# Patient Record
Sex: FEMALE | Race: WHITE | NOT HISPANIC OR LATINO | Employment: OTHER | URBAN - METROPOLITAN AREA
[De-identification: names, ages, dates, MRNs, and addresses within clinical notes are randomized per-mention and may not be internally consistent; named-entity substitution may affect disease eponyms.]

---

## 2017-01-13 DIAGNOSIS — S91.319A: Primary | ICD-10-CM

## 2017-01-13 DIAGNOSIS — S99.922A FOOT INJURY, LEFT, INITIAL ENCOUNTER: Primary | ICD-10-CM

## 2017-01-13 RX ORDER — OXYCODONE HYDROCHLORIDE 5 MG/1
5 TABLET ORAL EVERY 4 HOURS PRN
Qty: 25 TABLET | Refills: 0 | Status: SHIPPED | OUTPATIENT
Start: 2017-01-13 | End: 2018-02-22

## 2017-01-13 RX ORDER — OXYCODONE HYDROCHLORIDE 5 MG/1
5 TABLET ORAL EVERY 4 HOURS PRN
Qty: 25 TABLET | Refills: 0 | Status: SHIPPED | OUTPATIENT
Start: 2017-01-13 | End: 2017-01-13

## 2017-01-13 RX ORDER — OXYCODONE HYDROCHLORIDE 5 MG/1
5-10 TABLET ORAL EVERY 4 HOURS PRN
Qty: 18 TABLET | Refills: 0 | Status: SHIPPED | OUTPATIENT
Start: 2017-01-13 | End: 2018-02-22

## 2018-02-22 ENCOUNTER — OFFICE VISIT (OUTPATIENT)
Dept: FAMILY MEDICINE | Facility: CLINIC | Age: 58
End: 2018-02-22
Payer: COMMERCIAL

## 2018-02-22 VITALS
SYSTOLIC BLOOD PRESSURE: 129 MMHG | HEART RATE: 83 BPM | HEIGHT: 66 IN | DIASTOLIC BLOOD PRESSURE: 77 MMHG | WEIGHT: 116.6 LBS | OXYGEN SATURATION: 98 % | BODY MASS INDEX: 18.74 KG/M2

## 2018-02-22 DIAGNOSIS — Z01.818 PREOP GENERAL PHYSICAL EXAM: Primary | ICD-10-CM

## 2018-02-22 DIAGNOSIS — M17.11 PRIMARY OSTEOARTHRITIS OF RIGHT KNEE: ICD-10-CM

## 2018-02-22 DIAGNOSIS — Z91.030 ALLERGY TO BEE STING: ICD-10-CM

## 2018-02-22 LAB
HGB BLD-MCNC: 13.4 G/DL (ref 11.7–15.7)
MRSA DNA SPEC QL NAA+PROBE: NEGATIVE
PLATELET # BLD AUTO: 331 10E9/L (ref 150–450)
SPECIMEN SOURCE: NORMAL

## 2018-02-22 PROCEDURE — 99214 OFFICE O/P EST MOD 30 MIN: CPT | Performed by: INTERNAL MEDICINE

## 2018-02-22 PROCEDURE — 36415 COLL VENOUS BLD VENIPUNCTURE: CPT | Performed by: INTERNAL MEDICINE

## 2018-02-22 PROCEDURE — 87641 MR-STAPH DNA AMP PROBE: CPT | Performed by: INTERNAL MEDICINE

## 2018-02-22 PROCEDURE — 85018 HEMOGLOBIN: CPT | Performed by: INTERNAL MEDICINE

## 2018-02-22 PROCEDURE — 85049 AUTOMATED PLATELET COUNT: CPT | Performed by: INTERNAL MEDICINE

## 2018-02-22 PROCEDURE — 87640 STAPH A DNA AMP PROBE: CPT | Mod: 59 | Performed by: INTERNAL MEDICINE

## 2018-02-22 RX ORDER — EPINEPHRINE 0.3 MG/.3ML
0.3 INJECTION SUBCUTANEOUS
Status: CANCELLED | OUTPATIENT
Start: 2018-02-22

## 2018-02-22 RX ORDER — EPINEPHRINE 0.3 MG/.3ML
0.3 INJECTION SUBCUTANEOUS
Qty: 1.2 ML | Refills: 11 | Status: SHIPPED | OUTPATIENT
Start: 2018-02-22

## 2018-02-22 NOTE — MR AVS SNAPSHOT
After Visit Summary   2/22/2018    Kimi Ulloa    MRN: [id number]           Patient Information     Date Of Birth          1960        Visit Information        Provider Department      2/22/2018 2:30 PM Patricia Vargas MD [facility]        Today's Diagnoses     Preop general physical exam    -  1    Allergy to bee sting        Primary osteoarthritis of right knee          Care Instructions      Before Your Surgery      Call your surgeon if there is any change in your health. This includes signs of a cold or flu (such as a sore throat, runny nose, cough, rash or fever).    Do not smoke, drink alcohol or take over the counter medicine (unless your surgeon or primary care doctor tells you to) for the 24 hours before and after surgery.    If you take prescribed drugs: Follow your doctor s orders about which medicines to take and which to stop until after surgery.    Eating and drinking prior to surgery: follow the instructions from your surgeon    Take a shower or bath the night before surgery. Use the soap your surgeon gave you to gently clean your skin. If you do not have soap from your surgeon, use your regular soap. Do not shave or scrub the surgery site.  Wear clean pajamas and have clean sheets on your bed.           Follow-ups after your visit        Your next 10 appointments already scheduled     Feb 26, 2018   Procedure with Paresh Oliveira MD   [facility] (--)    [address]  [facility] [id number] [phone]              Who to contact     If you have questions or need follow up information about today's clinic visit or your schedule please contact [facility] directly at [phone].  Normal or non-critical lab and imaging results will be communicated to you by MyChart, letter or phone within 4 business days after the clinic has received the results. If you do not hear from us within 7 days, please contact the clinic  "through [employer] or phone. If you have a critical or abnormal lab result, we will notify you by phone as soon as possible.  Submit refill requests through [employer] or call your pharmacy and they will forward the refill request to us. Please allow 3 business days for your refill to be completed.          Additional Information About Your Visit        [employer] Information     [employer] lets you send messages to your doctor, view your test results, renew your prescriptions, schedule appointments and more. To sign up, go to [email or web address] . Click on \"Log in\" on the left side of the screen, which will take you to the Welcome page. Then click on \"Sign up Now\" on the right side of the page.     You will be asked to enter the access code listed below, as well as some personal information. Please follow the directions to create your username and password.     Your access code is: [email or web address]  Expires: 2018 10:02 AM     Your access code will  in 90 days. If you need help or a new code, please call your [place] clinic or [phone].        Care EveryWhere ID     This is your Care EveryWhere ID. This could be used by other organizations to access your [place] medical records  [id number]        Your Vitals Were     Pulse Height Last Period Pulse Oximetry Breastfeeding? BMI (Body Mass Index)    83 5' 6\" (1.676 m) (Approximate) 98% No 18.82 kg/m2       Blood Pressure from Last 3 Encounters:   18 129/77   01/14/15 126/85   14 122/80    Weight from Last 3 Encounters:   18 116 lb 9.6 oz (52.9 kg)   01/12/15 118 lb (53.5 kg)   14 119 lb 0.8 oz (54 kg)              We Performed the Following     Hemoglobin     Methicillin Resist/Sens S. aureus PCR     Platelet count          Where to get your medicines      These medications were sent to [id number] - [facility] [address]  [facility]  [address] [facility] [address]     Phone:  [phone]     EPINEPHrine 0.3 " MG/0.3ML injection 2-pack          Primary Care Provider Office Phone # Fax #    Patricia Camilo Vargas [phone] [address] [address]        Equal Access to Services     ADRIELDAISY NAHUN : Hadii aad ku hadjoseo Soedwinali, waaxda luqadaha, qaybta kaalmada adeegyada, waxseymour cesarin hayeliun garrett riveralisbethlauren lara . So [facility] [phone].    ATENCIÓN: Si habla español, tiene a obrien disposición servicios gratuitos de asistencia lingüística. Llame al [phone].    We comply with applicable federal civil rights laws and [place] laws. We do not discriminate on the basis of race, color, national origin, age, disability, sex, sexual orientation, or gender identity.            Thank you!     Thank you for choosing [facility]  for your care. Our goal is always to provide you with excellent care. Hearing back from our patients is one way we can continue to improve our services. Please take a few minutes to complete the written survey that you may receive in the mail after your visit with us. Thank you!             Your Updated Medication List - Protect others around you: Learn how to safely use, store and throw away your medicines at www.disposemymeds.org.          This list is accurate as of 2/22/18 11:59 PM.  Always use your most recent med list.                   Brand Name Dispense Instructions for use Diagnosis    acetaminophen 325 MG tablet    TYLENOL    100 tablet    Take 2 tablets (650 mg) by mouth every 6 hours as needed for mild pain or fever    Septic joint of right knee joint (H)       amoxicillin-clavulanate 875-125 MG per tablet    AUGMENTIN    28 tablet    Take 1 tablet by mouth 2 times daily    Septic joint of right knee joint (H)       EPINEPHrine 0.3 MG/0.3ML injection 2-pack    EPIPEN/ADRENACLICK/or ANY BX GENERIC EQUIV    1.2 mL    Inject 0.3 mLs (0.3 mg) into the muscle once as needed for anaphylaxis    Allergy to bee sting       IBUPROFEN PO      Take 600-800 mg  by mouth every 8 hours as needed for moderate pain        multivitamin, therapeutic Tabs tablet      Take 1 tablet by mouth daily

## 2018-02-22 NOTE — LETTER
[place] [address] [facility]  [address]  [place]  [address]  Tel: [phone]    February 23, 2018    Kimi Ulloa  [address] [place] [id number]        Dear Ms. Ulloa,    The results of your recent labs are ok.    If you have any further questions or problems, please contact our office.      Sincerely,    Camilo Vargas MD/ifrah    Results for orders placed or performed in visit on 02/22/18   Hemoglobin   Result Value Ref Range    Hemoglobin 13.4 11.7 - 15.7 g/dL   Platelet count   Result Value Ref Range    Platelet Count 331 150 - 450 10e9/L   Methicillin Resist/Sens S. aureus PCR   Result Value Ref Range    Specimen Description Nares     Methicillin Resist/Sens S. aureus PCR Negative NEG^Negative           Enclosure: Lab Results

## 2018-02-22 NOTE — PROGRESS NOTES
"[facility]  [address] [facility] [address]  [phone]  Dept: [phone]    PRE-OP EVALUATION:  Today's date: 2018    Kimi Ulloa (: 1960) presents for pre-operative evaluation assessment as requested by Dr. Oliveira.  She requires evaluation and anesthesia risk assessment prior to undergoing Right knee replacement surgery/procedure for treatment of chronic right knee DJD.    Proposed Surgery/ Procedure: Right knee replacement surgery  Date of Surgery/ Procedure: 2018  Time of Surgery/ Procedure: 9:00AM   Hospital/Surgical Facility: [facility]   Fax number for surgical facility:   Primary Physician: Patricia Vargas  Type of Anesthesia Anticipated: to be determined    Patient has a Health Care Directive or Living Will:  NO    1. NO - Do you have a history of heart attack, stroke, stent, bypass or surgery on an artery in the head, neck, heart or legs?  2. NO - Do you ever have any pain or discomfort in your chest?  3. NO - Do you have a history of  Heart Failure?  4. NO - Are you troubled by shortness of breath when: walking on the level, up a slight hill or at night?  5. NO - Do you currently have a cold, bronchitis or other respiratory infection?  6. NO - Do you have a cough, shortness of breath or wheezing?  7. NO - Do you sometimes get pains in the calves of your legs when you walk?  10. NO - Have you ever had problems with anemia or been told to take iron pills?  12. NO - Have you ever had a blood transfusion?  13. NO - Have you or any of your relatives ever had problems with anesthesia?  14. NO - Do you have sleep apnea, excessive snoring or daytime drowsiness?  15. NO - Do you have any prosthetic heart valves?  16. YES - DO YOU HAVE PROSTHETIC JOINTS? S/p Left Knee replacement  17. NO - Is there any chance that you may be pregnant?  8. NO - Do you or anyone in your family have previous history of blood clots?\",9. NO - Do you or does anyone in your " family have a serious bleeding problem such as prolonged bleeding following surgeries or cuts?  11 NO - Have you had any abnormal blood loss such as black, tarry or bloody stools, or abnormal vaginal bleeding?      HPI:     HPI related to upcoming procedure: Patient Kimi Ulloa is a very pleasant 57 year old female with a history of chronic right knee DJD, allergies to bee stings who presents to internal medicine clinic today for a pre op cardiac evaluation for upcoming Right knee replacement surgery for treatment of chronic right knee DJD.  Patient denies any chest pain, headaches, fever or chills. Patient denies any known history of CAD, CVA or Type 2 Diabetes. Patient is not on any daily aspirin or any other blood thinner medications. Patient denies any known history of allergies to anesthesia agents.      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 Provider to review and confirm.)       Aftercare following joint replacement 05/10/2010     Priority: Medium     Edema 05/10/2010     Priority: Medium      No past medical history on file.  Past Surgical History:   Procedure Laterality Date     ARTHROPLASTY KNEE Left 2010     ARTHROPLASTY REVISION KNEE  7/19/2014    Procedure: ARTHROPLASTY REVISION KNEE;  Surgeon: Paresh Oliveira MD;  Location:  OR     IRRIGATION AND DEBRIDEMENT KNEE, PLACE ANTIBIOTIC CEMENT BEADS / SPACE  7/19/2014    Procedure: COMBINED IRRIGATION AND DEBRIDEMENT KNEE, PLACE ANTIBIOTIC CEMENT BEADS / SPACER;  Surgeon: Paresh Oliveira MD;  Location:  OR     Current Outpatient  "Prescriptions   Medication Sig Dispense Refill     oxyCODONE (ROXICODONE) 5 MG IR tablet Take 1 tablet (5 mg) by mouth every 4 hours as needed for pain maximum 8 tablet(s) per day 25 tablet 0     oxyCODONE (ROXICODONE) 5 MG IR tablet Take 1-2 tablets (5-10 mg) by mouth every 4 hours as needed for pain Maximum 8 tablet(s) per day 18 tablet 0     HYDROcodone-acetaminophen (NORCO) 5-325 MG per tablet Take 1-2 tablets by mouth every 6 hours as needed for moderate to severe pain 50 tablet 0     acetaminophen (TYLENOL) 325 MG tablet Take 2 tablets (650 mg) by mouth every 6 hours as needed for mild pain or fever 100 tablet      amoxicillin-clavulanate (AUGMENTIN) 875-125 MG per tablet Take 1 tablet by mouth 2 times daily 28 tablet 0     multivitamin, therapeutic (THERA-VIT) TABS Take 1 tablet by mouth daily       IBUPROFEN PO Take 600-800 mg by mouth every 8 hours as needed for moderate pain        EPINEPHrine (EPIPEN) 0.3 MG/0.3ML injection Inject 0.3 mLs (0.3 mg) into the muscle once as needed for anaphylaxis 4 each 0     OTC products: None, except as noted above    Allergies   Allergen Reactions     Bees Anaphylaxis     Morphine Nausea and Vomiting     Vancomycin Rash      Latex Allergy: NO    Social History   Substance Use Topics     Smoking status: Never Smoker     Smokeless tobacco: Not on file     Alcohol use Yes     History   Drug Use No       REVIEW OF SYSTEMS:   Constitutional, neuro, ENT, endocrine, pulmonary, cardiac, gastrointestinal, genitourinary, musculoskeletal, integument and psychiatric systems are negative, except as otherwise noted.    EXAM:   /77 (BP Location: Right arm, Patient Position: Chair, Cuff Size: Adult Regular)  Pulse 83  Ht 5' 6\" (1.676 m)  Wt 116 lb 9.6 oz (52.9 kg)  LMP  (Approximate)  SpO2 98%  Breastfeeding? No  BMI 18.82 kg/m2    GENERAL APPEARANCE: healthy, alert and no distress     EYES: EOMI, PERRL     HENT: ear canals and TM's normal and nose and mouth without ulcers " or lesions     NECK: no adenopathy, no asymmetry, masses, or scars and thyroid normal to palpation     RESP: lungs clear to auscultation - no rales, rhonchi or wheezes     CV: regular rates and rhythm, normal S1 S2, no S3 or S4 and no murmur, click or rub     ABDOMEN:  soft, nontender, no HSM or masses and bowel sounds normal     MS: extremities normal- no gross deformities noted, no evidence of inflammation in joints, FROM in all extremities.     SKIN: no suspicious lesions or rashes     NEURO: Normal strength and tone, sensory exam grossly normal, mentation intact and speech normal     PSYCH: mentation appears normal. and affect normal/bright     LYMPHATICS: No cervical adenopathy    DIAGNOSTICS:       Recent Labs   Lab Test  01/12/15   1109  09/02/14   1500   08/11/14   1400  08/07/14   1030   07/19/14   0611   04/15/10   0629   HGB  13.9  12.6   < >  11.7   --    < >  12.5   < >  10.0*   PLT  288  356   < >  349   --    < >  265   < >   --    INR   --    --    --   1.43*  1.85*   < >   --    < >  1.15*   NA   --    --    --    --    --    --   141   --   135   POTASSIUM   --    --    --    --    --    --   3.6   --   4.2   CR  0.65  0.58   < >  0.53   --    < >  0.57   --    --     < > = values in this interval not displayed.      Results for orders placed or performed in visit on 02/22/18   Hemoglobin   Result Value Ref Range    Hemoglobin 13.4 11.7 - 15.7 g/dL   Platelet count   Result Value Ref Range    Platelet Count 331 150 - 450 10e9/L   Methicillin Resist/Sens S. aureus PCR   Result Value Ref Range    Specimen Description Nares     Methicillin Resist/Sens S. aureus PCR Negative NEG^Negative       IMPRESSION:   Reason for surgery/procedure: chronic right knee DJD  Diagnosis/reason for consult: pre op cardiac evaluation for Right knee replacement surgery for treatment of chronic right knee DJD.    The proposed surgical procedure is considered INTERMEDIATE risk.    REVISED CARDIAC RISK INDEX  The patient has  the following serious cardiovascular risks for perioperative complications such as (MI, PE, VFib and 3  AV Block):  No serious cardiac risks  INTERPRETATION: 0 risks: Class I (very low risk - 0.4% complication rate)    The patient has the following additional risks for perioperative complications:  No identified additional risks    RECOMMENDATIONS:       --Patient is to take all scheduled medications on the day of surgery.    APPROVAL GIVEN to proceed with proposed procedure, without further diagnostic evaluation       Signed Electronically by: Patricia Vargas MD    Copy of this evaluation report is provided to requesting physician.    [facility] Preop Guidelines

## 2018-02-22 NOTE — NURSING NOTE
"Chief Complaint   Patient presents with     Pre-Op Exam       Initial /77 (BP Location: Right arm, Patient Position: Chair, Cuff Size: Adult Regular)  Pulse 83  Ht 5' 6\" (1.676 m)  Wt 116 lb 9.6 oz (52.9 kg)  LMP  (Approximate)  SpO2 98%  Breastfeeding? No  BMI 18.82 kg/m2 Estimated body mass index is 18.82 kg/(m^2) as calculated from the following:    Height as of this encounter: 5' 6\" (1.676 m).    Weight as of this encounter: 116 lb 9.6 oz (52.9 kg).  Medication Reconciliation: complete   Zulma Desir MA     "

## 2018-02-23 NOTE — H&P (VIEW-ONLY)
"[facility]  [address] [facility] [address]  [phone]  Dept: [phone]    PRE-OP EVALUATION:  Today's date: 2018    Kimi Ulloa (: 1960) presents for pre-operative evaluation assessment as requested by Dr. Oliveira.  She requires evaluation and anesthesia risk assessment prior to undergoing Right knee replacement surgery/procedure for treatment of chronic right knee DJD.    Proposed Surgery/ Procedure: Right knee replacement surgery  Date of Surgery/ Procedure: 2018  Time of Surgery/ Procedure: 9:00AM   Hospital/Surgical Facility: [facility]   Fax number for surgical facility:   Primary Physician: Patricia Vargas  Type of Anesthesia Anticipated: to be determined    Patient has a Health Care Directive or Living Will:  NO    1. NO - Do you have a history of heart attack, stroke, stent, bypass or surgery on an artery in the head, neck, heart or legs?  2. NO - Do you ever have any pain or discomfort in your chest?  3. NO - Do you have a history of  Heart Failure?  4. NO - Are you troubled by shortness of breath when: walking on the level, up a slight hill or at night?  5. NO - Do you currently have a cold, bronchitis or other respiratory infection?  6. NO - Do you have a cough, shortness of breath or wheezing?  7. NO - Do you sometimes get pains in the calves of your legs when you walk?  10. NO - Have you ever had problems with anemia or been told to take iron pills?  12. NO - Have you ever had a blood transfusion?  13. NO - Have you or any of your relatives ever had problems with anesthesia?  14. NO - Do you have sleep apnea, excessive snoring or daytime drowsiness?  15. NO - Do you have any prosthetic heart valves?  16. YES - DO YOU HAVE PROSTHETIC JOINTS? S/p Left Knee replacement  17. NO - Is there any chance that you may be pregnant?  8. NO - Do you or anyone in your family have previous history of blood clots?\",9. NO - Do you or does anyone in your " family have a serious bleeding problem such as prolonged bleeding following surgeries or cuts?  11 NO - Have you had any abnormal blood loss such as black, tarry or bloody stools, or abnormal vaginal bleeding?      HPI:     HPI related to upcoming procedure: Patient Kimi Ulloa is a very pleasant 57 year old female with a history of chronic right knee DJD, allergies to bee stings who presents to internal medicine clinic today for a pre op cardiac evaluation for upcoming Right knee replacement surgery for treatment of chronic right knee DJD.  Patient denies any chest pain, headaches, fever or chills. Patient denies any known history of CAD, CVA or Type 2 Diabetes. Patient is not on any daily aspirin or any other blood thinner medications. Patient denies any known history of allergies to anesthesia agents.      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 Provider to review and confirm.)       Aftercare following joint replacement 05/10/2010     Priority: Medium     Edema 05/10/2010     Priority: Medium      No past medical history on file.  Past Surgical History:   Procedure Laterality Date     ARTHROPLASTY KNEE Left 2010     ARTHROPLASTY REVISION KNEE  7/19/2014    Procedure: ARTHROPLASTY REVISION KNEE;  Surgeon: Paresh Oliveira MD;  Location:  OR     IRRIGATION AND DEBRIDEMENT KNEE, PLACE ANTIBIOTIC CEMENT BEADS / SPACE  7/19/2014    Procedure: COMBINED IRRIGATION AND DEBRIDEMENT KNEE, PLACE ANTIBIOTIC CEMENT BEADS / SPACER;  Surgeon: Paresh Oliveira MD;  Location:  OR     Current Outpatient  "Prescriptions   Medication Sig Dispense Refill     oxyCODONE (ROXICODONE) 5 MG IR tablet Take 1 tablet (5 mg) by mouth every 4 hours as needed for pain maximum 8 tablet(s) per day 25 tablet 0     oxyCODONE (ROXICODONE) 5 MG IR tablet Take 1-2 tablets (5-10 mg) by mouth every 4 hours as needed for pain Maximum 8 tablet(s) per day 18 tablet 0     HYDROcodone-acetaminophen (NORCO) 5-325 MG per tablet Take 1-2 tablets by mouth every 6 hours as needed for moderate to severe pain 50 tablet 0     acetaminophen (TYLENOL) 325 MG tablet Take 2 tablets (650 mg) by mouth every 6 hours as needed for mild pain or fever 100 tablet      amoxicillin-clavulanate (AUGMENTIN) 875-125 MG per tablet Take 1 tablet by mouth 2 times daily 28 tablet 0     multivitamin, therapeutic (THERA-VIT) TABS Take 1 tablet by mouth daily       IBUPROFEN PO Take 600-800 mg by mouth every 8 hours as needed for moderate pain        EPINEPHrine (EPIPEN) 0.3 MG/0.3ML injection Inject 0.3 mLs (0.3 mg) into the muscle once as needed for anaphylaxis 4 each 0     OTC products: None, except as noted above    Allergies   Allergen Reactions     Bees Anaphylaxis     Morphine Nausea and Vomiting     Vancomycin Rash      Latex Allergy: NO    Social History   Substance Use Topics     Smoking status: Never Smoker     Smokeless tobacco: Not on file     Alcohol use Yes     History   Drug Use No       REVIEW OF SYSTEMS:   Constitutional, neuro, ENT, endocrine, pulmonary, cardiac, gastrointestinal, genitourinary, musculoskeletal, integument and psychiatric systems are negative, except as otherwise noted.    EXAM:   /77 (BP Location: Right arm, Patient Position: Chair, Cuff Size: Adult Regular)  Pulse 83  Ht 5' 6\" (1.676 m)  Wt 116 lb 9.6 oz (52.9 kg)  LMP  (Approximate)  SpO2 98%  Breastfeeding? No  BMI 18.82 kg/m2    GENERAL APPEARANCE: healthy, alert and no distress     EYES: EOMI, PERRL     HENT: ear canals and TM's normal and nose and mouth without ulcers " or lesions     NECK: no adenopathy, no asymmetry, masses, or scars and thyroid normal to palpation     RESP: lungs clear to auscultation - no rales, rhonchi or wheezes     CV: regular rates and rhythm, normal S1 S2, no S3 or S4 and no murmur, click or rub     ABDOMEN:  soft, nontender, no HSM or masses and bowel sounds normal     MS: extremities normal- no gross deformities noted, no evidence of inflammation in joints, FROM in all extremities.     SKIN: no suspicious lesions or rashes     NEURO: Normal strength and tone, sensory exam grossly normal, mentation intact and speech normal     PSYCH: mentation appears normal. and affect normal/bright     LYMPHATICS: No cervical adenopathy    DIAGNOSTICS:       Recent Labs   Lab Test  01/12/15   1109  09/02/14   1500   08/11/14   1400  08/07/14   1030   07/19/14   0611   04/15/10   0629   HGB  13.9  12.6   < >  11.7   --    < >  12.5   < >  10.0*   PLT  288  356   < >  349   --    < >  265   < >   --    INR   --    --    --   1.43*  1.85*   < >   --    < >  1.15*   NA   --    --    --    --    --    --   141   --   135   POTASSIUM   --    --    --    --    --    --   3.6   --   4.2   CR  0.65  0.58   < >  0.53   --    < >  0.57   --    --     < > = values in this interval not displayed.      Results for orders placed or performed in visit on 02/22/18   Hemoglobin   Result Value Ref Range    Hemoglobin 13.4 11.7 - 15.7 g/dL   Platelet count   Result Value Ref Range    Platelet Count 331 150 - 450 10e9/L   Methicillin Resist/Sens S. aureus PCR   Result Value Ref Range    Specimen Description Nares     Methicillin Resist/Sens S. aureus PCR Negative NEG^Negative       IMPRESSION:   Reason for surgery/procedure: chronic right knee DJD  Diagnosis/reason for consult: pre op cardiac evaluation for Right knee replacement surgery for treatment of chronic right knee DJD.    The proposed surgical procedure is considered INTERMEDIATE risk.    REVISED CARDIAC RISK INDEX  The patient has  the following serious cardiovascular risks for perioperative complications such as (MI, PE, VFib and 3  AV Block):  No serious cardiac risks  INTERPRETATION: 0 risks: Class I (very low risk - 0.4% complication rate)    The patient has the following additional risks for perioperative complications:  No identified additional risks    RECOMMENDATIONS:       --Patient is to take all scheduled medications on the day of surgery.    APPROVAL GIVEN to proceed with proposed procedure, without further diagnostic evaluation       Signed Electronically by: Patricia Vargas MD    Copy of this evaluation report is provided to requesting physician.    [place] Preop Guidelines

## 2018-02-24 ENCOUNTER — HEALTH MAINTENANCE LETTER (OUTPATIENT)
Age: 58
End: 2018-02-24

## 2018-02-26 ENCOUNTER — HOSPITAL ENCOUNTER (INPATIENT)
Facility: CLINIC | Age: 58
LOS: 3 days | Discharge: HOME HEALTH | DRG: 470 | End: 2018-03-01
Attending: ORTHOPAEDIC SURGERY | Admitting: ORTHOPAEDIC SURGERY
Payer: COMMERCIAL

## 2018-02-26 ENCOUNTER — ANESTHESIA (OUTPATIENT)
Dept: SURGERY | Facility: CLINIC | Age: 58
DRG: 470 | End: 2018-02-26
Payer: COMMERCIAL

## 2018-02-26 ENCOUNTER — APPOINTMENT (OUTPATIENT)
Dept: GENERAL RADIOLOGY | Facility: CLINIC | Age: 58
DRG: 470 | End: 2018-02-26
Attending: ORTHOPAEDIC SURGERY
Payer: COMMERCIAL

## 2018-02-26 ENCOUNTER — ANESTHESIA EVENT (OUTPATIENT)
Dept: SURGERY | Facility: CLINIC | Age: 58
DRG: 470 | End: 2018-02-26
Payer: COMMERCIAL

## 2018-02-26 ENCOUNTER — APPOINTMENT (OUTPATIENT)
Dept: PHYSICAL THERAPY | Facility: CLINIC | Age: 58
DRG: 470 | End: 2018-02-26
Attending: ORTHOPAEDIC SURGERY
Payer: COMMERCIAL

## 2018-02-26 DIAGNOSIS — Z96.651 TOTAL KNEE REPLACEMENT STATUS, RIGHT: Primary | ICD-10-CM

## 2018-02-26 DIAGNOSIS — Z96.652 STATUS POST TOTAL LEFT KNEE REPLACEMENT: ICD-10-CM

## 2018-02-26 LAB
CREAT SERPL-MCNC: 0.58 MG/DL (ref 0.52–1.04)
GFR SERPL CREATININE-BSD FRML MDRD: >90 ML/MIN/1.7M2
PLATELET # BLD AUTO: 247 10E9/L (ref 150–450)

## 2018-02-26 PROCEDURE — 97110 THERAPEUTIC EXERCISES: CPT | Mod: GP | Performed by: PHYSICAL THERAPIST

## 2018-02-26 PROCEDURE — 97530 THERAPEUTIC ACTIVITIES: CPT | Mod: GP | Performed by: PHYSICAL THERAPIST

## 2018-02-26 PROCEDURE — 97161 PT EVAL LOW COMPLEX 20 MIN: CPT | Mod: GP | Performed by: PHYSICAL THERAPIST

## 2018-02-26 PROCEDURE — 37000008 ZZH ANESTHESIA TECHNICAL FEE, 1ST 30 MIN: Performed by: ORTHOPAEDIC SURGERY

## 2018-02-26 PROCEDURE — 25000128 H RX IP 250 OP 636: Performed by: ORTHOPAEDIC SURGERY

## 2018-02-26 PROCEDURE — 36415 COLL VENOUS BLD VENIPUNCTURE: CPT | Performed by: ORTHOPAEDIC SURGERY

## 2018-02-26 PROCEDURE — 25000125 ZZHC RX 250: Performed by: NURSE ANESTHETIST, CERTIFIED REGISTERED

## 2018-02-26 PROCEDURE — 25000128 H RX IP 250 OP 636: Performed by: NURSE ANESTHETIST, CERTIFIED REGISTERED

## 2018-02-26 PROCEDURE — 40000170 ZZH STATISTIC PRE-PROCEDURE ASSESSMENT II: Performed by: ORTHOPAEDIC SURGERY

## 2018-02-26 PROCEDURE — 36000063 ZZH SURGERY LEVEL 4 EA 15 ADDTL MIN: Performed by: ORTHOPAEDIC SURGERY

## 2018-02-26 PROCEDURE — 37000009 ZZH ANESTHESIA TECHNICAL FEE, EACH ADDTL 15 MIN: Performed by: ORTHOPAEDIC SURGERY

## 2018-02-26 PROCEDURE — 71000012 ZZH RECOVERY PHASE 1 LEVEL 1 FIRST HR: Performed by: ORTHOPAEDIC SURGERY

## 2018-02-26 PROCEDURE — 27210794 ZZH OR GENERAL SUPPLY STERILE: Performed by: ORTHOPAEDIC SURGERY

## 2018-02-26 PROCEDURE — 25000128 H RX IP 250 OP 636: Performed by: ANESTHESIOLOGY

## 2018-02-26 PROCEDURE — 85049 AUTOMATED PLATELET COUNT: CPT | Performed by: ORTHOPAEDIC SURGERY

## 2018-02-26 PROCEDURE — 25800025 ZZH RX 258: Performed by: ORTHOPAEDIC SURGERY

## 2018-02-26 PROCEDURE — 36000093 ZZH SURGERY LEVEL 4 1ST 30 MIN: Performed by: ORTHOPAEDIC SURGERY

## 2018-02-26 PROCEDURE — C1776 JOINT DEVICE (IMPLANTABLE): HCPCS | Performed by: ORTHOPAEDIC SURGERY

## 2018-02-26 PROCEDURE — 82565 ASSAY OF CREATININE: CPT | Performed by: ORTHOPAEDIC SURGERY

## 2018-02-26 PROCEDURE — 27210995 ZZH RX 272: Performed by: ORTHOPAEDIC SURGERY

## 2018-02-26 PROCEDURE — 27110028 ZZH OR GENERAL SUPPLY NON-STERILE: Performed by: ORTHOPAEDIC SURGERY

## 2018-02-26 PROCEDURE — 0SRC0J9 REPLACEMENT OF RIGHT KNEE JOINT WITH SYNTHETIC SUBSTITUTE, CEMENTED, OPEN APPROACH: ICD-10-PCS | Performed by: ORTHOPAEDIC SURGERY

## 2018-02-26 PROCEDURE — 25000128 H RX IP 250 OP 636

## 2018-02-26 PROCEDURE — 27810169 ZZH OR IMPLANT GENERAL: Performed by: ORTHOPAEDIC SURGERY

## 2018-02-26 PROCEDURE — 25000125 ZZHC RX 250: Performed by: ORTHOPAEDIC SURGERY

## 2018-02-26 PROCEDURE — 40000986 XR KNEE PORT RT 1/2 VW: Mod: RT

## 2018-02-26 PROCEDURE — 12000007 ZZH R&B INTERMEDIATE

## 2018-02-26 PROCEDURE — 40000193 ZZH STATISTIC PT WARD VISIT: Performed by: PHYSICAL THERAPIST

## 2018-02-26 PROCEDURE — 97116 GAIT TRAINING THERAPY: CPT | Mod: GP | Performed by: PHYSICAL THERAPIST

## 2018-02-26 DEVICE — IMP TIB BASE JJ ATTUNE RP SZ5 CEM 150610005: Type: IMPLANTABLE DEVICE | Site: KNEE | Status: FUNCTIONAL

## 2018-02-26 DEVICE — IMPLANTABLE DEVICE: Type: IMPLANTABLE DEVICE | Site: KNEE | Status: FUNCTIONAL

## 2018-02-26 DEVICE — IMP COMP FEM JJ ATTUNE POST STAB RT NRW CEM SZ5 150410225: Type: IMPLANTABLE DEVICE | Site: KNEE | Status: FUNCTIONAL

## 2018-02-26 DEVICE — IMP BONE CEMENT SIMPLEX W/GENTAMICIN 6195-1-001: Type: IMPLANTABLE DEVICE | Site: KNEE | Status: FUNCTIONAL

## 2018-02-26 RX ORDER — HYDROCODONE BITARTRATE AND ACETAMINOPHEN 5; 325 MG/1; MG/1
1-2 TABLET ORAL EVERY 4 HOURS PRN
Status: DISCONTINUED | OUTPATIENT
Start: 2018-02-26 | End: 2018-02-26

## 2018-02-26 RX ORDER — NALOXONE HYDROCHLORIDE 0.4 MG/ML
.1-.4 INJECTION, SOLUTION INTRAMUSCULAR; INTRAVENOUS; SUBCUTANEOUS
Status: DISCONTINUED | OUTPATIENT
Start: 2018-02-26 | End: 2018-03-01 | Stop reason: HOSPADM

## 2018-02-26 RX ORDER — AMOXICILLIN 250 MG
1 CAPSULE ORAL 2 TIMES DAILY PRN
Status: DISCONTINUED | OUTPATIENT
Start: 2018-02-26 | End: 2018-02-26

## 2018-02-26 RX ORDER — SODIUM CHLORIDE 9 MG/ML
INJECTION, SOLUTION INTRAVENOUS CONTINUOUS
Status: DISCONTINUED | OUTPATIENT
Start: 2018-02-26 | End: 2018-02-27

## 2018-02-26 RX ORDER — OXYCODONE HYDROCHLORIDE 5 MG/1
5-10 TABLET ORAL
Status: DISCONTINUED | OUTPATIENT
Start: 2018-02-26 | End: 2018-03-01

## 2018-02-26 RX ORDER — ONDANSETRON 4 MG/1
4 TABLET, ORALLY DISINTEGRATING ORAL EVERY 30 MIN PRN
Status: DISCONTINUED | OUTPATIENT
Start: 2018-02-26 | End: 2018-02-26 | Stop reason: HOSPADM

## 2018-02-26 RX ORDER — BUPIVACAINE HYDROCHLORIDE 2.5 MG/ML
INJECTION, SOLUTION EPIDURAL; INFILTRATION; INTRACAUDAL PRN
Status: DISCONTINUED | OUTPATIENT
Start: 2018-02-26 | End: 2018-02-26 | Stop reason: HOSPADM

## 2018-02-26 RX ORDER — HYDROMORPHONE HYDROCHLORIDE 1 MG/ML
.3-.5 INJECTION, SOLUTION INTRAMUSCULAR; INTRAVENOUS; SUBCUTANEOUS
Status: DISCONTINUED | OUTPATIENT
Start: 2018-02-26 | End: 2018-03-01 | Stop reason: HOSPADM

## 2018-02-26 RX ORDER — ONDANSETRON 2 MG/ML
4 INJECTION INTRAMUSCULAR; INTRAVENOUS EVERY 30 MIN PRN
Status: DISCONTINUED | OUTPATIENT
Start: 2018-02-26 | End: 2018-02-26 | Stop reason: HOSPADM

## 2018-02-26 RX ORDER — PROPOFOL 10 MG/ML
INJECTION, EMULSION INTRAVENOUS CONTINUOUS PRN
Status: DISCONTINUED | OUTPATIENT
Start: 2018-02-26 | End: 2018-02-26

## 2018-02-26 RX ORDER — KETOROLAC TROMETHAMINE 15 MG/ML
15 INJECTION, SOLUTION INTRAMUSCULAR; INTRAVENOUS EVERY 6 HOURS PRN
Status: DISCONTINUED | OUTPATIENT
Start: 2018-02-26 | End: 2018-02-27

## 2018-02-26 RX ORDER — BISACODYL 10 MG
10 SUPPOSITORY, RECTAL RECTAL DAILY PRN
Status: DISCONTINUED | OUTPATIENT
Start: 2018-02-26 | End: 2018-02-26

## 2018-02-26 RX ORDER — CEFAZOLIN SODIUM 2 G/100ML
2 INJECTION, SOLUTION INTRAVENOUS
Status: COMPLETED | OUTPATIENT
Start: 2018-02-26 | End: 2018-02-26

## 2018-02-26 RX ORDER — LIDOCAINE 40 MG/G
CREAM TOPICAL
Status: DISCONTINUED | OUTPATIENT
Start: 2018-02-26 | End: 2018-03-01 | Stop reason: HOSPADM

## 2018-02-26 RX ORDER — FENTANYL CITRATE 50 UG/ML
INJECTION, SOLUTION INTRAMUSCULAR; INTRAVENOUS PRN
Status: DISCONTINUED | OUTPATIENT
Start: 2018-02-26 | End: 2018-02-26

## 2018-02-26 RX ORDER — FENTANYL CITRATE 0.05 MG/ML
25-50 INJECTION, SOLUTION INTRAMUSCULAR; INTRAVENOUS EVERY 5 MIN PRN
Status: DISCONTINUED | OUTPATIENT
Start: 2018-02-26 | End: 2018-02-26 | Stop reason: HOSPADM

## 2018-02-26 RX ORDER — CYCLOBENZAPRINE HCL 10 MG
10 TABLET ORAL 3 TIMES DAILY PRN
Status: DISCONTINUED | OUTPATIENT
Start: 2018-02-26 | End: 2018-03-01 | Stop reason: HOSPADM

## 2018-02-26 RX ORDER — ACETAMINOPHEN 325 MG/1
650 TABLET ORAL EVERY 4 HOURS PRN
Status: DISCONTINUED | OUTPATIENT
Start: 2018-03-01 | End: 2018-03-01 | Stop reason: HOSPADM

## 2018-02-26 RX ORDER — NALOXONE HYDROCHLORIDE 0.4 MG/ML
.1-.4 INJECTION, SOLUTION INTRAMUSCULAR; INTRAVENOUS; SUBCUTANEOUS
Status: ACTIVE | OUTPATIENT
Start: 2018-02-26 | End: 2018-02-27

## 2018-02-26 RX ORDER — AMOXICILLIN 250 MG
2 CAPSULE ORAL 2 TIMES DAILY PRN
Status: DISCONTINUED | OUTPATIENT
Start: 2018-02-26 | End: 2018-02-26

## 2018-02-26 RX ORDER — ONDANSETRON 2 MG/ML
INJECTION INTRAMUSCULAR; INTRAVENOUS PRN
Status: DISCONTINUED | OUTPATIENT
Start: 2018-02-26 | End: 2018-02-26

## 2018-02-26 RX ORDER — HYDROMORPHONE HYDROCHLORIDE 1 MG/ML
INJECTION, SOLUTION INTRAMUSCULAR; INTRAVENOUS; SUBCUTANEOUS
Status: COMPLETED
Start: 2018-02-26 | End: 2018-02-26

## 2018-02-26 RX ORDER — AMOXICILLIN 250 MG
1 CAPSULE ORAL 2 TIMES DAILY PRN
Status: DISCONTINUED | OUTPATIENT
Start: 2018-02-26 | End: 2018-03-01 | Stop reason: HOSPADM

## 2018-02-26 RX ORDER — BUPIVACAINE HYDROCHLORIDE AND EPINEPHRINE 2.5; 5 MG/ML; UG/ML
INJECTION, SOLUTION EPIDURAL; INFILTRATION; INTRACAUDAL; PERINEURAL PRN
Status: DISCONTINUED | OUTPATIENT
Start: 2018-02-26 | End: 2018-02-26 | Stop reason: HOSPADM

## 2018-02-26 RX ORDER — SODIUM CHLORIDE, SODIUM LACTATE, POTASSIUM CHLORIDE, CALCIUM CHLORIDE 600; 310; 30; 20 MG/100ML; MG/100ML; MG/100ML; MG/100ML
INJECTION, SOLUTION INTRAVENOUS CONTINUOUS
Status: DISCONTINUED | OUTPATIENT
Start: 2018-02-26 | End: 2018-02-26 | Stop reason: HOSPADM

## 2018-02-26 RX ORDER — MAGNESIUM HYDROXIDE 1200 MG/15ML
LIQUID ORAL PRN
Status: DISCONTINUED | OUTPATIENT
Start: 2018-02-26 | End: 2018-02-26 | Stop reason: HOSPADM

## 2018-02-26 RX ORDER — CEFAZOLIN SODIUM 1 G
1 VIAL (EA) INJECTION EVERY 8 HOURS
Status: COMPLETED | OUTPATIENT
Start: 2018-02-26 | End: 2018-02-27

## 2018-02-26 RX ORDER — BUPIVACAINE HYDROCHLORIDE 7.5 MG/ML
INJECTION, SOLUTION INTRASPINAL PRN
Status: DISCONTINUED | OUTPATIENT
Start: 2018-02-26 | End: 2018-02-26

## 2018-02-26 RX ORDER — ONDANSETRON 4 MG/1
4 TABLET, ORALLY DISINTEGRATING ORAL EVERY 6 HOURS PRN
Status: DISCONTINUED | OUTPATIENT
Start: 2018-02-26 | End: 2018-03-01 | Stop reason: HOSPADM

## 2018-02-26 RX ORDER — BISACODYL 10 MG
10 SUPPOSITORY, RECTAL RECTAL DAILY PRN
Status: DISCONTINUED | OUTPATIENT
Start: 2018-02-26 | End: 2018-03-01 | Stop reason: HOSPADM

## 2018-02-26 RX ORDER — SODIUM CHLORIDE, SODIUM LACTATE, POTASSIUM CHLORIDE, CALCIUM CHLORIDE 600; 310; 30; 20 MG/100ML; MG/100ML; MG/100ML; MG/100ML
INJECTION, SOLUTION INTRAVENOUS CONTINUOUS
Status: DISCONTINUED | OUTPATIENT
Start: 2018-02-26 | End: 2018-02-26

## 2018-02-26 RX ORDER — LABETALOL HYDROCHLORIDE 5 MG/ML
10 INJECTION, SOLUTION INTRAVENOUS
Status: DISCONTINUED | OUTPATIENT
Start: 2018-02-26 | End: 2018-02-26 | Stop reason: HOSPADM

## 2018-02-26 RX ORDER — CEFAZOLIN SODIUM 1 G
1 VIAL (EA) INJECTION SEE ADMIN INSTRUCTIONS
Status: DISCONTINUED | OUTPATIENT
Start: 2018-02-26 | End: 2018-02-26

## 2018-02-26 RX ORDER — AMOXICILLIN 250 MG
2 CAPSULE ORAL 2 TIMES DAILY PRN
Status: DISCONTINUED | OUTPATIENT
Start: 2018-02-26 | End: 2018-03-01 | Stop reason: HOSPADM

## 2018-02-26 RX ORDER — ONDANSETRON 2 MG/ML
4 INJECTION INTRAMUSCULAR; INTRAVENOUS EVERY 6 HOURS PRN
Status: DISCONTINUED | OUTPATIENT
Start: 2018-02-26 | End: 2018-03-01 | Stop reason: HOSPADM

## 2018-02-26 RX ORDER — EPHEDRINE SULFATE 50 MG/ML
INJECTION, SOLUTION INTRAMUSCULAR; INTRAVENOUS; SUBCUTANEOUS PRN
Status: DISCONTINUED | OUTPATIENT
Start: 2018-02-26 | End: 2018-02-26

## 2018-02-26 RX ORDER — HYDROMORPHONE HYDROCHLORIDE 1 MG/ML
.3-.5 INJECTION, SOLUTION INTRAMUSCULAR; INTRAVENOUS; SUBCUTANEOUS EVERY 5 MIN PRN
Status: DISCONTINUED | OUTPATIENT
Start: 2018-02-26 | End: 2018-02-26 | Stop reason: HOSPADM

## 2018-02-26 RX ADMIN — CEFAZOLIN SODIUM 1 G: 10 INJECTION, POWDER, FOR SOLUTION INTRAVENOUS at 17:06

## 2018-02-26 RX ADMIN — PHENYLEPHRINE HYDROCHLORIDE 100 MCG: 10 INJECTION INTRAVENOUS at 09:58

## 2018-02-26 RX ADMIN — PHENYLEPHRINE HYDROCHLORIDE 50 MCG: 10 INJECTION INTRAVENOUS at 09:40

## 2018-02-26 RX ADMIN — PHENYLEPHRINE HYDROCHLORIDE 50 MCG: 10 INJECTION INTRAVENOUS at 09:29

## 2018-02-26 RX ADMIN — PHENYLEPHRINE HYDROCHLORIDE 100 MCG: 10 INJECTION INTRAVENOUS at 10:40

## 2018-02-26 RX ADMIN — KETOROLAC TROMETHAMINE 15 MG: 15 INJECTION, SOLUTION INTRAMUSCULAR; INTRAVENOUS at 13:36

## 2018-02-26 RX ADMIN — Medication 0.5 MG: at 13:12

## 2018-02-26 RX ADMIN — KETOROLAC TROMETHAMINE 15 MG: 15 INJECTION, SOLUTION INTRAMUSCULAR; INTRAVENOUS at 19:48

## 2018-02-26 RX ADMIN — Medication 5 MG: at 10:07

## 2018-02-26 RX ADMIN — FENTANYL CITRATE 25 MCG: 50 INJECTION, SOLUTION INTRAMUSCULAR; INTRAVENOUS at 09:25

## 2018-02-26 RX ADMIN — SODIUM CHLORIDE: 9 INJECTION, SOLUTION INTRAVENOUS at 13:14

## 2018-02-26 RX ADMIN — SODIUM CHLORIDE, POTASSIUM CHLORIDE, SODIUM LACTATE AND CALCIUM CHLORIDE: 600; 310; 30; 20 INJECTION, SOLUTION INTRAVENOUS at 07:59

## 2018-02-26 RX ADMIN — PHENYLEPHRINE HYDROCHLORIDE 100 MCG: 10 INJECTION INTRAVENOUS at 09:53

## 2018-02-26 RX ADMIN — ONDANSETRON 4 MG: 2 INJECTION INTRAMUSCULAR; INTRAVENOUS at 18:29

## 2018-02-26 RX ADMIN — SODIUM CHLORIDE, POTASSIUM CHLORIDE, SODIUM LACTATE AND CALCIUM CHLORIDE: 600; 310; 30; 20 INJECTION, SOLUTION INTRAVENOUS at 09:31

## 2018-02-26 RX ADMIN — PHENYLEPHRINE HYDROCHLORIDE 100 MCG: 10 INJECTION INTRAVENOUS at 10:04

## 2018-02-26 RX ADMIN — BUPIVACAINE HYDROCHLORIDE IN DEXTROSE 10.5 MG: 7.5 INJECTION, SOLUTION SUBARACHNOID at 09:04

## 2018-02-26 RX ADMIN — MIDAZOLAM HYDROCHLORIDE 2 MG: 1 INJECTION, SOLUTION INTRAMUSCULAR; INTRAVENOUS at 08:00

## 2018-02-26 RX ADMIN — PHENYLEPHRINE HYDROCHLORIDE 100 MCG: 10 INJECTION INTRAVENOUS at 10:07

## 2018-02-26 RX ADMIN — PHENYLEPHRINE HYDROCHLORIDE 100 MCG: 10 INJECTION INTRAVENOUS at 10:28

## 2018-02-26 RX ADMIN — ROPIVACAINE HYDROCHLORIDE 20 ML GIVEN: 5 INJECTION, SOLUTION EPIDURAL; INFILTRATION; PERINEURAL at 08:00

## 2018-02-26 RX ADMIN — PHENYLEPHRINE HYDROCHLORIDE 50 MCG: 10 INJECTION INTRAVENOUS at 09:45

## 2018-02-26 RX ADMIN — PHENYLEPHRINE HYDROCHLORIDE 100 MCG: 10 INJECTION INTRAVENOUS at 10:19

## 2018-02-26 RX ADMIN — Medication 0.5 MG: at 17:14

## 2018-02-26 RX ADMIN — Medication 0.5 MG: at 19:03

## 2018-02-26 RX ADMIN — Medication 5 MG: at 10:28

## 2018-02-26 RX ADMIN — Medication 10 MG: at 10:13

## 2018-02-26 RX ADMIN — FENTANYL CITRATE 25 MCG: 50 INJECTION, SOLUTION INTRAMUSCULAR; INTRAVENOUS at 10:36

## 2018-02-26 RX ADMIN — Medication 5 MG: at 10:46

## 2018-02-26 RX ADMIN — PHENYLEPHRINE HYDROCHLORIDE 50 MCG: 10 INJECTION INTRAVENOUS at 09:34

## 2018-02-26 RX ADMIN — PHENYLEPHRINE HYDROCHLORIDE 50 MCG: 10 INJECTION INTRAVENOUS at 09:49

## 2018-02-26 RX ADMIN — CEFAZOLIN SODIUM 2 G: 2 INJECTION, SOLUTION INTRAVENOUS at 09:06

## 2018-02-26 RX ADMIN — MIDAZOLAM 2 MG: 1 INJECTION INTRAMUSCULAR; INTRAVENOUS at 08:57

## 2018-02-26 RX ADMIN — SODIUM CHLORIDE: 9 INJECTION, SOLUTION INTRAVENOUS at 21:42

## 2018-02-26 RX ADMIN — PROPOFOL 100 MCG/KG/MIN: 10 INJECTION, EMULSION INTRAVENOUS at 09:05

## 2018-02-26 RX ADMIN — ONDANSETRON 4 MG: 2 INJECTION INTRAMUSCULAR; INTRAVENOUS at 10:16

## 2018-02-26 RX ADMIN — Medication 0.5 MG: at 15:12

## 2018-02-26 RX ADMIN — Medication 0.5 MG: at 21:40

## 2018-02-26 RX ADMIN — HYDROMORPHONE HYDROCHLORIDE 0.5 MG: 1 INJECTION, SOLUTION INTRAMUSCULAR; INTRAVENOUS; SUBCUTANEOUS at 13:12

## 2018-02-26 NOTE — BRIEF OP NOTE
[facility] Brief Operative Note    Pre-operative diagnosis: END STAGE OSTEOARTHRITIS RIGHT KNEE   Post-operative diagnosis  same   Procedure: Procedure(s):  RIGHT TOTAL KNEE ARTHROPLASTY   - Wound Class: I-Clean   Surgeon(s): Surgeon(s) and Role:     * Paresh Oliveira MD - Primary    asst   Pascual Mendoza   Estimated blood loss: 20   Specimens: none   Findings: Chronic  Acl,   Stage   IV   Post  Med  And  Lat  Joints  And  1  Cm  Depression  of

## 2018-02-26 NOTE — IP AVS SNAPSHOT
` ` Patient Information     Patient Name Sex     Kimi Ulloa ([id number]) Female 1960       Room Bed    [facility]      Patient Demographics     Address Phone E-mail Address    [address]  [place] [address] [phone] (Home)  [phone] (Work)  [phone] (Mobile) [email or web address]      Patient Ethnicity & Race     Ethnic Group Patient Race    American White      PCP and Center    Primary Care Provider  Phone Center     Patricia Vargas [phone] [address], [place] [address]        Emergency Contact(s)     Name Relation Home Work Mobile    Ramírez Ulloa Spouse none None [phone]    Dwightreshma Rashid Father [phone] none None      Documents on File        Status Date Received Description       Documents for the Patient    ETHAN Face Sheet Received () 05/10/10     Waiver - Payment  05/10/10     Privacy Notice - [facility]  05/10/10     Insurance Card  () 05/10/10     Consent for EHR Access       External Medication Information Consent Accepted 18     Patient ID Received () 14     [facility] Specified Other       Consent for Services - Hospital/Clinic Received () 10/29/13     Insurance Card Received () 14     Consent for EHR Access Received 14     Privacy Notice - [facility] Received 14     Consent for Services - Hospital/Clinic Received () 14     Privacy Notice - [facility]  14 ACKNOWLEDGMENT OF RECEIPT OF NOTICE OF PRIVACY PRACTICE    Patient ID Received () 01/12/15     Consent for Services/Privacy Notice - Hospital/Clinic Received 18     Care Everywhere Prospective Auth Received 18     Insurance Card Received 18 PO    Patient ID Received 18 [place]     Consent for Services - Hospital/Clinic Received (Deleted) 10/29/13        Documents for the Encounter    CMS IM for Patient Signature       CE Point of Care Auth Received        Admission Information     Attending  Provider Admitting Provider Admission Type Admission Date/Time    Paresh Oliveira MD Teynor, Joseph T, MD Elective 02/26/18  0654    Discharge Date Hospital Service Auth/Cert Status Service Area     Surgery Incomplete [facility]    Unit Room/Bed Admission Status       [facility] ORTHO SPEC UNIT 5522/5522-01 Admission (Confirmed)       Admission     Complaint    END STAGE OSTEOARTHRITIS RIGHT KNEE, Total knee replacement status, right      Hospital Account     Name Acct ID Class Status Primary Coverage    Kimi Ulloa [id number] Inpatient Open [facility] ADMIN SERV OPEN ACCESS            Guarantor Account (for Hospital Account #[id number])     Name Relation to Pt Service Area Active? Acct Type    Kimi Ulloa  FCS Yes Personal/Family    Address Phone          [address] [place] [address] [phone](H)  [phone](O)              Coverage Information (for Hospital Account #[id number])     F/O Payor/Plan Precert #    [employer] ADMIN SERV OPEN ACCESS     Subscriber Subscriber #    Ramírez Ulloa [id number]    Address Phone    [address] [address]  [place] [address] [phone]

## 2018-02-26 NOTE — ANESTHESIA PROCEDURE NOTES
Peripheral nerve/Neuraxial procedure note : intrathecal  Pre-Procedure  Performed by YUE CHIU  Location: OR      Pre-Anesthestic Checklist: patient identified, IV checked, risks and benefits discussed, informed consent, monitors and equipment checked and pre-op evaluation    Timeout  Correct Patient: Yes   Correct Procedure: Yes   Correct Site: Yes   Correct Laterality: N/A   Correct Position: Yes   Site Marked: N/A   .   Procedure Documentation    .    Procedure:    Intrathecal.  Insertion Site:L4-5  (midline approach)      Patient Prep;povidone-iodine 7.5% surgical scrub.  .  Needle: Sagrario tip Spinal Needle (gauge): 20  # of attempts: 1 and # of redirects:  Introducer used Introducer: 20 G .       Assessment/Narrative  Paresthesias: No.  .  .  clear CSF fluid removed . Comments:  1% lidocaine for localization.  No complications.

## 2018-02-26 NOTE — IP AVS SNAPSHOT
"[place] [address] [facility] UNIT: [phone]                                              INTERAGENCY TRANSFER FORM - PHYSICIAN ORDERS   2018                    Hospital Admission Date: 2018  NOMI PA   : 1960  Sex: Female        Attending Provider: Paresh Oliveira MD     Allergies:  Bees, Morphine, Vancomycin    Infection:  None   Service:  SURGERY    Ht:  1.676 m (5' 6\")   Wt:  49 kg (108 lb 0.4 oz)   Admission Wt:  49 kg (108 lb 0.4 oz)    BMI:  17.44 kg/m 2   BSA:  1.51 m 2            Patient PCP Information     Provider PCP Type    Patricia Vargas MD General      ED Clinical Impression     Diagnosis Description Comment Added By Time Added    Total knee replacement status, right [Z96.651] Total knee replacement status, right [Z96.651]  Paresh Oliveira MD 2018 12:03 PM    Status post total left knee replacement [Z96.652] Status post total left knee replacement [Z96.652]  Bahman Mendoza 2018  9:31 AM      Hospital Problems as of 3/1/2018              Priority Class Noted POA    Total knee replacement status, right Medium  2018 Yes      Non-Hospital Problems as of 3/1/2018              Priority Class Noted    S/P TKR (total knee replacement) Medium  5/10/2010    Aftercare following joint replacement Medium  5/10/2010    Edema Medium  5/10/2010    Septic joint of left knee joint (H) Medium  2014    Infected prosthetic knee joint (H) Medium  2014    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, implant, and graft (H) Medium  2015      Code Status History     Date Active Date Inactive Code Status Order ID Comments User Context    2014 11:32 AM 2014  5:31 PM Full Code [id number]  Paresh Oliveira MD Inpatient    2014  7:32 PM 2014 11:32 AM Full Code [id number]  Kingsley Lloyd PA-C Inpatient         Medication Review      START taking        Dose / Directions Comments    aspirin 325 MG EC tablet     "    Dose:  325 mg   Take 1 tablet (325 mg) by mouth 2 times daily (before meals)   Quantity:  60 tablet   Refills:  0        cyclobenzaprine 10 MG tablet   Commonly known as:  FLEXERIL        Dose:  10 mg   Take 1 tablet (10 mg) by mouth 3 times daily as needed for muscle spasms   Quantity:  30 tablet   Refills:  0        HYDROmorphone 2 MG tablet   Commonly known as:  DILAUDID        Dose:  2-4 mg   Take 1-2 tablets (2-4 mg) by mouth every 3 hours as needed for moderate to severe pain   Quantity:  70 tablet   Refills:  0        ondansetron 4 MG ODT tab   Commonly known as:  ZOFRAN-ODT        Dose:  4 mg   Take 1 tablet (4 mg) by mouth every 6 hours as needed (Nausea and Vomiting)   Quantity:  10 tablet   Refills:  0        order for DME        Start taking on:  3/22/2018   Equipment being ordered: CPM: Advance as tolerated. Range 0-90 degree flexion Treatment Diagnosis  Total knee   Quantity:  1 Device   Refills:  0        senna-docusate 8.6-50 MG per tablet   Commonly known as:  SENOKOT-S;PERICOLACE        Dose:  1 tablet   Take 1 tablet by mouth 2 times daily as needed for constipation   Quantity:  40 tablet   Refills:  0          CONTINUE these medications which have NOT CHANGED        Dose / Directions Comments    AMOXICILLIN PO        Dose:  2000 mg   Take 2,000 mg by mouth daily as needed (patient takes 4 X 500 mg = 2,000 mg dose) 1 hour prior to dental appt.   Refills:  0        EPINEPHrine 0.3 MG/0.3ML injection 2-pack   Commonly known as:  EPIPEN/ADRENACLICK/or ANY BX GENERIC EQUIV   Used for:  Allergy to bee sting        Dose:  0.3 mg   Inject 0.3 mLs (0.3 mg) into the muscle once as needed for anaphylaxis   Quantity:  1.2 mL   Refills:  11        IBUPROFEN PO   Indication:  infected knee        Dose:  800 mg   Take 800 mg by mouth daily as needed for moderate pain   Refills:  0        KRILL OIL PO        Dose:  1 capsule   Take 1 capsule by mouth every morning   Refills:  0        multivitamin,  therapeutic Tabs tablet        Dose:  1 tablet   Take 1 tablet by mouth daily   Refills:  0        TYLENOL PO        Dose:  650 mg   Take 650 mg by mouth every 6 hours as needed for mild pain or fever   Refills:  0                Summary of Visit     Reason for your hospital stay       Osteoarthritis right Knee       Reason for your hospital stay       tka             After Care     Activity       Your activity upon discharge: activity as tolerated       Diet       Follow this diet upon discharge: Orders Placed This Encounter      Advance Diet as Tolerated: Regular Diet Adult             Follow-Up Appointment Instructions     Future Labs/Procedures    Follow-up and recommended labs and tests      Comments:    Follow up with Dr. Oliveira , at [facility]) , [id number] days  to evaluate after surgery.    Follow-up and recommended labs and tests      Comments:    Follow up with Paresh ellison, within 2 weeks.      Follow-Up Appointment Instructions     Follow-up and recommended labs and tests        Follow up with Dr. Oliveira , at [facility]) , [id number] days  to evaluate after surgery.       Follow-up and recommended labs and tests        Follow up with Paresh ellison, within 2 weeks.             Statement of Approval     Ordered          03/01/18 0723  I have reviewed and agree with all the recommendations and orders detailed in this document.  EFFECTIVE NOW     Approved and electronically signed by:  Paresh Oliveira MD

## 2018-02-26 NOTE — ANESTHESIA PREPROCEDURE EVALUATION
Anesthesia Evaluation     .             ROS/MED HX    ENT/Pulmonary:      (-) sleep apnea   Neurologic:       Cardiovascular:         METS/Exercise Tolerance:     Hematologic:         Musculoskeletal:         GI/Hepatic:        (-) GERD   Renal/Genitourinary:         Endo:         Psychiatric:         Infectious Disease:         Malignancy:         Other:                     Physical Exam  Normal systems: cardiovascular, pulmonary and dental    Airway   Mallampati: II  TM distance: >3 FB  Neck ROM: full    Dental     Cardiovascular   Rhythm and rate: regular      Pulmonary    breath sounds clear to auscultation                    Anesthesia Plan      History & Physical Review  History and physical reviewed and following examination; no interval change.    ASA Status:  1 .    NPO Status:  > 8 hours    Plan for Periph. Nerve Block for postop pain and Spinal   PONV prophylaxis:  Ondansetron (or other 5HT-3)       Postoperative Care  Postoperative pain management:  IV analgesics and Peripheral nerve block (Single Shot).      Consents  Anesthetic plan, risks, benefits and alternatives discussed with:  Patient..                          .

## 2018-02-26 NOTE — PLAN OF CARE
Problem: Patient Care Overview  Goal: Plan of Care/Patient Progress Review  PT: Order received; Initial evaluation completed and treatment initiated POD #0 s/p R TKA. Patient reports prior L TKA. Patient lives in a 3 story home with her spouse and normally independent and active without an assistive device. No recent falls. Has a walker, crutches, and cane from prior surgery.  Discharge Planner PT   Patient plan for discharge: return home with assist and OP PT  Current status: Patient currently needing min A for bed mobility, min/CGA for gait and transfers with use of a rolling walker and knee immobilizer; Good quad strength; no buckling of knee; reports buttocks still numb. Limited initially by nausea and vomiting at EOB but then felt improved. Able to do all R TKA ex's with assist.  Barriers to return to prior living situation: stairs; will have assist  Recommendations for discharge: Home with assist as needed and OP PT  Rationale for recommendations: decreased R knee ROM/strength; impaired independence with functional mobility       Entered by: Tea Horowitz 02/26/2018 5:11 PM

## 2018-02-26 NOTE — ANESTHESIA PROCEDURE NOTES
Peripheral nerve/Neuraxial procedure note : femoral and Adductor canal  Pre-Procedure  Performed by YUE CHIU  Location: pre-op      Pre-Anesthestic Checklist: patient identified, IV checked, site marked, risks and benefits discussed, informed consent, monitors and equipment checked, at physician/surgeon's request and post-op pain management    Timeout  Correct Patient: Yes   Correct Procedure: Yes   Correct Site: Yes   Correct Laterality: Yes   Correct Position: Yes   Site Marked: Yes   .   Procedure Documentation    .    Procedure:  right  Femoral and Adductor canal.  Local skin infiltrated with 3 mL of 1% lidocaine.     Ultrasound used to identify targeted nerve, plexus, or vascular marker and placed a needle adjacent to it., Ultrasound was used to visualize the spread of the anesthetic in close proximity to the above stated nerve. A permanent image is entered into the patient's record.  Patient Prep;mask, sterile gloves, chlorhexidine gluconate and isopropyl alcohol, patient draped.  Nerve Stim: Initial Level 1 mA. Lowest motor response mA..  Needle: insulated, short bevel Needle Gauge: 20.    Needle Length (Inches) 2  Insertion Method: Single Shot.       Assessment/Narrative  Paresthesias: No.  .  The placement was negative for: blood aspirated, painful injection and site bleeding.  Bolus given via needle..   Secured via.   Complications: none. Comments:  Single shot femoral nerve block via adductor canal;  20 ml 0.5% Ropivacaine with 1:400,000 Epinephrine

## 2018-02-26 NOTE — OP NOTE
Procedure Date: 02/26/2018      DATE OF SURGERY:  02/26/2018      PREOPERATIVE DIAGNOSIS:  End-stage osteoarthritis of the right knee.      POSTOPERATIVE DIAGNOSIS:  End-stage osteoarthritis of the right knee.      PROCEDURE:  Right total knee arthroplasty.      SURGEON:  Paresh Oliveira MD      ASSISTANT:  FATIMAH Moe      ANESTHESIA:  Spinal plus nerve block.      ESTIMATED BLOOD LOSS:  Probably 20 mL.      IMPLANT:  DePuy rotating platform size 5 narrow femur, 35 mm oval patellar button.  The tibial component was a size 5 with an 8 mm insert, posterior stabilized.      ANTIBIOTICS:  Two grams Ancef given preop.      DVT PROPHYLAXIS:  Low-dose Lovenox followed by aspirin therapy sequentially and sequential calf compression devices.      Risks, options, alternatives gone over with the patient, proceeded to the operating room, prepped and draped in the usual fashion.  Timeout was asked.  We proceeded then tourniquet inflated to 250 after exsanguination.  Very small statured leg.  Small straight midline incision made, medial parapatellar arthrotomy.  To avoid excessive tearing extended proximal and distal a bit.  Evacuated a large area of synovial fluid and inspection of the joint.  Endstage patellofemoral joint and the posterior tibial plateau was depressed about a 1 cm of wear, posterior condyle the same.   Popliteus is gone.  ACL chronically torn.      Intramedullary guide was used to produce a 5 degree valgus cut of the distal femur, referencing the epicondylar axis.  Anteroposterior chamfers made.  Box opening placed, tibia is cut perpendicular to its long axis referencing from extramedullary guiding system and preoperative templating.  Minimal resection is tried to take out minimal bone on all aspects of this.  Irrigated copiously.  Cement anchoring holes placed.  Trials were then done, further release is done.  It was a little bit tight in extension and slight laxity in flexion.  Double and triple  checking that we were able to correct that.  Withdrew the trials.  Injected Marcaine in the posterior capsule, about 25 mL, 25 mL elsewhere, total of 50.  Once the cement cured, removed the excessive cement.  Slight release IT band as well as medially deep to the pes anserine.  Balance was good.  Flexion and extension space good.  Permanent components placed in there, double and triple checked for cement debris.  Pressure wrap applied for primary closure then done with knee flex.  0 Ethibond, 2-0 Vicryl and 3-0 Vicryl, staples and Dermabond.  Hemovac was placed, brought out superolaterally.  The tranexamic acid injected in the joint, 2 grams into the joint with 30 mL of saline.         ESTEFANIA LUNSFORD MD             D: 2018   T: 2018   MT: GIFTY      Name:     NOMI PA   MRN:      [id number]        Account:        [id number]   :      1960           Procedure Date: 2018      Document: [id number]

## 2018-02-26 NOTE — ANESTHESIA POSTPROCEDURE EVALUATION
Patient: Kimi Ulloa    Procedure(s):  RIGHT TOTAL KNEE ARTHROPLASTY   - Wound Class: I-Clean    Diagnosis:END STAGE OSTEOARTHRITIS RIGHT KNEE  Diagnosis Additional Information: No value filed.    Anesthesia Type:  Periph. Nerve Block for postop pain, Spinal    Note:  Anesthesia Post Evaluation    Patient location during evaluation: PACU  Patient participation: Able to participate in evaluation but full recovery from regional anesthesia has not yet ocurrred but is anticipated to occur within 48 hours  Level of consciousness: awake  Pain management: adequate  Airway patency: patent  Cardiovascular status: acceptable  Respiratory status: acceptable  Hydration status: acceptable  PONV: none     Anesthetic complications: None          Last vitals:  Vitals:    02/26/18 1336 02/26/18 1400 02/26/18 1420   BP:  128/77    Pulse:  62    Resp: 16 16 14   Temp:      SpO2:  97%          Electronically Signed By: YUE CHIU MD  February 26, 2018  3:07 PM

## 2018-02-26 NOTE — OR NURSING
1210hr - MDA Jurek rounded on pt; pt awake and interactive w/MDA. VS & Surgical sites remained stable.  Okay for pt to transfer to floor per  MDA Jurek.

## 2018-02-26 NOTE — ANESTHESIA CARE TRANSFER NOTE
Patient: Kimi Ulloa    Procedure(s):  RIGHT TOTAL KNEE ARTHROPLASTY   - Wound Class: I-Clean    Diagnosis: END STAGE OSTEOARTHRITIS RIGHT KNEE  Diagnosis Additional Information: No value filed.    Anesthesia Type:   Periph. Nerve Block for postop pain, Spinal     Note:  Airway :Face Mask  Patient transferred to:PACU  Comments: VSS      Vitals: (Last set prior to Anesthesia Care Transfer)    CRNA VITALS  2/26/2018 1039 - 2/26/2018 1139      2/26/2018             SpO2: 99 %    Resp Rate (set): 10                Electronically Signed By: CANDY Ashraf CRNA  February 26, 2018  12:16 PM

## 2018-02-26 NOTE — PROGRESS NOTES
02/26/18 1609   Quick Adds   Type of Visit Initial PT Evaluation   Living Environment   Lives With spouse   Living Arrangements house   Home Accessibility stairs to enter home   Number of Stairs to Enter Home 1   Number of Stairs Within Home 14  (can stay on main level)   Transportation Available car;family or friend will provide   Living Environment Comment 3 level home   Self-Care   Usual Activity Tolerance excellent   Current Activity Tolerance poor   Regular Exercise yes   Activity/Exercise Type strength training;other (see comments)  ([place] classes)   Exercise Amount/Frequency daily   Equipment Currently Used at Home none  (has crutches, walker, and cane)   Activity/Exercise/Self-Care Comment patient normally independent and active   Functional Level Prior   Ambulation 0-->independent   Transferring 0-->independent   Toileting 0-->independent   Bathing 0-->independent   Dressing 0-->independent   Eating 0-->independent   Communication 0-->understands/communicates without difficulty   Swallowing 0-->swallows foods/liquids without difficulty   Cognition 0 - no cognition issues reported   Fall history within last six months no   Which of the above functional risks had a recent onset or change? ambulation;transferring   Prior Functional Level Comment normally independent and active   General Information   Onset of Illness/Injury or Date of Surgery - Date 02/26/18   Referring Physician Dr. Paresh Oliveira   Patient/Family Goals Statement return home with assist and OP PT   Pertinent History of Current Problem (include personal factors and/or comorbidities that impact the POC) Patient with End-stage osteoarthritis of the right knee. who underwent R TKA today; Prior L TKA; see medical record for further information   Precautions/Limitations fall precautions; knee immobilizer until independent with SLR   Weight-Bearing Status - RLE weight-bearing as tolerated   General Observations patient with nausea and vomiting upon  sitting   General Info Comments Activity: up in chair   Cognitive Status Examination   Orientation orientation to person, place and time   Level of Consciousness alert   Follows Commands and Answers Questions 100% of the time   Personal Safety and Judgment intact   Memory intact   Pain Assessment   Patient Currently in Pain Yes, see Vital Sign flowsheet  (8/10)   Integumentary/Edema   Integumentary/Edema Comments R knee incision; covered and acewrapped   Posture    Posture Comments WFL   Range of Motion (ROM)   ROM Comment R LE limited by recent surgery and pain; others appear WFL   Strength   Strength Comments R LE limited by recent surgery and pain; others appear WFL   Bed Mobility   Bed Mobility Comments Patient needing Min A for R LE into and OOB with use of knee immobilizer; pain 8/10   Transfer Skills   Transfer Comments sit<>stand with CGA and sequencing and safety cues; use of a walker in standing   Gait   Gait Comments Patient able to ambulate > 50 feet with a rolling walker, min/CGA and safety cues   Balance   Balance Comments current need for a walker and assist   Sensory Examination   Sensory Perception Comments decreased sensation in buttocks per patient report   Modality Interventions   Planned Modality Interventions Comments ice to R knee   General Therapy Interventions   Planned Therapy Interventions bed mobility training;gait training;ROM;strengthening;stretching;transfer training;progressive activity/exercise   Clinical Impression   Criteria for Skilled Therapeutic Intervention yes, treatment indicated   PT Diagnosis impaired gait/transfers   Influenced by the following impairments pain; nausea and vomiting; decreased R knee ROM/strength   Functional limitations due to impairments impaired independence with functional mobility   Clinical Presentation Stable/Uncomplicated   Clinical Presentation Rationale min A or less for mobility; supportive living environment; medically stable   Clinical Decision  "Making (Complexity) Low complexity   Therapy Frequency` 2 times/day   Predicted Duration of Therapy Intervention (days/wks) 2-3 days   Anticipated Equipment Needs at Discharge front wheeled walker;crutches  (has both at home)   Anticipated Discharge Disposition Home with Assist;Home with Outpatient Therapy   Risk & Benefits of therapy have been explained Yes   Patient, Family & other staff in agreement with plan of care Yes   [facility] TM \"6 Clicks\"   2016, Trustees of [facility], under license to "[facility]".  All rights reserved.   6 Clicks Short Forms Basic Mobility Inpatient Short Form   [facility]  \"6 Clicks\" V.2 Basic Mobility Inpatient Short Form   1. Turning from your back to your side while in a flat bed without using bedrails? 3 - A Little   2. Moving from lying on your back to sitting on the side of a flat bed without using bedrails? 3 - A Little   3. Moving to and from a bed to a chair (including a wheelchair)? 3 - A Little   4. Standing up from a chair using your arms (e.g., wheelchair, or bedside chair)? 3 - A Little   5. To walk in hospital room? 3 - A Little   6. Climbing 3-5 steps with a railing? 3 - A Little   Basic Mobility Raw Score (Score out of 24.Lower scores equate to lower levels of function) 18   Total Evaluation Time   Total Evaluation Time (Minutes) 10     "

## 2018-02-26 NOTE — PROGRESS NOTES
Admission medication history interview status for the 2/26/2018  admission is complete. See EPIC admission navigator for prior to admission medications     Medication history source reliability:Good    Medication history interview source(s):Patient    Medication history resources (including written lists, pill bottles, clinic record):None    Primary pharmacy.[facility]    Additional medication history information not noted on PTA med list :None    Time spent in this activity: 30 minutes    Prior to Admission medications    Medication Sig Last Dose Taking? Auth Provider   Acetaminophen (TYLENOL PO) Take 650 mg by mouth every 6 hours as needed for mild pain or fever 2/25/2018 at am Yes Reported, Patient   AMOXICILLIN PO Take 2,000 mg by mouth daily as needed (patient takes 4 X 500 mg = 2,000 mg dose) 1 hour prior to dental appt. 2/21/2018 at am Yes Reported, Patient   KRILL OIL PO Take 1 capsule by mouth every morning 2/25/2018 at am Yes Reported, Patient   EPINEPHrine (EPIPEN/ADRENACLICK/OR ANY BX GENERIC EQUIV) 0.3 MG/0.3ML injection 2-pack Inject 0.3 mLs (0.3 mg) into the muscle once as needed for anaphylaxis Over 1 year ago at prn Yes Patricia Vargas MD   multivitamin, therapeutic (THERA-VIT) TABS Take 1 tablet by mouth daily 2/25/2018 at am Yes Unknown, Entered By History   IBUPROFEN PO Take 800 mg by mouth daily as needed for moderate pain  2/21/2018 at am Yes Reported, Patient

## 2018-02-26 NOTE — IP AVS SNAPSHOT
MRN:[id number]                      After Visit Summary   2/26/2018    Kimi Ulloa    MRN: [id number]           Thank you!     Thank you for choosing [facility] for your care. Our goal is always to provide you with excellent care. Hearing back from our patients is one way we can continue to improve our services. Please take a few minutes to complete the written survey that you may receive in the mail after you visit with us. Thank you!        Patient Information     Date Of Birth          1960        Designated Caregiver       Most Recent Value    Caregiver    Will someone help with your care after discharge? yes    Name of designated caregiver Bill        Phone number of caregiver [phone]  pager    Caregiver address [address] [place] [address]      About your hospital stay     You were admitted on:  February 26, 2018 You last received care in the:  [facility] [address] [facility]    You were discharged on:  March 1, 2018        Reason for your hospital stay       Osteoarthritis right Knee            Reason for your hospital stay       tka            Reason for your hospital stay       tka                  Who to Call     For medical emergencies, please call 911.  For non-urgent questions about your medical care, please call your primary care provider or clinic, [phone]  For questions related to your surgery, please call your surgery clinic        Attending Provider     Provider Specialty    Paresh Oliveira MD Surgery       Primary Care Provider Office Phone # Fax #    Patricia Camilo Vargas [phone]      After Care Instructions     Activity       Your activity upon discharge: activity as tolerated            Diet       Follow this diet upon discharge: Orders Placed This Encounter      Advance Diet as Tolerated: Regular Diet Adult                  Follow-up Appointments     Follow-up and recommended labs and tests        Follow up with Dr. Oliveira ,  "at [place] office) , [id number] days  to evaluate after surgery.            Follow-up and recommended labs and tests        Follow up with me,  Paresh Oliveira, within 2 weeks.                  Additional Services     Home Care PT Referral for Hospital Discharge       PT to eval and treat  Post  tka     Your provider has ordered home care - physical therapy. If you have not been contacted within 2 days of your discharge please call the department phone number listed on the top of this document.    Please  See for  4  Visits  Starting  Friday March 2, 2018                  Further instructions from your care team       TOTAL KNEE REPLACEMENT TAKE HOME INSTRUCTIONS  Your surgeon will answer any questions about your progress. General guidelines for your care are listed below. Your surgeon may give additional instructions for your care at home. Please follow them carefully    Activity Level  1. Physical activity may be resumed gradually according to your comfort level and your surgeon s instructions. Follow your exercise program as instructed by your therapist. Do exercises at least twice a day. you may ice your knee after exercising.  2. Complete exercises two hours before bedtime to minimize the effect pain may have on sleep.  Refer to pages 19-22 of you \"Total Knee Replacement\" booklet for details  3. Do not wear your knee immobilizer unless your doctor has specifically told you to continue it.    Good Health Practices  1. Maintain an adequate fluid intake and eat a well balanced diet.  2. Be sure to include the basic food groups, such as dairy products, meat/fish, vegetables, and fruit. Each of these foods contribute to your wound healing and increasing your strength.  3. Surgery, decreased activity and pain medication all contribute to a decrease in bowel activity that can result in constipation. It is recommended that you increase your liquid intake, add fiber to your diet, increase activity, and decrease pain " "medication use. If you have any problems, notify your physician.  4. Wear your anti-embolism stockings day and night until seen by your surgeon if you were issued these at discharge.  (Not all patients will have anti-embolism stockings) Remove twice a day for one hour at a time. You may hand wash and air dry your stockings.  5. Notify your dentist of your total knee surgery and call your dentist one week before a dental appointment for antibiotics, if your dentist will not prescribe antibiotics then call your surgeon to ask for next steps.      Incision/Dressing Care  1. Keep incision clean and dry per surgeons instructions  2. Cover incision if you are still having drainage.  3.  If you have a waterproof dressing __________________________   Shower.    Things to Watch For  1. Check incision daily for increased redness, tenderness, swelling, or drainage along the incision line. If these occur, please notify your doctor. Also, call if you develop a fever above 101 .  2. Please notify your doctor if you experience any calf pain and/or if you have surgical pain not relieved by the pain medication prescribed by your doctor.    Home care arranged by:  [facility]  Phone number #[phone]    Pending Results     No orders found from 2/24/2018 to 2/27/2018.            Statement of Approval     Ordered          03/01/18 0723  I have reviewed and agree with all the recommendations and orders detailed in this document.  EFFECTIVE NOW     Approved and electronically signed by:  Paresh Oliveira MD             Admission Information     Date & Time Provider Department Dept. Phone    2/26/2018 Paresh Oliveira MD [facility] [address] [facility] [phone]      Your Vitals Were     Blood Pressure Pulse Temperature Respirations Height Weight    157/87 (BP Location: Right arm) 117 99.7  F (37.6  C) (Oral) 16 1.676 m (5' 6\") 49 kg (108 lb 0.4 oz)    Pulse Oximetry BMI (Body Mass Index)                98% 17.44 " "kg/m2          MyChart Information     [employer] lets you send messages to your doctor, view your test results, renew your prescriptions, schedule appointments and more. To sign up, go to [email or web address] . Click on \"Log in\" on the left side of the screen, which will take you to the Welcome page. Then click on \"Sign up Now\" on the right side of the page.     You will be asked to enter the access code listed below, as well as some personal information. Please follow the directions to create your username and password.     Your access code is: [email or web address]  Expires: 2018 10:02 AM     Your access code will  in 90 days. If you need help or a new code, please call your [facility] clinic or [phone].        Care EveryWhere ID     This is your Care EveryWhere ID. This could be used by other organizations to access your [facility] medical records  [id number]        Equal Access to Services     KAYLIN GARNICA AH: Hadii yanet goldmano Soshanon, waaxda luqadaha, qaybta kaalmada adeegyafabiola, tyler lara . So [facility] [phone].    ATENCIÓN: Si habla español, tiene a borien disposición servicios gratuitos de asistencia lingüística. Llame al [phone].    We comply with applicable federal civil rights laws and [place] laws. We do not discriminate on the basis of race, color, national origin, age, disability, sex, sexual orientation, or gender identity.               Review of your medicines      START taking        Dose / Directions    aspirin 325 MG EC tablet        Dose:  325 mg   Take 1 tablet (325 mg) by mouth 2 times daily (before meals)   Quantity:  60 tablet   Refills:  0       cephALEXin 500 MG capsule   Commonly known as:  KEFLEX        Dose:  500 mg   Take 1 capsule (500 mg) by mouth 3 times daily for 5 days   Quantity:  15 capsule   Refills:  0       cyclobenzaprine 10 MG tablet   Commonly known as:  FLEXERIL        Dose:  10 mg   Take 1 tablet (10 mg) by mouth 3 times daily as " needed for muscle spasms   Quantity:  30 tablet   Refills:  0       HYDROmorphone 2 MG tablet   Commonly known as:  DILAUDID        Dose:  2-4 mg   Take 1-2 tablets (2-4 mg) by mouth every 3 hours as needed for moderate to severe pain   Quantity:  70 tablet   Refills:  0       ondansetron 4 MG ODT tab   Commonly known as:  ZOFRAN-ODT        Dose:  4 mg   Take 1 tablet (4 mg) by mouth every 6 hours as needed (Nausea and Vomiting)   Quantity:  10 tablet   Refills:  0       order for DME        Start taking on:  3/22/2018   Equipment being ordered: CPM: Advance as tolerated. Range 0-90 degree flexion Treatment Diagnosis  Total knee   Quantity:  1 Device   Refills:  0       oxyCODONE 10 MG 12 hr tablet   Commonly known as:  OxyCONTIN        Dose:  10 mg   Start taking on:  3/2/2018   Take 1 tablet (10 mg) by mouth every 12 hours as needed for moderate to severe pain   Quantity:  14 tablet   Refills:  0       senna-docusate 8.6-50 MG per tablet   Commonly known as:  SENOKOT-S;PERICOLACE        Dose:  1 tablet   Take 1 tablet by mouth 2 times daily as needed for constipation   Quantity:  40 tablet   Refills:  0         CONTINUE these medicines which have NOT CHANGED        Dose / Directions    AMOXICILLIN PO        Dose:  2000 mg   Take 2,000 mg by mouth daily as needed (patient takes 4 X 500 mg = 2,000 mg dose) 1 hour prior to dental appt.   Refills:  0       EPINEPHrine 0.3 MG/0.3ML injection 2-pack   Commonly known as:  EPIPEN/ADRENACLICK/or ANY BX GENERIC EQUIV   Used for:  Allergy to bee sting        Dose:  0.3 mg   Inject 0.3 mLs (0.3 mg) into the muscle once as needed for anaphylaxis   Quantity:  1.2 mL   Refills:  11       IBUPROFEN PO   Indication:  infected knee        Dose:  800 mg   Take 800 mg by mouth daily as needed for moderate pain   Refills:  0       KRILL OIL PO        Dose:  1 capsule   Take 1 capsule by mouth every morning   Refills:  0       multivitamin, therapeutic Tabs tablet        Dose:  1  tablet   Take 1 tablet by mouth daily   Refills:  0       TYLENOL PO        Dose:  650 mg   Take 650 mg by mouth every 6 hours as needed for mild pain or fever   Refills:  0            Where to get your medicines      These medications were sent to [facility] [place], [place] - [address]  [address] [place] [address]     Phone:  [phone]     aspirin 325 MG EC tablet    cephALEXin 500 MG capsule    cyclobenzaprine 10 MG tablet    ondansetron 4 MG ODT tab    senna-docusate 8.6-50 MG per tablet         Some of these will need a paper prescription and others can be bought over the counter. Ask your nurse if you have questions.     Bring a paper prescription for each of these medications     HYDROmorphone 2 MG tablet    order for DME    oxyCODONE 10 MG 12 hr tablet                Protect others around you: Learn how to safely use, store and throw away your medicines at www.disposemymeds.org.        ANTIBIOTIC INSTRUCTION     You've Been Prescribed an Antibiotic - Now What?  Your healthcare team thinks that you or your loved one might have an infection. Some infections can be treated with antibiotics, which are powerful, life-saving drugs. Like all medications, antibiotics have side effects and should only be used when necessary. There are some important things you should know about your antibiotic treatment.      Your healthcare team may run tests before you start taking an antibiotic.    Your team may take samples (e.g., from your blood, urine or other areas) to run tests to look for bacteria. These test can be important to determine if you need an antibiotic at all and, if you do, which antibiotic will work best.      Within a few days, your healthcare team might change or even stop your antibiotic.    Your team may start you on an antibiotic while they are working to find out what is making you sick.    Your team might change your antibiotic because test results show that a  different antibiotic would be better to treat your infection.    In some cases, once your team has more information, they learn that you do not need an antibiotic at all. They may find out that you don't have an infection, or that the antibiotic you're taking won't work against your infection. For example, an infection caused by a virus can't be treated with antibiotics. Staying on an antibiotic when you don't need it is more likely to be harmful than helpful.      You may experience side effects from your antibiotic.    Like all medications, antibiotics have side effects. Some of these can be serious.    Let you healthcare team know if you have any known allergies when you are admitted to the hospital.    One significant side effect of nearly all antibiotics is the risk of severe and sometimes deadly diarrhea caused by Clostridium difficile (C. Difficile). This occurs when a person takes antibiotics because some good germs are destroyed. Antibiotic use allows C. diificile to take over, putting patients at high risk for this serious infection.    As a patient or caregiver, it is important to understand your or your loved one's antibiotic treatment. It is especially important for caregivers to speak up when patients can't speak for themselves. Here are some important questions to ask your healthcare team.    What infection is this antibiotic treating and how do you know I have that infection?    What side effects might occur from this antibiotic?    How long will I need to take this antibiotic?    Is it safe to take this antibiotic with other medications or supplements (e.g., vitamins) that I am taking?     Are there any special directions I need to know about taking this antibiotic? For example, should I take it with food?    How will I be monitored to know whether my infection is responding to the antibiotic?    What tests may help to make sure the right antibiotic is prescribed for me?      Information provided  by:  www.cdc.gov/getsmart  U.S. Department of Health and Human Services  Centers for disease Control and Prevention  National Center for Emerging and Zoonotic Infectious Diseases  Division of Healthcare Quality Promotion        Information about OPIOIDS     PRESCRIPTION OPIOIDS: WHAT YOU NEED TO KNOW    Prescription opioids can be used to help relieve moderate to severe pain and are often prescribed following a surgery or injury, or for certain health conditions. These medications can be an important part of treatment but also come with serious risks. It is important to work with your health care provider to make sure you are getting the safest, most effective care.    WHAT ARE THE RISKS AND SIDE EFFECTS OF OPIOID USE?  Prescription opioids carry serious risks of addiction and overdose, especially with prolonged use. An opioid overdose, often marked by slowed breathing can cause sudden death. The use of prescription opioids can have a number of side effects as well, even when taken as directed:      Tolerance - meaning you might need to take more of a medication for the same pain relief    Physical dependence - meaning you have symptoms of withdrawal when a medication is stopped    Increased sensitivity to pain    Constipation    Nausea, vomiting, and dry mouth    Sleepiness and dizziness    Confusion    Depression    Low levels of testosterone that can result in lower sex drive, energy, and strength    Itching and sweating    RISKS ARE GREATER WITH:    History of drug misuse, substance use disorder, or overdose    Mental health conditions (such as depression or anxiety)    Sleep apnea    Older age (65 years or older)    Pregnancy    Avoid alcohol while taking prescription opioids.   Also, unless specifically advised by your health care provider, medications to avoid include:    Benzodiazepines (such as Xanax or Valium)    Muscle relaxants (such as Soma or Flexeril)    Hypnotics (such as Ambien or Lunesta)    Other  prescription opioids    KNOW YOUR OPTIONS:  Talk to your health care provider about ways to manage your pain that do not involve prescription opioids. Some of these options may actually work better and have fewer risks and side effects:    Pain relievers such as acetaminophen, ibuprofen, and naproxen    Some medications that are also used for depression or seizures    Physical therapy and exercise    Cognitive behavioral therapy, a psychological, goal-directed approach, in which patients learn how to modify physical, behavioral, and emotional triggers of pain and stress    IF YOU ARE PRESCRIBED OPIOIDS FOR PAIN:    Never take opioids in greater amounts or more often than prescribed    Follow up with your primary health care provider and work together to create a plan on how to manage your pain.    Talk about ways to help manage your pain that do not involve prescription opioids    Talk about all concerns and side effects    Help prevent misuse and abuse    Never sell or share prescription opioids    Never use another person's prescription opioids    Store prescription opioids in a secure place and out of reach of others (this may include visitors, children, friends, and family)    Visit www.cdc.gov/drugoverdose to learn about risks of opioid abuse and overdose    If you believe you may be struggling with addiction, tell your health care provider and ask for guidance or call [facility]'s National Helpline at 3-423-487-HELP    LEARN MORE / www.cdc.gov/drugoverdose/prescribing/guideline.html    Safely dispose of unused prescription opioids: Find your local drug take-back programs and more information about the importance of safe disposal at www.doseofreality.[place].gov             Medication List: This is a list of all your medications and when to take them. Check marks below indicate your daily home schedule. Keep this list as a reference.      Medications           Morning Afternoon Evening Bedtime As Needed    AMOXICILLIN  PO   Take 2,000 mg by mouth daily as needed (patient takes 4 X 500 mg = 2,000 mg dose) 1 hour prior to dental appt.   Next Dose Due:  Resume                                aspirin 325 MG EC tablet   Take 1 tablet (325 mg) by mouth 2 times daily (before meals)   Last time this was given:  325 mg on 3/1/2018  4:29 PM   Next Dose Due:  3/2                                      cephALEXin 500 MG capsule   Commonly known as:  KEFLEX   Take 1 capsule (500 mg) by mouth 3 times daily for 5 days   Next Dose Due:  3/1/2018            3/2  At 8am       3/2  At 3pm           3/1   at 10pm             cyclobenzaprine 10 MG tablet   Commonly known as:  FLEXERIL   Take 1 tablet (10 mg) by mouth 3 times daily as needed for muscle spasms   Last time this was given:  10 mg on 3/1/2018  4:29 PM   Next Dose Due:  3/1/18                            Not earlier than 11:30       EPINEPHrine 0.3 MG/0.3ML injection 2-pack   Commonly known as:  EPIPEN/ADRENACLICK/or ANY BX GENERIC EQUIV   Inject 0.3 mLs (0.3 mg) into the muscle once as needed for anaphylaxis   Next Dose Due:  Resume                                   HYDROmorphone 2 MG tablet   Commonly known as:  DILAUDID   Take 1-2 tablets (2-4 mg) by mouth every 3 hours as needed for moderate to severe pain   Last time this was given:  4 mg on 3/1/2018  4:29 PM   Next Dose Due:  3/1/2018                            3/1/18  Not Earlier than    7:30pm       IBUPROFEN PO   Take 800 mg by mouth daily as needed for moderate pain   Next Dose Due:  Resume                                   KRILL OIL PO   Take 1 capsule by mouth every morning   Next Dose Due:  Resume                                multivitamin, therapeutic Tabs tablet   Take 1 tablet by mouth daily   Next Dose Due:  Resume                                  ondansetron 4 MG ODT tab   Commonly known as:  ZOFRAN-ODT   Take 1 tablet (4 mg) by mouth every 6 hours as needed (Nausea and Vomiting)                                   order for  DME   Equipment being ordered: CPM: Advance as tolerated. Range 0-90 degree flexion Treatment Diagnosis  Total knee   Start taking on:  3/22/2018                                oxyCODONE 10 MG 12 hr tablet   Commonly known as:  OxyCONTIN   Take 1 tablet (10 mg) by mouth every 12 hours as needed for moderate to severe pain   Start taking on:  3/2/2018            3/2/18    At 9am                 3/2/18  At 9pm           senna-docusate 8.6-50 MG per tablet   Commonly known as:  SENOKOT-S;PERICOLACE   Take 1 tablet by mouth 2 times daily as needed for constipation   Next Dose Due:  Resume                                   TYLENOL PO   Take 650 mg by mouth every 6 hours as needed for mild pain or fever   Last time this was given:  650 mg on 3/1/2018  4:29 PM   Next Dose Due:  3/1/2018                            3/1/18    Not Earlier than    10:30pm

## 2018-02-26 NOTE — PLAN OF CARE
Problem: Patient Care Overview  Goal: Plan of Care/Patient Progress Review  Outcome: No Change  Arrived to the floor at 1230. VSS, CMS intact. Adequate pain control with Dilaudid and Toradol. Tolerating clear liquid diet. Alert and orient x 4.

## 2018-02-27 ENCOUNTER — APPOINTMENT (OUTPATIENT)
Dept: PHYSICAL THERAPY | Facility: CLINIC | Age: 58
DRG: 470 | End: 2018-02-27
Attending: ORTHOPAEDIC SURGERY
Payer: COMMERCIAL

## 2018-02-27 LAB
GLUCOSE SERPL-MCNC: 100 MG/DL (ref 70–99)
HGB BLD-MCNC: 11 G/DL (ref 11.7–15.7)

## 2018-02-27 PROCEDURE — 85018 HEMOGLOBIN: CPT | Performed by: ORTHOPAEDIC SURGERY

## 2018-02-27 PROCEDURE — 82947 ASSAY GLUCOSE BLOOD QUANT: CPT | Performed by: ORTHOPAEDIC SURGERY

## 2018-02-27 PROCEDURE — 25000132 ZZH RX MED GY IP 250 OP 250 PS 637: Performed by: ORTHOPAEDIC SURGERY

## 2018-02-27 PROCEDURE — 25000128 H RX IP 250 OP 636: Performed by: ORTHOPAEDIC SURGERY

## 2018-02-27 PROCEDURE — 40000193 ZZH STATISTIC PT WARD VISIT: Performed by: PHYSICAL THERAPY ASSISTANT

## 2018-02-27 PROCEDURE — 12000007 ZZH R&B INTERMEDIATE

## 2018-02-27 PROCEDURE — 36415 COLL VENOUS BLD VENIPUNCTURE: CPT | Performed by: ORTHOPAEDIC SURGERY

## 2018-02-27 PROCEDURE — 27210995 ZZH RX 272: Performed by: ORTHOPAEDIC SURGERY

## 2018-02-27 PROCEDURE — 97110 THERAPEUTIC EXERCISES: CPT | Mod: GP | Performed by: PHYSICAL THERAPY ASSISTANT

## 2018-02-27 RX ORDER — HYDROMORPHONE HYDROCHLORIDE 2 MG/1
2-4 TABLET ORAL
Status: DISCONTINUED | OUTPATIENT
Start: 2018-02-27 | End: 2018-03-01 | Stop reason: HOSPADM

## 2018-02-27 RX ORDER — KETOROLAC TROMETHAMINE 30 MG/ML
30 INJECTION, SOLUTION INTRAMUSCULAR; INTRAVENOUS EVERY 6 HOURS PRN
Status: COMPLETED | OUTPATIENT
Start: 2018-02-27 | End: 2018-02-28

## 2018-02-27 RX ADMIN — OXYCODONE HYDROCHLORIDE 10 MG: 5 TABLET ORAL at 08:35

## 2018-02-27 RX ADMIN — Medication 0.5 MG: at 04:25

## 2018-02-27 RX ADMIN — KETOROLAC TROMETHAMINE 30 MG: 30 INJECTION, SOLUTION INTRAMUSCULAR at 20:18

## 2018-02-27 RX ADMIN — ENOXAPARIN SODIUM 30 MG: 30 INJECTION SUBCUTANEOUS at 08:35

## 2018-02-27 RX ADMIN — OXYCODONE HYDROCHLORIDE 10 MG: 5 TABLET ORAL at 02:28

## 2018-02-27 RX ADMIN — CEFAZOLIN SODIUM 1 G: 10 INJECTION, POWDER, FOR SOLUTION INTRAVENOUS at 00:29

## 2018-02-27 RX ADMIN — Medication 0.5 MG: at 12:58

## 2018-02-27 RX ADMIN — KETOROLAC TROMETHAMINE 15 MG: 15 INJECTION, SOLUTION INTRAMUSCULAR; INTRAVENOUS at 02:28

## 2018-02-27 RX ADMIN — CYCLOBENZAPRINE HYDROCHLORIDE 10 MG: 10 TABLET, FILM COATED ORAL at 03:31

## 2018-02-27 RX ADMIN — CYCLOBENZAPRINE HYDROCHLORIDE 10 MG: 10 TABLET, FILM COATED ORAL at 12:14

## 2018-02-27 RX ADMIN — Medication 0.5 MG: at 06:27

## 2018-02-27 RX ADMIN — Medication 0.5 MG: at 00:27

## 2018-02-27 RX ADMIN — KETOROLAC TROMETHAMINE 15 MG: 15 INJECTION, SOLUTION INTRAMUSCULAR; INTRAVENOUS at 08:35

## 2018-02-27 RX ADMIN — OXYCODONE HYDROCHLORIDE 10 MG: 5 TABLET ORAL at 12:13

## 2018-02-27 RX ADMIN — Medication 0.5 MG: at 17:11

## 2018-02-27 RX ADMIN — KETOROLAC TROMETHAMINE 30 MG: 30 INJECTION, SOLUTION INTRAMUSCULAR at 14:04

## 2018-02-27 RX ADMIN — HYDROMORPHONE HYDROCHLORIDE 4 MG: 2 TABLET ORAL at 15:00

## 2018-02-27 RX ADMIN — SODIUM CHLORIDE: 9 INJECTION, SOLUTION INTRAVENOUS at 08:17

## 2018-02-27 RX ADMIN — CYCLOBENZAPRINE HYDROCHLORIDE 10 MG: 10 TABLET, FILM COATED ORAL at 19:06

## 2018-02-27 NOTE — PLAN OF CARE
Problem: Patient Care Overview  Goal: Plan of Care/Patient Progress Review   Pt. adamantly refused PT this PM due to pt. reporting pain, despite Max encouragement, after declining further session this AM. Thoroughly discussed with pt the importance of moving the knee and the process of a TKA progression.

## 2018-02-27 NOTE — PROGRESS NOTES
Kimi MI Omantonio  2018  POD #1    No immediate surgical complications identified.  Tolerating physical therapy and rehabilitation well.  Temperatures:  Current - Temp: 98.5  F (36.9  C); Max - Temp  Av  F (36.7  C)  Min: 97.4  F (36.3  C)  Max: 98.6  F (37  C)  Pulse range: Pulse  Av.4  Min: 62  Max: 78  Blood pressure range: Systolic (24hrs), Av , Min:106 , Max:147   ; Diastolic (24hrs), Av, Min:50, Max:85    CMS: intact  Labs:   Results for orders placed or performed during the hospital encounter of 18 (from the past 24 hour(s))   Platelet count   Result Value Ref Range    Platelet Count 247 150 - 450 10e9/L   Creatinine   Result Value Ref Range    Creatinine 0.58 0.52 - 1.04 mg/dL    GFR Estimate >90 >60 mL/min/1.7m2    GFR Estimate If Black >90 >60 mL/min/1.7m2   Hemoglobin   Result Value Ref Range    Hemoglobin 11.0 (L) 11.7 - 15.7 g/dL   Glucose   Result Value Ref Range    Glucose 100 (H) 70 - 99 mg/dL       PLAN:  Discharge plan:   Home    Home  cpm   out  Pt  pt

## 2018-02-27 NOTE — PLAN OF CARE
Problem: Patient Care Overview  Goal: Plan of Care/Patient Progress Review  Outcome: Improving  Pt is AAOx4, pain is severe throughout night but is now controlled with dilaudid q2hrs. CMS intact, dressing CDI, VSS, will continue to monitor.

## 2018-02-27 NOTE — PLAN OF CARE
"Problem: Patient Care Overview  Goal: Plan of Care/Patient Progress Review  Outcome: No Change  VSS.  Incision to the pt's (R) knee is C/D/I with staples, drain removed per MD orders.  Pt c/o pain to her (R) knee at a \"10\", PA notified and pain medications were changed.  Pt states her pain is much better after 30mg of IV Toradol had been administered per MD orders.  Pt is due to void, bladder scanned for 269 cc of urine, encouraged the pt to drink more fluid, pt refused a catheter at this time, will continue to monitor.      "

## 2018-02-27 NOTE — PROVIDER NOTIFICATION
"Call placed to Pascual KLEIN regarding the pt's pain control.  Pt states her pain is at a \"10\", pt had been given oxycodone, toradol, flexeril, and IV dilaudid per MD orders and no pain relief noted.  PA stated he would call me back.  "

## 2018-02-27 NOTE — PLAN OF CARE
Problem: Patient Care Overview  Goal: Plan of Care/Patient Progress Review  Discharge Planner PT   Patient plan for discharge: Return home with assist and OP PT.  Current status: Pt. in bed upon PT arrival, states she was in severe pain all night and and didn't want to aggravate it. Pt. agreed to try supine TKA ex. Performed ankle pumps, quad sets x10 ea, initiated heel slides and attempted AAROM, pt. very guarded and stated she wants to try this afternoon and declined further PT this AM.  Barriers to return to prior living situation: Stairs; need to assess.  Recommendations for discharge: Home with assist as needed and OP PT per PT POC.  Rationale for recommendations: Decreased R ROM/strength; impaired IND with functional mobility.        Entered by: Linda Nails 02/27/2018 9:32 AM

## 2018-02-27 NOTE — PLAN OF CARE
Problem: Patient Care Overview  Goal: Plan of Care/Patient Progress Review  OT: Order received, chart reviewed. PT attempted to work with pt with OT present. Pt adamantly declining to work with therapy this afternoon due to significant pain. OT eval and intervention held for today.

## 2018-02-27 NOTE — PLAN OF CARE
Problem: Knee Arthroplasty (Total, Partial) (Adult)  Goal: Signs and Symptoms of Listed Potential Problems Will be Absent, Minimized or Managed (Knee Arthroplasty)  Signs and symptoms of listed potential problems will be absent, minimized or managed by discharge/transition of care (reference Knee Arthroplasty (Total, Partial) (Adult) CPG).   Outcome: Improving  Patient up with assist of 1 and walker.  Ambulated in [place]; denied lightheadedness, but nausea and vomiting with changes in position.  Zofran given x1.  Pain managed with IV Dilaudid and Toradol.  VSS on RA.  A/Ox4.  Dressing CDI.  CMS intact.  Crowell and hemovac intact and patent.  Poor PO intake; NS infusing@100.  Aroma therapy utilized, as well as sea bands.

## 2018-02-28 ENCOUNTER — APPOINTMENT (OUTPATIENT)
Dept: PHYSICAL THERAPY | Facility: CLINIC | Age: 58
DRG: 470 | End: 2018-02-28
Attending: ORTHOPAEDIC SURGERY
Payer: COMMERCIAL

## 2018-02-28 ENCOUNTER — TELEPHONE (OUTPATIENT)
Dept: FAMILY MEDICINE | Facility: CLINIC | Age: 58
End: 2018-02-28

## 2018-02-28 ENCOUNTER — APPOINTMENT (OUTPATIENT)
Dept: OCCUPATIONAL THERAPY | Facility: CLINIC | Age: 58
DRG: 470 | End: 2018-02-28
Attending: ORTHOPAEDIC SURGERY
Payer: COMMERCIAL

## 2018-02-28 LAB
ALBUMIN SERPL-MCNC: 2.8 G/DL (ref 3.4–5)
ALP SERPL-CCNC: 56 U/L (ref 40–150)
ALT SERPL W P-5'-P-CCNC: 21 U/L (ref 0–50)
ANION GAP SERPL CALCULATED.3IONS-SCNC: 9 MMOL/L (ref 3–14)
AST SERPL W P-5'-P-CCNC: 22 U/L (ref 0–45)
BILIRUB SERPL-MCNC: 0.4 MG/DL (ref 0.2–1.3)
BUN SERPL-MCNC: 10 MG/DL (ref 7–30)
CALCIUM SERPL-MCNC: 8.2 MG/DL (ref 8.5–10.1)
CHLORIDE SERPL-SCNC: 103 MMOL/L (ref 94–109)
CO2 SERPL-SCNC: 23 MMOL/L (ref 20–32)
CREAT SERPL-MCNC: 0.54 MG/DL (ref 0.52–1.04)
GFR SERPL CREATININE-BSD FRML MDRD: >90 ML/MIN/1.7M2
GLUCOSE SERPL-MCNC: 95 MG/DL (ref 70–99)
HGB BLD-MCNC: 10.6 G/DL (ref 11.7–15.7)
POTASSIUM SERPL-SCNC: 3.9 MMOL/L (ref 3.4–5.3)
PROT SERPL-MCNC: 5.8 G/DL (ref 6.8–8.8)
SODIUM SERPL-SCNC: 135 MMOL/L (ref 133–144)

## 2018-02-28 PROCEDURE — 40000193 ZZH STATISTIC PT WARD VISIT: Performed by: PHYSICAL THERAPY ASSISTANT

## 2018-02-28 PROCEDURE — 97116 GAIT TRAINING THERAPY: CPT | Mod: GP | Performed by: PHYSICAL THERAPIST

## 2018-02-28 PROCEDURE — 12000007 ZZH R&B INTERMEDIATE

## 2018-02-28 PROCEDURE — 36415 COLL VENOUS BLD VENIPUNCTURE: CPT | Performed by: ORTHOPAEDIC SURGERY

## 2018-02-28 PROCEDURE — 97535 SELF CARE MNGMENT TRAINING: CPT | Mod: GO | Performed by: OCCUPATIONAL THERAPIST

## 2018-02-28 PROCEDURE — 25000132 ZZH RX MED GY IP 250 OP 250 PS 637: Performed by: ORTHOPAEDIC SURGERY

## 2018-02-28 PROCEDURE — 25000128 H RX IP 250 OP 636: Performed by: ORTHOPAEDIC SURGERY

## 2018-02-28 PROCEDURE — 97116 GAIT TRAINING THERAPY: CPT | Mod: GP | Performed by: PHYSICAL THERAPY ASSISTANT

## 2018-02-28 PROCEDURE — 80053 COMPREHEN METABOLIC PANEL: CPT | Performed by: ORTHOPAEDIC SURGERY

## 2018-02-28 PROCEDURE — 40000193 ZZH STATISTIC PT WARD VISIT: Performed by: PHYSICAL THERAPIST

## 2018-02-28 PROCEDURE — 97110 THERAPEUTIC EXERCISES: CPT | Mod: GP | Performed by: PHYSICAL THERAPY ASSISTANT

## 2018-02-28 PROCEDURE — 97165 OT EVAL LOW COMPLEX 30 MIN: CPT | Mod: GO | Performed by: OCCUPATIONAL THERAPIST

## 2018-02-28 PROCEDURE — 97110 THERAPEUTIC EXERCISES: CPT | Mod: GP | Performed by: PHYSICAL THERAPIST

## 2018-02-28 PROCEDURE — 85018 HEMOGLOBIN: CPT | Performed by: ORTHOPAEDIC SURGERY

## 2018-02-28 PROCEDURE — 40000133 ZZH STATISTIC OT WARD VISIT: Performed by: OCCUPATIONAL THERAPIST

## 2018-02-28 RX ORDER — ONDANSETRON 4 MG/1
4 TABLET, ORALLY DISINTEGRATING ORAL EVERY 6 HOURS PRN
Qty: 10 TABLET | Refills: 0 | Status: SHIPPED | OUTPATIENT
Start: 2018-02-28 | End: 2021-04-29

## 2018-02-28 RX ORDER — CYCLOBENZAPRINE HCL 10 MG
10 TABLET ORAL 3 TIMES DAILY PRN
Qty: 30 TABLET | Refills: 0 | Status: SHIPPED | OUTPATIENT
Start: 2018-02-28 | End: 2021-04-29

## 2018-02-28 RX ORDER — HYDROMORPHONE HYDROCHLORIDE 2 MG/1
2-4 TABLET ORAL
Qty: 70 TABLET | Refills: 0 | Status: ON HOLD | OUTPATIENT
Start: 2018-02-28 | End: 2018-03-24

## 2018-02-28 RX ORDER — ASPIRIN 325 MG
325 TABLET, DELAYED RELEASE (ENTERIC COATED) ORAL
Qty: 60 TABLET | Refills: 0 | Status: ON HOLD | OUTPATIENT
Start: 2018-02-28 | End: 2018-03-24

## 2018-02-28 RX ORDER — AMOXICILLIN 250 MG
1 CAPSULE ORAL 2 TIMES DAILY PRN
Qty: 40 TABLET | Refills: 0 | Status: SHIPPED | OUTPATIENT
Start: 2018-02-28 | End: 2021-04-29

## 2018-02-28 RX ADMIN — HYDROMORPHONE HYDROCHLORIDE 4 MG: 2 TABLET ORAL at 09:18

## 2018-02-28 RX ADMIN — ASPIRIN 325 MG: 325 TABLET, DELAYED RELEASE ORAL at 09:18

## 2018-02-28 RX ADMIN — CYCLOBENZAPRINE HYDROCHLORIDE 10 MG: 10 TABLET, FILM COATED ORAL at 00:28

## 2018-02-28 RX ADMIN — HYDROMORPHONE HYDROCHLORIDE 2 MG: 2 TABLET ORAL at 23:43

## 2018-02-28 RX ADMIN — KETOROLAC TROMETHAMINE 30 MG: 30 INJECTION, SOLUTION INTRAMUSCULAR at 04:56

## 2018-02-28 RX ADMIN — HYDROMORPHONE HYDROCHLORIDE 4 MG: 2 TABLET ORAL at 04:50

## 2018-02-28 RX ADMIN — ASPIRIN 325 MG: 325 TABLET, DELAYED RELEASE ORAL at 16:00

## 2018-02-28 RX ADMIN — HYDROMORPHONE HYDROCHLORIDE 4 MG: 2 TABLET ORAL at 00:28

## 2018-02-28 RX ADMIN — HYDROMORPHONE HYDROCHLORIDE 4 MG: 2 TABLET ORAL at 15:59

## 2018-02-28 RX ADMIN — HYDROMORPHONE HYDROCHLORIDE 4 MG: 2 TABLET ORAL at 12:45

## 2018-02-28 RX ADMIN — CYCLOBENZAPRINE HYDROCHLORIDE 10 MG: 10 TABLET, FILM COATED ORAL at 15:59

## 2018-02-28 RX ADMIN — HYDROMORPHONE HYDROCHLORIDE 4 MG: 2 TABLET ORAL at 19:49

## 2018-02-28 NOTE — TELEPHONE ENCOUNTER
I got a notification from [facility] that the Epi-pen auto injector was not being covered by the patient's [employer] [facility] benefits. I looked at the [employer] formulary online and it shows they cover the generic epinephrine 0.3 mg auto injector at Tier 1 and no PA is needed for this. I had the [facility] pharmacist try processing this and they think they might just need to contact their pharmacy help desk to get it to go through.     They will be in contact with me if they are still having issues or if we need to ultimately need to complete a PA.    Jose Luis Hart, CMA

## 2018-02-28 NOTE — PLAN OF CARE
Problem: Patient Care Overview  Goal: Plan of Care/Patient Progress Review  Outcome: Improving  Pt is AAOx4, resting comfortably througout night, pain is much more controlled with po dilaudid compared to yesterday. CMS intact, assisted to BR x1, voiding adequately dressing dried drainage. Will continue to monitor.

## 2018-02-28 NOTE — CONSULTS
"BRIEF NUTRITION ASSESSMENT      REASON FOR ASSESSMENT:  RN consult - law albumin    NUTRITION HISTORY:  Pt states she follows a regular diet at home, no restrictions.  She was eating well PTA.    CURRENT DIET AND INTAKE:  Diet:  Regular  Poor intake since admit. She ordered a deli sandwich for dinner 2/26.  Yesterday she ordered only 1 meal, another deli sandwich and she ate 1/2.  She did not order breakfast today. For lunch she order another deli sandwich and it was delivered during our visit.  She has not ordered any beverages with her sandwiches.  Pt is interested in a protein drink, will send a smoothie this afternoon and she can order PRN.              ANTHROPOMETRICS:  Height: 5' 6\"  Weight: 49 kg - admit  BMI: 17.44 kg/m2  IBW:  59 kg +/- 10%  Weight Status: Underweight BMI <18.5  %IBW: 83%  Weight History: Not sure admit wt is accurate. Pt states her usual wt is ~115# and has been stable.  Wt Readings from Last 10 Encounters:   02/26/18 49 kg (108 lb 0.4 oz)   02/22/18 52.9 kg (116 lb 9.6 oz)   01/12/15 53.5 kg (118 lb)   07/18/14 54 kg (119 lb 0.8 oz)         LABS:  Alb 2.8 (L) -  Not a reliable indicator of malnutrition, it is a negative acute-phase reactant - it decreases when inflammation is present and rises when it is resolved.      MALNUTRITION:  Patient does not meet two of the following criteria necessary for diagnosing malnutrition: significant weight loss, reduced intake, subcutaneous fat loss, muscle loss or fluid retention    NUTRITION INTERVENTION:  Nutrition Diagnosis:  No nutrition diagnosis at this time.    Implementation:  Nutrition Education:  Discussed protein sources for healing  Will send protein supplements (smoothies) for PM snacks and PRN    FOLLOW UP/MONITORING:   Will re-evaluate in 7 - 10 days, or sooner, if re-consulted.    Sudha Brown RD  Pager [phone] (M-F)            [phone] (W/E & Hol)            "

## 2018-02-28 NOTE — PLAN OF CARE
Problem: Patient Care Overview  Goal: Plan of Care/Patient Progress Review  Outcome: Therapy, progress towards functional goals is fair  OT: Pt is a 57 yea old female admitted for a right TKA.  Had left one done 10 years ago.  Lives alone in a two story home with basement.  Planning on returning home with help from a girlfriend.    Discharge Planner OT   Patient plan for discharge: Home with help from girlfriend  Current status: Initial evaluation complete.  Pt in bed, fatigued but willing to work.  Treatment started to work on ADLs.  Came up to EOB with SBA.  Practiced LE dressing.  Able to bend from her waist to don and doff clothing without use of  AE.  Does have reacher at home if she needs it.  Also educated on EC techniques for dressing.  Too fatigued to try out of bed activities.  Will need to try toilet and car transfers in next sesison.    Barriers to return to prior living situation: Stairs to bedroom  Recommendations for discharge: Home with assist of friend  Rationale for recommendations: Pt has had experience with this type of surgery and understands techniques.  Anticipate will progress to a point where she can go home with assistance.       Entered by: Robert Hills 02/28/2018 3:31 PM

## 2018-02-28 NOTE — PLAN OF CARE
Problem: Patient Care Overview  Goal: Plan of Care/Patient Progress Review  Discharge Planner PT   Patient plan for discharge: Home with assist and OP PT  Current status: Min A for sit to/from supine and sit to/from stand with LE. Needs SBA for ambulation with FWW, cues for gait sequence. AAROM R knee 30-60. Pt. Requires encouragement to complete TKA ex's with Min-ModA.   Barriers to return to prior living situation: Decreased ROM, stairs. Will have assist.  Recommendations for discharge: Home with assist as needed and OP PT per PT POC.  Rationale for recommendations: decreased R knee ROM/strength; impaired independence with functional mobility       Entered by: Linda Nails 02/28/2018 12:19 PM

## 2018-02-28 NOTE — PROGRESS NOTES
Kimi MI Omgoyoalley  2018  POD #2    No immediate surgical complications identified.  Needs  To get  Up  And  Do more in  Pt  To avoid complications    Temperatures:  Current - Temp: 98.4  F (36.9  C); Max - Temp  Av.5  F (36.9  C)  Min: 97.8  F (36.6  C)  Max: 99  F (37.2  C)  Pulse range: Pulse  Av  Min: 79  Max: 85  Blood pressure range: Systolic (24hrs), Av , Min:142 , Max:166   ; Diastolic (24hrs), Av, Min:76, Max:88    CMS: intact  Labs:   Results for orders placed or performed during the hospital encounter of 18 (from the past 24 hour(s))   Hemoglobin   Result Value Ref Range    Hemoglobin 10.6 (L) 11.7 - 15.7 g/dL   Comprehensive metabolic panel   Result Value Ref Range    Sodium 135 133 - 144 mmol/L    Potassium 3.9 3.4 - 5.3 mmol/L    Chloride 103 94 - 109 mmol/L    Carbon Dioxide 23 20 - 32 mmol/L    Anion Gap 9 3 - 14 mmol/L    Glucose 95 70 - 99 mg/dL    Urea Nitrogen 10 7 - 30 mg/dL    Creatinine 0.54 0.52 - 1.04 mg/dL    GFR Estimate >90 >60 mL/min/1.7m2    GFR Estimate If Black >90 >60 mL/min/1.7m2    Calcium 8.2 (L) 8.5 - 10.1 mg/dL    Bilirubin Total 0.4 0.2 - 1.3 mg/dL    Albumin 2.8 (L) 3.4 - 5.0 g/dL    Protein Total 5.8 (L) 6.8 - 8.8 g/dL    Alkaline Phosphatase 56 40 - 150 U/L    ALT 21 0 - 50 U/L    AST 22 0 - 45 U/L       PLAN  D/c home  thursday  Discharge plan: tomorrow,  Home  cpm  Out pt pt  At  [facility] or   [facility]    Consider  dietery  Consult  Low alb,  Tot protein  In  An outwordly healthy  Person   Implies  Inadequate  Intake  On a  Chronic  basis

## 2018-02-28 NOTE — PLAN OF CARE
Problem: Patient Care Overview  Goal: Plan of Care/Patient Progress Review  Outcome: No Change  Vss, AOx4. Incision dressing changed this shift, no new drainage, cdi. Pain in knee managed with toradol, dilaudid, flexeril. Voiding. Regular diet. No nausea. Cms is intact, pulses 2+, no numbness or tingling. Up to the bathroom with assist of 1, gait belt and walker.

## 2018-02-28 NOTE — PROGRESS NOTES
02/28/18 1514   Quick Adds   Type of Visit Initial Occupational Therapy Evaluation   Living Environment   Lives With alone   Living Arrangements house  (2 story home with basement)   Home Accessibility bed not on first floor;stairs to enter home;stairs within home   Number of Stairs to Enter Home 2   Number of Stairs Within Home 12   Stair Railings at Home inside, present on right side   Transportation Available car;family or friend will provide   Living Environment Comment pt anticipates retiring after this surgery, has been independent in ADLS   Self-Care   Dominant Hand right   Usual Activity Tolerance good   Current Activity Tolerance moderate   Regular Exercise yes   Activity/Exercise Type strength training;walking;other (see comments)  (aeorbics)   Exercise Amount/Frequency 1 hr;5-7 times/wk   Equipment Currently Used at Home none   Activity/Exercise/Self-Care Comment pt thinks she has a reacher at home   Functional Level Prior   Ambulation 0-->independent   Transferring 0-->independent   Toileting 0-->independent   Bathing 0-->independent   Dressing 0-->independent   Eating 0-->independent   Communication 0-->understands/communicates without difficulty   Swallowing 0-->swallows foods/liquids without difficulty   Cognition 0 - no cognition issues reported   Fall history within last six months no   Which of the above functional risks had a recent onset or change? ambulation;transferring;bathing;dressing   General Information   Onset of Illness/Injury or Date of Surgery - Date 02/26/18   Referring Physician Paresh Cheatham   Patient/Family Goals Statement pt planning to return home with assist from chuck   Additional Occupational Profile Info/Pertinent History of Current Problem Pt admitted for right TKR.  Had left side done 10 years agol   Precautions/Limitations no known precautions/limitations   General Observations pt laying in bed, fatigued from PT session   Cognitive Status Examination   Orientation  orientation to person, place and time   Level of Consciousness alert   Able to Follow Commands WNL/WFL   Personal Safety (Cognitive) WNL/WFL   Memory intact   Attention No deficits were identified   Visual Perception   Visual Perception No deficits were identified   Pain Assessment   Patient Currently in Pain No   Range of Motion (ROM)   ROM Quick Adds No deficits were identified   ROM Comment B UEs   Strength   Manual Muscle Testing Quick Adds No deficits were identified   Strength Comments B UEs   Hand Strength   Hand Strength Comments gross grasp fair to good, equal   Coordination   Upper Extremity Coordination No deficits were identified   Mobility   Bed Mobility Bed mobility skill: Sit to supine;Bed mobility skill: Supine to sit   Bed Mobility Skill: Sit to Supine   Level of [place]: Sit/Supine stand-by assist   Physical Assist/Nonphysical Assist: Sit/Supine verbal cues;1 person assist   Assistive Device: Sit/Supine bedrail   Bed Mobility Skill: Supine to Sit   Level of [place]: Supine/Sit stand-by assist   Physical Assist/Nonphysical Assist: Supine/Sit verbal cues;1 person assist   Assistive Device: Supine/Sit bedrail   Transfer Skill: Sit to Stand   Level of [place]: Sit/Stand stand-by assist   Physical Assist/Nonphysical Assist: Sit/Stand verbal cues;1 person assist   Transfer Skill: Sit to Stand weight-bearing as tolerated   Assistive Device for Transfer: Sit/Stand rolling walker   Lower Body Dressing   Level of Finley: Dress Lower Body stand-by assist   Physical Assist/Nonphysical Assist: Dress Lower Body verbal cues;1 person assist   Eating/Self Feeding   Level of [place]: Eating independent   Activities of Daily Living Analysis   Impairments Contributing to Impaired Activities of Daily Living ROM decreased;strength decreased   General Therapy Interventions   Planned Therapy Interventions ADL retraining   Clinical Impression   Criteria for Skilled Therapeutic Interventions Met  "yes, treatment indicated   OT Diagnosis decreased independence in ADLS   Influenced by the following impairments decreased ROM, decreased strength   Assessment of Occupational Performance 1-3 Performance Deficits   Identified Performance Deficits decreased independence in dressing, bathroom transfers, household chores   Clinical Decision Making (Complexity) Low complexity   Therapy Frequency daily   Predicted Duration of Therapy Intervention (days/wks) 3 days   Anticipated Discharge Disposition Home with Assist   Risks and Benefits of Treatment have been explained. Yes   Patient, Family & other staff in agreement with plan of care Yes   [facility] \"6 Clicks\"   2016, Trustees of [facility], under license to [employer].  All rights reserved.   6 Clicks Short Forms Daily Activity Inpatient Short Form   [facility]  \"6 Clicks\" Daily Activity Inpatient Short Form   1. Putting on and taking off regular lower body clothing? 3 - A Little   2. Bathing (including washing, rinsing, drying)? 3 - A Little   3. Toileting, which includes using toilet, bedpan or urinal? 3 - A Little   4. Putting on and taking off regular upper body clothing? 3 - A Little   5. Taking care of personal grooming such as brushing teeth? 3 - A Little   6. Eating meals? 4 - None   Daily Activity Raw Score (Score out of 24.Lower scores equate to lower levels of function) 19   Total Evaluation Time   Total Evaluation Time (Minutes) 15     "

## 2018-02-28 NOTE — PROGRESS NOTES
Ortho    Cms intact,  Pain  Still  A challenge    Mod  Bloody drainage   Will s top  lovenox    Ice  More    Stop  Ankle  Pumps  Has  Developed  Mod  Peroneal  Tenderness.

## 2018-02-28 NOTE — PROGRESS NOTES
Kimi SHMULE Artemio  2018  POD #2 right total Knee Arthroplasty    Doing well.  Clean wound without signs of infection.  Normal healing wound.  No immediate surgical complications identified.  No excessive bleeding  Pain well-controlled today  Tolerating physical therapy and rehabilitation will continue today, none yesterday due to pain  Neg olivier  Temperatures:  Current - Temp: 98.4  F (36.9  C); Max - Temp  Av.5  F (36.9  C)  Min: 97.8  F (36.6  C)  Max: 99  F (37.2  C)  Pulse range: Pulse  Av  Min: 79  Max: 85  Blood pressure range: Systolic (24hrs), Av , Min:142 , Max:166   ; Diastolic (24hrs), Av, Min:76, Max:88    CMS:  Labs:   Results for orders placed or performed during the hospital encounter of 18 (from the past 24 hour(s))   Hemoglobin   Result Value Ref Range    Hemoglobin 10.6 (L) 11.7 - 15.7 g/dL   Comprehensive metabolic panel   Result Value Ref Range    Sodium 135 133 - 144 mmol/L    Potassium 3.9 3.4 - 5.3 mmol/L    Chloride 103 94 - 109 mmol/L    Carbon Dioxide 23 20 - 32 mmol/L    Anion Gap 9 3 - 14 mmol/L    Glucose 95 70 - 99 mg/dL    Urea Nitrogen 10 7 - 30 mg/dL    Creatinine 0.54 0.52 - 1.04 mg/dL    GFR Estimate >90 >60 mL/min/1.7m2    GFR Estimate If Black >90 >60 mL/min/1.7m2    Calcium 8.2 (L) 8.5 - 10.1 mg/dL    Bilirubin Total 0.4 0.2 - 1.3 mg/dL    Albumin 2.8 (L) 3.4 - 5.0 g/dL    Protein Total 5.8 (L) 6.8 - 8.8 g/dL    Alkaline Phosphatase 56 40 - 150 U/L    ALT 21 0 - 50 U/L    AST 22 0 - 45 U/L       PLAN:Home tomorrow

## 2018-03-01 ENCOUNTER — APPOINTMENT (OUTPATIENT)
Dept: OCCUPATIONAL THERAPY | Facility: CLINIC | Age: 58
DRG: 470 | End: 2018-03-01
Attending: ORTHOPAEDIC SURGERY
Payer: COMMERCIAL

## 2018-03-01 ENCOUNTER — APPOINTMENT (OUTPATIENT)
Dept: PHYSICAL THERAPY | Facility: CLINIC | Age: 58
DRG: 470 | End: 2018-03-01
Attending: ORTHOPAEDIC SURGERY
Payer: COMMERCIAL

## 2018-03-01 VITALS
RESPIRATION RATE: 16 BRPM | SYSTOLIC BLOOD PRESSURE: 157 MMHG | BODY MASS INDEX: 17.36 KG/M2 | WEIGHT: 108.03 LBS | TEMPERATURE: 99.7 F | DIASTOLIC BLOOD PRESSURE: 87 MMHG | OXYGEN SATURATION: 98 % | HEIGHT: 66 IN | HEART RATE: 117 BPM

## 2018-03-01 LAB
CREAT SERPL-MCNC: 0.52 MG/DL (ref 0.52–1.04)
GFR SERPL CREATININE-BSD FRML MDRD: >90 ML/MIN/1.7M2
PLATELET # BLD AUTO: 198 10E9/L (ref 150–450)

## 2018-03-01 PROCEDURE — 97530 THERAPEUTIC ACTIVITIES: CPT | Mod: GP | Performed by: PHYSICAL THERAPIST

## 2018-03-01 PROCEDURE — 82565 ASSAY OF CREATININE: CPT | Performed by: ORTHOPAEDIC SURGERY

## 2018-03-01 PROCEDURE — 40000133 ZZH STATISTIC OT WARD VISIT: Performed by: OCCUPATIONAL THERAPY ASSISTANT

## 2018-03-01 PROCEDURE — 36415 COLL VENOUS BLD VENIPUNCTURE: CPT | Performed by: ORTHOPAEDIC SURGERY

## 2018-03-01 PROCEDURE — 25000132 ZZH RX MED GY IP 250 OP 250 PS 637: Performed by: ORTHOPAEDIC SURGERY

## 2018-03-01 PROCEDURE — 97110 THERAPEUTIC EXERCISES: CPT | Mod: GP | Performed by: PHYSICAL THERAPIST

## 2018-03-01 PROCEDURE — 25000125 ZZHC RX 250: Performed by: ORTHOPAEDIC SURGERY

## 2018-03-01 PROCEDURE — 85049 AUTOMATED PLATELET COUNT: CPT | Performed by: ORTHOPAEDIC SURGERY

## 2018-03-01 PROCEDURE — 97535 SELF CARE MNGMENT TRAINING: CPT | Mod: GO | Performed by: OCCUPATIONAL THERAPY ASSISTANT

## 2018-03-01 PROCEDURE — 97116 GAIT TRAINING THERAPY: CPT | Mod: GP | Performed by: PHYSICAL THERAPIST

## 2018-03-01 PROCEDURE — 40000193 ZZH STATISTIC PT WARD VISIT: Performed by: PHYSICAL THERAPIST

## 2018-03-01 RX ORDER — OXYCODONE HCL 10 MG/1
10 TABLET, FILM COATED, EXTENDED RELEASE ORAL EVERY 12 HOURS PRN
Qty: 14 TABLET | Refills: 0 | Status: ON HOLD | OUTPATIENT
Start: 2018-03-02 | End: 2018-03-21

## 2018-03-01 RX ORDER — OXYCODONE HCL 10 MG/1
10 TABLET, FILM COATED, EXTENDED RELEASE ORAL EVERY 12 HOURS PRN
Status: DISCONTINUED | OUTPATIENT
Start: 2018-03-01 | End: 2018-03-01 | Stop reason: HOSPADM

## 2018-03-01 RX ORDER — CEPHALEXIN 500 MG/1
500 CAPSULE ORAL 3 TIMES DAILY
Qty: 15 CAPSULE | Refills: 0 | Status: SHIPPED | OUTPATIENT
Start: 2018-03-01 | End: 2018-03-06

## 2018-03-01 RX ORDER — LIDOCAINE HYDROCHLORIDE 10 MG/ML
10 INJECTION, SOLUTION INFILTRATION; PERINEURAL ONCE
Status: COMPLETED | OUTPATIENT
Start: 2018-03-01 | End: 2018-03-01

## 2018-03-01 RX ADMIN — ASPIRIN 325 MG: 325 TABLET, DELAYED RELEASE ORAL at 16:29

## 2018-03-01 RX ADMIN — ASPIRIN 325 MG: 325 TABLET, DELAYED RELEASE ORAL at 08:04

## 2018-03-01 RX ADMIN — LIDOCAINE HYDROCHLORIDE 10 ML: 10 INJECTION, SOLUTION INFILTRATION; PERINEURAL at 13:42

## 2018-03-01 RX ADMIN — CYCLOBENZAPRINE HYDROCHLORIDE 10 MG: 10 TABLET, FILM COATED ORAL at 16:29

## 2018-03-01 RX ADMIN — HYDROMORPHONE HYDROCHLORIDE 4 MG: 2 TABLET ORAL at 16:29

## 2018-03-01 RX ADMIN — HYDROMORPHONE HYDROCHLORIDE 4 MG: 2 TABLET ORAL at 09:08

## 2018-03-01 RX ADMIN — HYDROMORPHONE HYDROCHLORIDE 4 MG: 2 TABLET ORAL at 05:26

## 2018-03-01 RX ADMIN — ACETAMINOPHEN 650 MG: 325 TABLET, FILM COATED ORAL at 16:29

## 2018-03-01 NOTE — PROGRESS NOTES
Physical Therapy Discharge Summary    Reason for therapy discharge:    Discharged to home.  Looking into home PT versus OP PT pending ability to get transportation.    Progress towards therapy goal(s). See goals on Care Plan in [facility] electronic health record for goal details.  Goals partially met.  Barriers to achieving goals:   discharge from facility.    Therapy recommendation(s):    Continued therapy is recommended.  Rationale/Recommendations:  Focus on ROM as pt discharged with significantly impaired extension and flexion. Hamstring guarding and very painful with movement..   Goals for bed mobility, transfers, gait and stairs adequately met. Pt declined further trial of stairs during last session. Encouraged icing for pain control and completion of exercises in bed before discharging.

## 2018-03-01 NOTE — PROGRESS NOTES
Per order for CPM at discharge, message left at [facility] ([phone]) to have device delivered to patient's home.  Patient states she in not sure if/when discharging, will ask [facility] if signature needed/someone at the house for delivery. Will fax orders to [phone] when [facility] calls back. Triny Powell RN  Addendum: [facility] called back and they will bring the CPM to [facility] 234 beds at the [facility] at noon to fit her.  Orders faxed. Spouse/RN updated. Triny Powell RN    Addendum: PT concerned as patient says she does not have rides to get to the outpatient PT appointments at [facility].  Dr. Oliveira is okay with [facility] for the first week, patient has chosen [facility].  Email referral sent. Triny Powell RN

## 2018-03-01 NOTE — PROGRESS NOTES
[facility]  Met with pt to discuss plans for HC.  Pt to be discharged home today and has agreed to have [facility] follow with services of  PT. Patient care support center processing referral.  Pt verbalized understanding that initial visit is scheduled for 3/2/18. Pt has 24 hour phone number for [facility] for any questions or concerns.

## 2018-03-01 NOTE — PLAN OF CARE
Problem: Patient Care Overview  Goal: Plan of Care/Patient Progress Review  Discharge Planner OT   Patient plan for discharge: Home with help from girlfriend  Current status: pt declined OOB activity, pt feels will be able to complete LE dressing if not will have assist declined to trial dressing, educated on safety with completing shower and toilet transfer, educated on community/online resources for AE needs (RTS) if needed after d/c home. Pt verbalized good understanding of all education.  Pt states will have assist as needed.    Barriers to return to prior living situation: stairs to bedroom  Recommendations for discharge: home with help from friend for ADLS/IADLS per plan established by the Occupational Therapist  Rationale for recommendations: pt will have assist as needed at home for ADLS       Entered by: Altagracia Dias 03/01/2018 11:41 AM   pt to d/c home today with assist as needed, GOALS NOT MET, see discharge summary

## 2018-03-01 NOTE — PROGRESS NOTES
D: Consult acknowledged. Reviewed medical record. Per MD, the recommendation is home with homecare. See CC note.

## 2018-03-01 NOTE — PLAN OF CARE
Problem: Patient Care Overview  Goal: Plan of Care/Patient Progress Review  Outcome: Improving  A/OX4,cms intact.Pain control with PO diluadid.Up with 1 assist/walker.Progressing per plan of the care.DC home today.

## 2018-03-01 NOTE — PROGRESS NOTES
Ortho     Poor  Progress so  Far    willneed  Home  Pt for  Week    tred ti aspirate    No  Significant  Fluid  In  Joint.    Work  On  slr   home cpm  Delivered  To hospital    Has  So thin  Skin  And  No sub  q    rec  High protein  Shakes/ foods   Her  Nutrition  Status  isnt  Optimal

## 2018-03-01 NOTE — PLAN OF CARE
Problem: Patient Care Overview  Goal: Plan of Care/Patient Progress Review  Outcome: Improving  Pt was A & O x 4. Lungs sound clear, bowel sounds active, cms intact except for edema in right foot. Up with 1 and walker. Dressing was changed with shadow drainage. Received tylenol, flexeril and dilaudid for pain. Will continue to monitor.

## 2018-03-01 NOTE — PLAN OF CARE
Problem: Patient Care Overview  Goal: Plan of Care/Patient Progress Review  Outcome: Improving  PT taking PO dilaudid for pain. Up w/ 1 and walker. Regular diet. Dressing changed, CDI. IV removed. Due to discharge this evening @ 6pm.

## 2018-03-01 NOTE — PLAN OF CARE
Problem: Patient Care Overview  Goal: Plan of Care/Patient Progress Review  Discharge Planner PT   Patient plan for discharge: home with friend assistance for a few days; reported during session that she does not have OP PT set up nor a way to get to OP PT  Current status: ROM very painful for patient-currently 18-65 degrees with significant hamstring guarding. Noted little quad function on RLE and patient very uncomfortable during transfers and mobility.   Barriers to return to prior living situation: Pt reports she will not have someone to drive her to/from PT appts but will have assistance for a few days   Recommendations for discharge: home with PT starting right away to address ROM and gait. Preferably OP but home if patient is unable to get to a clinic.   Rationale for recommendations: Significant extension lag at this time with guarded motion. Want to get PT going right away to ensure positive outcomes.        Entered by: Frances Adams 03/01/2018 10:42 AM

## 2018-03-01 NOTE — DISCHARGE INSTRUCTIONS
"TOTAL KNEE REPLACEMENT TAKE HOME INSTRUCTIONS  Your surgeon will answer any questions about your progress. General guidelines for your care are listed below. Your surgeon may give additional instructions for your care at home. Please follow them carefully    Activity Level  1. Physical activity may be resumed gradually according to your comfort level and your surgeon s instructions. Follow your exercise program as instructed by your therapist. Do exercises at least twice a day. you may ice your knee after exercising.  2. Complete exercises two hours before bedtime to minimize the effect pain may have on sleep.  Refer to pages 19-22 of you \"Total Knee Replacement\" booklet for details  3. Do not wear your knee immobilizer unless your doctor has specifically told you to continue it.    Good Health Practices  1. Maintain an adequate fluid intake and eat a well balanced diet.  2. Be sure to include the basic food groups, such as dairy products, meat/fish, vegetables, and fruit. Each of these foods contribute to your wound healing and increasing your strength.  3. Surgery, decreased activity and pain medication all contribute to a decrease in bowel activity that can result in constipation. It is recommended that you increase your liquid intake, add fiber to your diet, increase activity, and decrease pain medication use. If you have any problems, notify your physician.  4. Wear your anti-embolism stockings day and night until seen by your surgeon if you were issued these at discharge.  (Not all patients will have anti-embolism stockings) Remove twice a day for one hour at a time. You may hand wash and air dry your stockings.  5. Notify your dentist of your total knee surgery and call your dentist one week before a dental appointment for antibiotics, if your dentist will not prescribe antibiotics then call your surgeon to ask for next steps.      Incision/Dressing Care  1. Keep incision clean and dry per surgeons " instructions  2. Cover incision if you are still having drainage.  3.  If you have a waterproof dressing __________________________   Shower.    Things to Watch For  1. Check incision daily for increased redness, tenderness, swelling, or drainage along the incision line. If these occur, please notify your doctor. Also, call if you develop a fever above 101 .  2. Please notify your doctor if you experience any calf pain and/or if you have surgical pain not relieved by the pain medication prescribed by your doctor.    Home care arranged by:  [facility]  Phone number #[phone]

## 2018-03-02 NOTE — PLAN OF CARE
Problem: Patient Care Overview  Goal: Plan of Care/Patient Progress Review  Outcome: Adequate for Discharge Date Met: 03/01/18  Pt was A & O x 4. Lungs sound clear, bowel sounds active, cms intact except for edema in right foot. Up with 1 and walker. Dressing  with shadow drainage. Received tylenol, flexeril and dilaudid for pain. Discharge instruction and meds reviewed and given was discharged home.

## 2018-03-05 ENCOUNTER — TELEPHONE (OUTPATIENT)
Dept: FAMILY MEDICINE | Facility: CLINIC | Age: 58
End: 2018-03-05

## 2018-03-05 NOTE — TELEPHONE ENCOUNTER
Total Knee Replacement Status, Right 03/01/2018 ED/IP 0/1  [phone] (home) [phone] (work)

## 2018-03-06 NOTE — TELEPHONE ENCOUNTER
ED / Discharge Outreach Protocol    Patient Contact    Attempt # 1    Was call answered?  No.  Left message on voicemail with information to call me back.    Angie Pedraza RN

## 2018-03-19 ENCOUNTER — HOSPITAL LABORATORY (OUTPATIENT)
Dept: OTHER | Facility: CLINIC | Age: 58
End: 2018-03-19

## 2018-03-19 LAB
APPEARANCE FLD: NORMAL
COLOR FLD: NORMAL
LYMPHOCYTES NFR FLD MANUAL: 38 %
MONOS+MACROS NFR FLD MANUAL: 4 %
NEUTS BAND NFR FLD MANUAL: 55 %
OTHER CELLS FLD MANUAL: 3 %
SPECIMEN SOURCE FLD: NORMAL
WBC # FLD AUTO: 595 /UL

## 2018-03-21 ENCOUNTER — HOSPITAL ENCOUNTER (INPATIENT)
Facility: CLINIC | Age: 58
LOS: 4 days | Discharge: HOME HEALTH | DRG: 905 | End: 2018-03-25
Attending: ORTHOPAEDIC SURGERY | Admitting: ORTHOPAEDIC SURGERY
Payer: COMMERCIAL

## 2018-03-21 DIAGNOSIS — Z96.651 TOTAL KNEE REPLACEMENT STATUS, RIGHT: ICD-10-CM

## 2018-03-21 DIAGNOSIS — T81.30XA WOUND DEHISCENCE: ICD-10-CM

## 2018-03-21 DIAGNOSIS — Z94.5 H/O SKIN GRAFT: Primary | ICD-10-CM

## 2018-03-21 LAB
ANION GAP SERPL CALCULATED.3IONS-SCNC: 8 MMOL/L (ref 3–14)
BUN SERPL-MCNC: 32 MG/DL (ref 7–30)
CALCIUM SERPL-MCNC: 8.8 MG/DL (ref 8.5–10.1)
CHLORIDE SERPL-SCNC: 109 MMOL/L (ref 94–109)
CO2 SERPL-SCNC: 22 MMOL/L (ref 20–32)
CREAT SERPL-MCNC: 0.51 MG/DL (ref 0.52–1.04)
ERYTHROCYTE [DISTWIDTH] IN BLOOD BY AUTOMATED COUNT: 14.2 % (ref 10–15)
ERYTHROCYTE [SEDIMENTATION RATE] IN BLOOD BY WESTERGREN METHOD: 21 MM/H (ref 0–30)
GFR SERPL CREATININE-BSD FRML MDRD: >90 ML/MIN/1.7M2
GLUCOSE SERPL-MCNC: 90 MG/DL (ref 70–99)
HCT VFR BLD AUTO: 33.4 % (ref 35–47)
HGB BLD-MCNC: 10.9 G/DL (ref 11.7–15.7)
MCH RBC QN AUTO: 32.5 PG (ref 26.5–33)
MCHC RBC AUTO-ENTMCNC: 32.6 G/DL (ref 31.5–36.5)
MCV RBC AUTO: 100 FL (ref 78–100)
PLATELET # BLD AUTO: 465 10E9/L (ref 150–450)
POTASSIUM SERPL-SCNC: 4.2 MMOL/L (ref 3.4–5.3)
RBC # BLD AUTO: 3.35 10E12/L (ref 3.8–5.2)
SODIUM SERPL-SCNC: 139 MMOL/L (ref 133–144)
WBC # BLD AUTO: 6.4 10E9/L (ref 4–11)

## 2018-03-21 PROCEDURE — 27210995 ZZH RX 272: Performed by: ORTHOPAEDIC SURGERY

## 2018-03-21 PROCEDURE — 85652 RBC SED RATE AUTOMATED: CPT | Performed by: ORTHOPAEDIC SURGERY

## 2018-03-21 PROCEDURE — 85027 COMPLETE CBC AUTOMATED: CPT | Performed by: ORTHOPAEDIC SURGERY

## 2018-03-21 PROCEDURE — 25000128 H RX IP 250 OP 636: Performed by: ORTHOPAEDIC SURGERY

## 2018-03-21 PROCEDURE — 87040 BLOOD CULTURE FOR BACTERIA: CPT | Performed by: ORTHOPAEDIC SURGERY

## 2018-03-21 PROCEDURE — 36415 COLL VENOUS BLD VENIPUNCTURE: CPT | Performed by: ORTHOPAEDIC SURGERY

## 2018-03-21 PROCEDURE — 12000007 ZZH R&B INTERMEDIATE

## 2018-03-21 PROCEDURE — 80048 BASIC METABOLIC PNL TOTAL CA: CPT | Performed by: ORTHOPAEDIC SURGERY

## 2018-03-21 RX ORDER — SODIUM CHLORIDE, SODIUM LACTATE, POTASSIUM CHLORIDE, CALCIUM CHLORIDE 600; 310; 30; 20 MG/100ML; MG/100ML; MG/100ML; MG/100ML
INJECTION, SOLUTION INTRAVENOUS CONTINUOUS
Status: DISCONTINUED | OUTPATIENT
Start: 2018-03-21 | End: 2018-03-22

## 2018-03-21 RX ORDER — ZOLPIDEM TARTRATE 5 MG/1
5 TABLET ORAL
Status: DISCONTINUED | OUTPATIENT
Start: 2018-03-21 | End: 2018-03-22

## 2018-03-21 RX ORDER — OXYCODONE HYDROCHLORIDE 5 MG/1
5-10 TABLET ORAL
Status: DISCONTINUED | OUTPATIENT
Start: 2018-03-21 | End: 2018-03-22

## 2018-03-21 RX ORDER — CEFAZOLIN SODIUM 1 G
1 VIAL (EA) INJECTION EVERY 8 HOURS
Status: DISCONTINUED | OUTPATIENT
Start: 2018-03-21 | End: 2018-03-22

## 2018-03-21 RX ORDER — NALOXONE HYDROCHLORIDE 0.4 MG/ML
.1-.4 INJECTION, SOLUTION INTRAMUSCULAR; INTRAVENOUS; SUBCUTANEOUS
Status: DISCONTINUED | OUTPATIENT
Start: 2018-03-21 | End: 2018-03-22

## 2018-03-21 RX ADMIN — SODIUM CHLORIDE, POTASSIUM CHLORIDE, SODIUM LACTATE AND CALCIUM CHLORIDE: 600; 310; 30; 20 INJECTION, SOLUTION INTRAVENOUS at 17:30

## 2018-03-21 RX ADMIN — CEFAZOLIN SODIUM 1 G: 10 INJECTION, POWDER, FOR SOLUTION INTRAVENOUS at 18:13

## 2018-03-21 ASSESSMENT — ACTIVITIES OF DAILY LIVING (ADL)
RETIRED_COMMUNICATION: 0-->UNDERSTANDS/COMMUNICATES WITHOUT DIFFICULTY
CHANGE_IN_FUNCTIONAL_STATUS_SINCE_ONSET_OF_CURRENT_ILLNESS/INJURY: NO
SWALLOWING: 0 - SWALLOWS FOODS/LIQUIDS WITHOUT DIFFICULTY
EATING: 0 - INDEPENDENT
DRESS: 0 - INDEPENDENT
TRANSFERRING: 0-->INDEPENDENT
BATHING: 0 - INDEPENDENT
TOILETING: 0 - INDEPENDENT
RETIRED_EATING: 0-->INDEPENDENT
TOILETING: 0-->INDEPENDENT
COMMUNICATION: 0 - UNDERSTANDS/COMMUNICATES WITHOUT DIFFICULTY
AMBULATION: 0 - INDEPENDENT
BATHING: 0-->INDEPENDENT
DRESS: 0-->INDEPENDENT
FALL_HISTORY_WITHIN_LAST_SIX_MONTHS: NO
AMBULATION: 0-->INDEPENDENT
COGNITION: 0 - NO COGNITION ISSUES REPORTED
SWALLOWING: 0-->SWALLOWS FOODS/LIQUIDS WITHOUT DIFFICULTY
TRANSFERRING: 0 - INDEPENDENT

## 2018-03-21 NOTE — PHARMACY-ADMISSION MEDICATION HISTORY
Admission Medication History    Admission medication history interview status for the 3/21/2018 admission is complete. See EPIC admission navigator for prior to admission medications     Medication history source reliability:Good    Actions taken by pharmacist (provider contacted, etc):None     Additional medication history information not noted on PTA med list :None    Medication reconciliation/reorder completed by provider prior to medication history? No    Time spent in this activity: 10 minutes    Prior to Admission medications    Medication Sig Last Dose Taking? Auth Provider   CEPHALEXIN PO Take 500 mg by mouth 3 times daily 3/21/2018 at Lunch Yes Unknown, Entered By History   HYDROmorphone (DILAUDID) 2 MG tablet Take 1-2 tablets (2-4 mg) by mouth every 3 hours as needed for moderate to severe pain Past Week at PRN Yes Paresh Oliveira MD   aspirin  MG EC tablet Take 1 tablet (325 mg) by mouth 2 times daily (before meals) 3/21/2018 at AM Yes Paresh Oliveira MD   ondansetron (ZOFRAN-ODT) 4 MG ODT tab Take 1 tablet (4 mg) by mouth every 6 hours as needed (Nausea and Vomiting)  at PRN Yes Paresh Oliveira MD   senna-docusate (SENOKOT-S;PERICOLACE) 8.6-50 MG per tablet Take 1 tablet by mouth 2 times daily as needed for constipation  at PRN Yes Paresh Oliveira MD   cyclobenzaprine (FLEXERIL) 10 MG tablet Take 1 tablet (10 mg) by mouth 3 times daily as needed for muscle spasms  at PRN Yes Paresh Oliveira MD   Acetaminophen (TYLENOL PO) Take 650 mg by mouth every 6 hours as needed for mild pain or fever 3/21/2018 at Unknown time Yes Reported, Patient   AMOXICILLIN PO Take 2,000 mg by mouth daily as needed (patient takes 4 X 500 mg = 2,000 mg dose) 1 hour prior to dental appt.  at Ppx Yes Reported, Patient   KRILL OIL PO Take 1 capsule by mouth every morning 3/21/2018 at AM Yes Reported, Patient   multivitamin, therapeutic (THERA-VIT) TABS Take 1 tablet by mouth daily 3/21/2018 at AM Yes Unknown, Entered  By History   IBUPROFEN PO Take 800 mg by mouth daily as needed for moderate pain  3/21/2018 at AM Yes Reported, Patient   order for DME Equipment being ordered: CPM: Advance as tolerated. Range 0-90 degree flexion  Treatment Diagnosis  Total knee   Paresh Oliveira MD   EPINEPHrine (EPIPEN/ADRENACLICK/OR ANY BX GENERIC EQUIV) 0.3 MG/0.3ML injection 2-pack Inject 0.3 mLs (0.3 mg) into the muscle once as needed for anaphylaxis   Patricia Vargas MD David S. Cline, PharmD

## 2018-03-21 NOTE — CONSULTS
kenny  Is  In  For  Iv  abx   And  To or in am  For   Debridement  And  Wound vac.   Possible a  Muscle  Flap  To  Cover.    Have asked  Dr Duarte  To see in  Am  And be   Available  If  Flap avery be  Needed.       Her  Pre op  H and P  Was  Reviewed  And  Seems  To be  Up to  Date  And  No  Additions  Needed. New  Labs  Pending.

## 2018-03-21 NOTE — IP AVS SNAPSHOT
[address] [facility]    [address] [place] [address]    Phone:  [phone]                                       After Visit Summary   3/21/2018    Kimi Ulloa    MRN: [id number]           After Visit Summary Signature Page     I have received my discharge instructions, and my questions have been answered. I have discussed any challenges I see with this plan with the nurse or doctor.    ..........................................................................................................................................  Patient/Patient Representative Signature      ..........................................................................................................................................  Patient Representative Print Name and Relationship to Patient    ..................................................               ................................................  Date                                            Time    ..........................................................................................................................................  Reviewed by Signature/Title    ...................................................              ..............................................  Date                                                            Time

## 2018-03-21 NOTE — IP AVS SNAPSHOT
MRN:[id number]                      After Visit Summary   3/21/2018    Kimi Ulloa    MRN: [id number]           Thank you!     Thank you for choosing [facility] for your care. Our goal is always to provide you with excellent care. Hearing back from our patients is one way we can continue to improve our services. Please take a few minutes to complete the written survey that you may receive in the mail after you visit with us. Thank you!        Patient Information     Date Of Birth          1960        Designated Caregiver       Most Recent Value    Caregiver    Will someone help with your care after discharge? yes    Name of designated caregiver     Phone number of caregiver     Caregiver address [address]       About your hospital stay     You were admitted on:  March 21, 2018 You last received care in the:  [place] [address] [facility]    You were discharged on:  March 25, 2018       Who to Call     For medical emergencies, please call 911.  For non-urgent questions about your medical care, please call your primary care provider or clinic, [phone]  For questions related to your surgery, please call your surgery clinic        Attending Provider     Provider Specialty    Paresh Oliveira MD Surgery       Primary Care Provider Office Phone # Fax #    Patricia Camilo Vargas [phone]      After Care Instructions     Activity       Your activity upon discharge: activity as tolerated  Elevate    No rom  Of  Knee till cleared            Diet       Follow this diet upon discharge: Regular            Wound care and dressings       Instructions to care for your wound at home: as directed.                  Follow-up Appointments     Follow-up and recommended labs and tests        Follow up with me,  Paresh Oliveira, within  2  1/2  Weeks    discharge.                  Additional Services     Home care nursing referral       RN skilled  "nursing visit. RN to provide iv abx  And  Labs  As  needed.    If you have any questions at all regarding Home Infusion of antibiotic please contact [facility] at: [phone].  This is their 24 hr nurse line.                  Further instructions from your care team       Change dressing daily with adaptic, gauze, ace wrap, and immobilizer.  May leave skin graft donor dressing in place.  Keep right leg elevated.  See Dr. Duarte in about 10 days, call [phone] for appointment.    Pending Results     Date and Time Order Name Status Description    3/22/2018 1412 Tissue Culture Aerobic Bacterial Preliminary     3/22/2018 1412 Anaerobic bacterial culture Preliminary     3/22/2018 1410 Tissue Culture Aerobic Bacterial Preliminary     3/22/2018 1410 Anaerobic bacterial culture Preliminary     3/21/2018 1637 Blood culture Preliminary     3/21/2018 1637 Blood culture Preliminary     3/19/2018 1015 Hospital - ANAEROBIC BACTERIAL CULTURE Preliminary             Statement of Approval     Ordered          03/24/18 1141  I have reviewed and agree with all the recommendations and orders detailed in this document.  EFFECTIVE NOW     Approved and electronically signed by:  Paresh Oliveira MD             Admission Information     Date & Time Provider Department Dept. Phone    3/21/2018 Paresh Oliveira MD [place] [address] [facility] [phone]      Your Vitals Were     Blood Pressure Pulse Temperature Respirations Weight Pulse Oximetry    105/72 (BP Location: Right arm) 87 97.3  F (36.3  C) (Oral) 16 50 kg (110 lb 3.2 oz) 94%    BMI (Body Mass Index)                   17.79 kg/m2           [employer] Information     [employer] lets you send messages to your doctor, view your test results, renew your prescriptions, schedule appointments and more. To sign up, go to [email or web address] . Click on \"Log in\" on the left side of the screen, which will take you to the Welcome page. Then click on \"Sign " "up Now\" on the right side of the page.     You will be asked to enter the access code listed below, as well as some personal information. Please follow the directions to create your username and password.     Your access code is: [email or web address]  Expires: 2018 11:02 AM     Your access code will  in 90 days. If you need help or a new code, please call your [facility] clinic or [phone].        Care EveryWhere ID     This is your Care EveryWhere ID. This could be used by other organizations to access your [facility] medical records  [id number]        Equal Access to Services     [facility]: Haddominique Wilson, jaison esparza, anju sanders, tyler lara . So [facility] [phone].    ATENCIÓN: Si habla español, tiene a obrien disposición servicios gratuitos de asistencia lingüística. Llame al [phone].    We comply with applicable federal civil rights laws and [place] laws. We do not discriminate on the basis of race, color, national origin, age, disability, sex, sexual orientation, or gender identity.               Review of your medicines      START taking        Dose / Directions    ceFAZolin 1 G/10ML soln for IVP   Commonly known as:  ANCEF   Indication:  Preventative Medication Therapy Used Around Surgery, Infection of the Skin and/or Related Soft Tissue   Used for:  Total knee replacement status, right        Dose:  1 g   Inject 10 mLs (1 g) into the vein every 8 hours for 27 days   Quantity:  810 mL   Refills:  0       zolpidem 5 MG tablet   Commonly known as:  AMBIEN   Used for:  Total knee replacement status, right        Dose:  5 mg   Take 1 tablet (5 mg) by mouth nightly as needed for sleep   Quantity:  14 tablet   Refills:  0         CONTINUE these medicines which may have CHANGED, or have new prescriptions. If we are uncertain of the size of tablets/capsules you have at home, strength may be listed as something that might have changed.        " Dose / Directions    aspirin  MG EC tablet   This may have changed:  when to take this   Used for:  Total knee replacement status, right        Dose:  325 mg   Start taking on:  3/26/2018   Take 1 tablet (325 mg) by mouth daily for 29 days   Quantity:  30 tablet   Refills:  0       HYDROmorphone 2 MG tablet   Commonly known as:  DILAUDID   This may have changed:    - how much to take  - when to take this  - reasons to take this   Used for:  Total knee replacement status, right        Dose:  2 mg   Take 1 tablet (2 mg) by mouth every 6 hours as needed for moderate to severe pain or other (pain control or improvement in physical function. Hold dose for analgesic side effects.)   Quantity:  60 tablet   Refills:  0         CONTINUE these medicines which have NOT CHANGED        Dose / Directions    cyclobenzaprine 10 MG tablet   Commonly known as:  FLEXERIL   Used for:  Total knee replacement status, right        Dose:  10 mg   Take 1 tablet (10 mg) by mouth 3 times daily as needed for muscle spasms   Quantity:  30 tablet   Refills:  0       EPINEPHrine 0.3 MG/0.3ML injection 2-pack   Commonly known as:  EPIPEN/ADRENACLICK/or ANY BX GENERIC EQUIV   Used for:  Allergy to bee sting        Dose:  0.3 mg   Inject 0.3 mLs (0.3 mg) into the muscle once as needed for anaphylaxis   Quantity:  1.2 mL   Refills:  11       IBUPROFEN PO   Indication:  infected knee        Dose:  800 mg   Take 800 mg by mouth daily as needed for moderate pain   Refills:  0       KRILL OIL PO        Dose:  1 capsule   Take 1 capsule by mouth every morning   Refills:  0       multivitamin, therapeutic Tabs tablet        Dose:  1 tablet   Take 1 tablet by mouth daily   Refills:  0       ondansetron 4 MG ODT tab   Commonly known as:  ZOFRAN-ODT   Used for:  Total knee replacement status, right        Dose:  4 mg   Take 1 tablet (4 mg) by mouth every 6 hours as needed (Nausea and Vomiting)   Quantity:  10 tablet   Refills:  0       order for DME    Used for:  Total knee replacement status, right        Equipment being ordered: CPM: Advance as tolerated. Range 0-90 degree flexion Treatment Diagnosis  Total knee   Quantity:  1 Device   Refills:  0       senna-docusate 8.6-50 MG per tablet   Commonly known as:  SENOKOT-S;PERICOLACE   Used for:  Total knee replacement status, right        Dose:  1 tablet   Take 1 tablet by mouth 2 times daily as needed for constipation   Quantity:  40 tablet   Refills:  0       TYLENOL PO        Dose:  650 mg   Take 650 mg by mouth every 6 hours as needed for mild pain or fever   Refills:  0         STOP taking     AMOXICILLIN PO           CEPHALEXIN PO                Where to get your medicines      These medications were sent to [facility] [place], [place] - [address]  [address], [place] [address]     Phone:  [phone]     aspirin  MG EC tablet         Some of these will need a paper prescription and others can be bought over the counter. Ask your nurse if you have questions.     Bring a paper prescription for each of these medications     ceFAZolin 1 G/10ML soln for IVP    HYDROmorphone 2 MG tablet    zolpidem 5 MG tablet                Protect others around you: Learn how to safely use, store and throw away your medicines at www.disposemymeds.org.        ANTIBIOTIC INSTRUCTION     You've Been Prescribed an Antibiotic - Now What?  Your healthcare team thinks that you or your loved one might have an infection. Some infections can be treated with antibiotics, which are powerful, life-saving drugs. Like all medications, antibiotics have side effects and should only be used when necessary. There are some important things you should know about your antibiotic treatment.      Your healthcare team may run tests before you start taking an antibiotic.    Your team may take samples (e.g., from your blood, urine or other areas) to run tests to look for bacteria. These test can be important to  determine if you need an antibiotic at all and, if you do, which antibiotic will work best.      Within a few days, your healthcare team might change or even stop your antibiotic.    Your team may start you on an antibiotic while they are working to find out what is making you sick.    Your team might change your antibiotic because test results show that a different antibiotic would be better to treat your infection.    In some cases, once your team has more information, they learn that you do not need an antibiotic at all. They may find out that you don't have an infection, or that the antibiotic you're taking won't work against your infection. For example, an infection caused by a virus can't be treated with antibiotics. Staying on an antibiotic when you don't need it is more likely to be harmful than helpful.      You may experience side effects from your antibiotic.    Like all medications, antibiotics have side effects. Some of these can be serious.    Let you healthcare team know if you have any known allergies when you are admitted to the hospital.    One significant side effect of nearly all antibiotics is the risk of severe and sometimes deadly diarrhea caused by Clostridium difficile (C. Difficile). This occurs when a person takes antibiotics because some good germs are destroyed. Antibiotic use allows C. diificile to take over, putting patients at high risk for this serious infection.    As a patient or caregiver, it is important to understand your or your loved one's antibiotic treatment. It is especially important for caregivers to speak up when patients can't speak for themselves. Here are some important questions to ask your healthcare team.    What infection is this antibiotic treating and how do you know I have that infection?    What side effects might occur from this antibiotic?    How long will I need to take this antibiotic?    Is it safe to take this antibiotic with other medications or  supplements (e.g., vitamins) that I am taking?     Are there any special directions I need to know about taking this antibiotic? For example, should I take it with food?    How will I be monitored to know whether my infection is responding to the antibiotic?    What tests may help to make sure the right antibiotic is prescribed for me?      Information provided by:  www.cdc.gov/getsmart  U.S. Department of Health and Human Services  Centers for disease Control and Prevention  National Center for Emerging and Zoonotic Infectious Diseases  Division of Healthcare Quality Promotion        Information about OPIOIDS     PRESCRIPTION OPIOIDS: WHAT YOU NEED TO KNOW    Prescription opioids can be used to help relieve moderate to severe pain and are often prescribed following a surgery or injury, or for certain health conditions. These medications can be an important part of treatment but also come with serious risks. It is important to work with your health care provider to make sure you are getting the safest, most effective care.    WHAT ARE THE RISKS AND SIDE EFFECTS OF OPIOID USE?  Prescription opioids carry serious risks of addiction and overdose, especially with prolonged use. An opioid overdose, often marked by slowed breathing can cause sudden death. The use of prescription opioids can have a number of side effects as well, even when taken as directed:      Tolerance - meaning you might need to take more of a medication for the same pain relief    Physical dependence - meaning you have symptoms of withdrawal when a medication is stopped    Increased sensitivity to pain    Constipation    Nausea, vomiting, and dry mouth    Sleepiness and dizziness    Confusion    Depression    Low levels of testosterone that can result in lower sex drive, energy, and strength    Itching and sweating    RISKS ARE GREATER WITH:    History of drug misuse, substance use disorder, or overdose    Mental health conditions (such as depression  or anxiety)    Sleep apnea    Older age (65 years or older)    Pregnancy    Avoid alcohol while taking prescription opioids.   Also, unless specifically advised by your health care provider, medications to avoid include:    Benzodiazepines (such as Xanax or Valium)    Muscle relaxants (such as Soma or Flexeril)    Hypnotics (such as Ambien or Lunesta)    Other prescription opioids    KNOW YOUR OPTIONS:  Talk to your health care provider about ways to manage your pain that do not involve prescription opioids. Some of these options may actually work better and have fewer risks and side effects:    Pain relievers such as acetaminophen, ibuprofen, and naproxen    Some medications that are also used for depression or seizures    Physical therapy and exercise    Cognitive behavioral therapy, a psychological, goal-directed approach, in which patients learn how to modify physical, behavioral, and emotional triggers of pain and stress    IF YOU ARE PRESCRIBED OPIOIDS FOR PAIN:    Never take opioids in greater amounts or more often than prescribed    Follow up with your primary health care provider and work together to create a plan on how to manage your pain.    Talk about ways to help manage your pain that do not involve prescription opioids    Talk about all concerns and side effects    Help prevent misuse and abuse    Never sell or share prescription opioids    Never use another person's prescription opioids    Store prescription opioids in a secure place and out of reach of others (this may include visitors, children, friends, and family)    Visit www.cdc.gov/drugoverdose to learn about risks of opioid abuse and overdose    If you believe you may be struggling with addiction, tell your health care provider and ask for guidance or call [facility]'s National Helpline at 2-879-666-HELP    LEARN MORE / www.cdc.gov/drugoverdose/prescribing/guideline.html    Safely dispose of unused prescription opioids: Find your local drug  take-back programs and more information about the importance of safe disposal at www.doseofreality.[place].gov             Medication List: This is a list of all your medications and when to take them. Check marks below indicate your daily home schedule. Keep this list as a reference.      Medications           Morning Afternoon Evening Bedtime As Needed    aspirin  MG EC tablet   Take 1 tablet (325 mg) by mouth daily for 29 days   Start taking on:  3/26/2018                                   ceFAZolin 1 G/10ML soln for IVP   Commonly known as:  ANCEF   Inject 10 mLs (1 g) into the vein every 8 hours for 27 days   Last time this was given:  1 g on 3/25/2018  8:34 AM   Next Dose Due:  3/25/18 at 4pm                                         cyclobenzaprine 10 MG tablet   Commonly known as:  FLEXERIL   Take 1 tablet (10 mg) by mouth 3 times daily as needed for muscle spasms   Last time this was given:  10 mg on 3/22/2018  8:43 PM                                   EPINEPHrine 0.3 MG/0.3ML injection 2-pack   Commonly known as:  EPIPEN/ADRENACLICK/or ANY BX GENERIC EQUIV   Inject 0.3 mLs (0.3 mg) into the muscle once as needed for anaphylaxis                                   HYDROmorphone 2 MG tablet   Commonly known as:  DILAUDID   Take 1 tablet (2 mg) by mouth every 6 hours as needed for moderate to severe pain or other (pain control or improvement in physical function. Hold dose for analgesic side effects.)   Last time this was given:  4 mg on 3/25/2018  6:23 AM   Next Dose Due:  Anytime                                   IBUPROFEN PO   Take 800 mg by mouth daily as needed for moderate pain                                   KRILL OIL PO   Take 1 capsule by mouth every morning                                   multivitamin, therapeutic Tabs tablet   Take 1 tablet by mouth daily                                   ondansetron 4 MG ODT tab   Commonly known as:  ZOFRAN-ODT   Take 1 tablet (4 mg) by mouth every 6 hours as  needed (Nausea and Vomiting)                                order for DME   Equipment being ordered: CPM: Advance as tolerated. Range 0-90 degree flexion Treatment Diagnosis  Total knee                                senna-docusate 8.6-50 MG per tablet   Commonly known as:  SENOKOT-S;PERICOLACE   Take 1 tablet by mouth 2 times daily as needed for constipation   Last time this was given:  2 tablets on 3/25/2018  8:34 AM                                   TYLENOL PO   Take 650 mg by mouth every 6 hours as needed for mild pain or fever   Last time this was given:  975 mg on 3/25/2018  8:34 AM                                   zolpidem 5 MG tablet   Commonly known as:  AMBIEN   Take 1 tablet (5 mg) by mouth nightly as needed for sleep   Last time this was given:  5 mg on 3/22/2018  2:04 AM

## 2018-03-22 ENCOUNTER — ANESTHESIA (OUTPATIENT)
Dept: SURGERY | Facility: CLINIC | Age: 58
DRG: 905 | End: 2018-03-22
Payer: COMMERCIAL

## 2018-03-22 ENCOUNTER — ANESTHESIA EVENT (OUTPATIENT)
Dept: SURGERY | Facility: CLINIC | Age: 58
DRG: 905 | End: 2018-03-22
Payer: COMMERCIAL

## 2018-03-22 PROBLEM — Z94.5 H/O SKIN GRAFT: Status: ACTIVE | Noted: 2018-03-22

## 2018-03-22 LAB
ALBUMIN SERPL-MCNC: 3.1 G/DL (ref 3.4–5)
ALP SERPL-CCNC: 97 U/L (ref 40–150)
ALT SERPL W P-5'-P-CCNC: 17 U/L (ref 0–50)
ANION GAP SERPL CALCULATED.3IONS-SCNC: 6 MMOL/L (ref 3–14)
AST SERPL W P-5'-P-CCNC: 18 U/L (ref 0–45)
BILIRUB SERPL-MCNC: 0.3 MG/DL (ref 0.2–1.3)
BUN SERPL-MCNC: 26 MG/DL (ref 7–30)
CALCIUM SERPL-MCNC: 8.7 MG/DL (ref 8.5–10.1)
CHLORIDE SERPL-SCNC: 110 MMOL/L (ref 94–109)
CO2 SERPL-SCNC: 24 MMOL/L (ref 20–32)
CREAT SERPL-MCNC: 0.51 MG/DL (ref 0.52–1.04)
GFR SERPL CREATININE-BSD FRML MDRD: >90 ML/MIN/1.7M2
GLUCOSE SERPL-MCNC: 101 MG/DL (ref 70–99)
GRAM STN SPEC: NORMAL
POTASSIUM SERPL-SCNC: 4 MMOL/L (ref 3.4–5.3)
PROT SERPL-MCNC: 6.3 G/DL (ref 6.8–8.8)
SODIUM SERPL-SCNC: 140 MMOL/L (ref 133–144)
SPECIMEN SOURCE: NORMAL
SPECIMEN SOURCE: NORMAL

## 2018-03-22 PROCEDURE — 25000128 H RX IP 250 OP 636: Performed by: ORTHOPAEDIC SURGERY

## 2018-03-22 PROCEDURE — 25000128 H RX IP 250 OP 636: Performed by: ANESTHESIOLOGY

## 2018-03-22 PROCEDURE — 36000056 ZZH SURGERY LEVEL 3 1ST 30 MIN: Performed by: ORTHOPAEDIC SURGERY

## 2018-03-22 PROCEDURE — 36000058 ZZH SURGERY LEVEL 3 EA 15 ADDTL MIN: Performed by: ORTHOPAEDIC SURGERY

## 2018-03-22 PROCEDURE — 87186 SC STD MICRODIL/AGAR DIL: CPT | Performed by: ORTHOPAEDIC SURGERY

## 2018-03-22 PROCEDURE — 25000128 H RX IP 250 OP 636: Performed by: NURSE ANESTHETIST, CERTIFIED REGISTERED

## 2018-03-22 PROCEDURE — 25000125 ZZHC RX 250

## 2018-03-22 PROCEDURE — 27210995 ZZH RX 272: Performed by: ORTHOPAEDIC SURGERY

## 2018-03-22 PROCEDURE — 0KXS0ZZ TRANSFER RIGHT LOWER LEG MUSCLE, OPEN APPROACH: ICD-10-PCS | Performed by: PLASTIC SURGERY

## 2018-03-22 PROCEDURE — 0HBHXZZ EXCISION OF RIGHT UPPER LEG SKIN, EXTERNAL APPROACH: ICD-10-PCS | Performed by: PLASTIC SURGERY

## 2018-03-22 PROCEDURE — 87070 CULTURE OTHR SPECIMN AEROBIC: CPT | Performed by: ORTHOPAEDIC SURGERY

## 2018-03-22 PROCEDURE — 87077 CULTURE AEROBIC IDENTIFY: CPT | Performed by: ORTHOPAEDIC SURGERY

## 2018-03-22 PROCEDURE — 71000012 ZZH RECOVERY PHASE 1 LEVEL 1 FIRST HR: Performed by: ORTHOPAEDIC SURGERY

## 2018-03-22 PROCEDURE — 87075 CULTR BACTERIA EXCEPT BLOOD: CPT | Performed by: ORTHOPAEDIC SURGERY

## 2018-03-22 PROCEDURE — 36415 COLL VENOUS BLD VENIPUNCTURE: CPT | Performed by: ORTHOPAEDIC SURGERY

## 2018-03-22 PROCEDURE — 25000125 ZZHC RX 250: Performed by: NURSE ANESTHETIST, CERTIFIED REGISTERED

## 2018-03-22 PROCEDURE — 87205 SMEAR GRAM STAIN: CPT | Performed by: ORTHOPAEDIC SURGERY

## 2018-03-22 PROCEDURE — 25800025 ZZH RX 258: Performed by: ORTHOPAEDIC SURGERY

## 2018-03-22 PROCEDURE — 37000008 ZZH ANESTHESIA TECHNICAL FEE, 1ST 30 MIN: Performed by: ORTHOPAEDIC SURGERY

## 2018-03-22 PROCEDURE — 27210794 ZZH OR GENERAL SUPPLY STERILE: Performed by: ORTHOPAEDIC SURGERY

## 2018-03-22 PROCEDURE — 0HDKXZZ EXTRACTION OF RIGHT LOWER LEG SKIN, EXTERNAL APPROACH: ICD-10-PCS | Performed by: PLASTIC SURGERY

## 2018-03-22 PROCEDURE — 37000009 ZZH ANESTHESIA TECHNICAL FEE, EACH ADDTL 15 MIN: Performed by: ORTHOPAEDIC SURGERY

## 2018-03-22 PROCEDURE — 12000007 ZZH R&B INTERMEDIATE

## 2018-03-22 PROCEDURE — 25000132 ZZH RX MED GY IP 250 OP 250 PS 637: Performed by: ORTHOPAEDIC SURGERY

## 2018-03-22 PROCEDURE — 25000125 ZZHC RX 250: Performed by: ORTHOPAEDIC SURGERY

## 2018-03-22 PROCEDURE — 80053 COMPREHEN METABOLIC PANEL: CPT | Performed by: ORTHOPAEDIC SURGERY

## 2018-03-22 PROCEDURE — 40000170 ZZH STATISTIC PRE-PROCEDURE ASSESSMENT II: Performed by: ORTHOPAEDIC SURGERY

## 2018-03-22 PROCEDURE — 0HRKX74 REPLACEMENT OF RIGHT LOWER LEG SKIN WITH AUTOLOGOUS TISSUE SUBSTITUTE, PARTIAL THICKNESS, EXTERNAL APPROACH: ICD-10-PCS | Performed by: PLASTIC SURGERY

## 2018-03-22 PROCEDURE — 87176 TISSUE HOMOGENIZATION CULTR: CPT | Performed by: ORTHOPAEDIC SURGERY

## 2018-03-22 RX ORDER — NALOXONE HYDROCHLORIDE 0.4 MG/ML
.1-.4 INJECTION, SOLUTION INTRAMUSCULAR; INTRAVENOUS; SUBCUTANEOUS
Status: DISCONTINUED | OUTPATIENT
Start: 2018-03-22 | End: 2018-03-22

## 2018-03-22 RX ORDER — ALBUTEROL SULFATE 0.83 MG/ML
2.5 SOLUTION RESPIRATORY (INHALATION) EVERY 4 HOURS PRN
Status: DISCONTINUED | OUTPATIENT
Start: 2018-03-22 | End: 2018-03-22 | Stop reason: HOSPADM

## 2018-03-22 RX ORDER — CYCLOBENZAPRINE HCL 10 MG
10 TABLET ORAL 3 TIMES DAILY PRN
Status: DISCONTINUED | OUTPATIENT
Start: 2018-03-22 | End: 2018-03-22

## 2018-03-22 RX ORDER — NALOXONE HYDROCHLORIDE 0.4 MG/ML
.1-.4 INJECTION, SOLUTION INTRAMUSCULAR; INTRAVENOUS; SUBCUTANEOUS
Status: DISCONTINUED | OUTPATIENT
Start: 2018-03-22 | End: 2018-03-23

## 2018-03-22 RX ORDER — SODIUM CHLORIDE 9 MG/ML
INJECTION, SOLUTION INTRAVENOUS CONTINUOUS
Status: DISCONTINUED | OUTPATIENT
Start: 2018-03-22 | End: 2018-03-23

## 2018-03-22 RX ORDER — KETOROLAC TROMETHAMINE 15 MG/ML
15 INJECTION, SOLUTION INTRAMUSCULAR; INTRAVENOUS ONCE
Status: COMPLETED | OUTPATIENT
Start: 2018-03-22 | End: 2018-03-22

## 2018-03-22 RX ORDER — ACETAMINOPHEN 10 MG/ML
1000 INJECTION, SOLUTION INTRAVENOUS ONCE
Status: COMPLETED | OUTPATIENT
Start: 2018-03-22 | End: 2018-03-22

## 2018-03-22 RX ORDER — ONDANSETRON 2 MG/ML
INJECTION INTRAMUSCULAR; INTRAVENOUS PRN
Status: DISCONTINUED | OUTPATIENT
Start: 2018-03-22 | End: 2018-03-22

## 2018-03-22 RX ORDER — EPHEDRINE SULFATE 50 MG/ML
INJECTION, SOLUTION INTRAMUSCULAR; INTRAVENOUS; SUBCUTANEOUS PRN
Status: DISCONTINUED | OUTPATIENT
Start: 2018-03-22 | End: 2018-03-22

## 2018-03-22 RX ORDER — BUPIVACAINE HYDROCHLORIDE AND EPINEPHRINE 2.5; 5 MG/ML; UG/ML
INJECTION, SOLUTION INFILTRATION; PERINEURAL PRN
Status: DISCONTINUED | OUTPATIENT
Start: 2018-03-22 | End: 2018-03-22 | Stop reason: HOSPADM

## 2018-03-22 RX ORDER — ONDANSETRON 4 MG/1
4 TABLET, ORALLY DISINTEGRATING ORAL EVERY 6 HOURS PRN
Status: DISCONTINUED | OUTPATIENT
Start: 2018-03-22 | End: 2018-03-25 | Stop reason: HOSPADM

## 2018-03-22 RX ORDER — LIDOCAINE 40 MG/G
CREAM TOPICAL
Status: DISCONTINUED | OUTPATIENT
Start: 2018-03-22 | End: 2018-03-24

## 2018-03-22 RX ORDER — ACETAMINOPHEN 325 MG/1
650 TABLET ORAL EVERY 4 HOURS PRN
Status: DISCONTINUED | OUTPATIENT
Start: 2018-03-25 | End: 2018-03-25 | Stop reason: HOSPADM

## 2018-03-22 RX ORDER — PROCHLORPERAZINE MALEATE 5 MG
10 TABLET ORAL EVERY 6 HOURS PRN
Status: DISCONTINUED | OUTPATIENT
Start: 2018-03-22 | End: 2018-03-25 | Stop reason: HOSPADM

## 2018-03-22 RX ORDER — CYCLOBENZAPRINE HCL 10 MG
10 TABLET ORAL 3 TIMES DAILY PRN
Status: DISCONTINUED | OUTPATIENT
Start: 2018-03-22 | End: 2018-03-25 | Stop reason: HOSPADM

## 2018-03-22 RX ORDER — ONDANSETRON 2 MG/ML
4 INJECTION INTRAMUSCULAR; INTRAVENOUS EVERY 30 MIN PRN
Status: DISCONTINUED | OUTPATIENT
Start: 2018-03-22 | End: 2018-03-22 | Stop reason: HOSPADM

## 2018-03-22 RX ORDER — ONDANSETRON 4 MG/1
4 TABLET, ORALLY DISINTEGRATING ORAL EVERY 30 MIN PRN
Status: DISCONTINUED | OUTPATIENT
Start: 2018-03-22 | End: 2018-03-22 | Stop reason: HOSPADM

## 2018-03-22 RX ORDER — HYDROMORPHONE HYDROCHLORIDE 2 MG/1
2-4 TABLET ORAL
Status: DISCONTINUED | OUTPATIENT
Start: 2018-03-22 | End: 2018-03-25 | Stop reason: HOSPADM

## 2018-03-22 RX ORDER — KETOROLAC TROMETHAMINE 30 MG/ML
30 INJECTION, SOLUTION INTRAMUSCULAR; INTRAVENOUS EVERY 6 HOURS
Status: COMPLETED | OUTPATIENT
Start: 2018-03-22 | End: 2018-03-23

## 2018-03-22 RX ORDER — HYDRALAZINE HYDROCHLORIDE 20 MG/ML
2.5-5 INJECTION INTRAMUSCULAR; INTRAVENOUS EVERY 10 MIN PRN
Status: DISCONTINUED | OUTPATIENT
Start: 2018-03-22 | End: 2018-03-22 | Stop reason: HOSPADM

## 2018-03-22 RX ORDER — SODIUM CHLORIDE, SODIUM LACTATE, POTASSIUM CHLORIDE, CALCIUM CHLORIDE 600; 310; 30; 20 MG/100ML; MG/100ML; MG/100ML; MG/100ML
INJECTION, SOLUTION INTRAVENOUS CONTINUOUS
Status: DISCONTINUED | OUTPATIENT
Start: 2018-03-22 | End: 2018-03-22 | Stop reason: HOSPADM

## 2018-03-22 RX ORDER — HYDROMORPHONE HYDROCHLORIDE 1 MG/ML
.3-.5 INJECTION, SOLUTION INTRAMUSCULAR; INTRAVENOUS; SUBCUTANEOUS
Status: DISCONTINUED | OUTPATIENT
Start: 2018-03-22 | End: 2018-03-25 | Stop reason: HOSPADM

## 2018-03-22 RX ORDER — FENTANYL CITRATE 50 UG/ML
25-50 INJECTION, SOLUTION INTRAMUSCULAR; INTRAVENOUS
Status: DISCONTINUED | OUTPATIENT
Start: 2018-03-22 | End: 2018-03-22 | Stop reason: HOSPADM

## 2018-03-22 RX ORDER — ACETAMINOPHEN 325 MG/1
975 TABLET ORAL EVERY 8 HOURS
Status: DISCONTINUED | OUTPATIENT
Start: 2018-03-23 | End: 2018-03-25 | Stop reason: HOSPADM

## 2018-03-22 RX ORDER — AMOXICILLIN 250 MG
2 CAPSULE ORAL 2 TIMES DAILY
Status: DISCONTINUED | OUTPATIENT
Start: 2018-03-22 | End: 2018-03-25 | Stop reason: HOSPADM

## 2018-03-22 RX ORDER — AMOXICILLIN 250 MG
1 CAPSULE ORAL 2 TIMES DAILY
Status: DISCONTINUED | OUTPATIENT
Start: 2018-03-22 | End: 2018-03-25 | Stop reason: HOSPADM

## 2018-03-22 RX ORDER — GINSENG 100 MG
CAPSULE ORAL PRN
Status: DISCONTINUED | OUTPATIENT
Start: 2018-03-22 | End: 2018-03-22 | Stop reason: HOSPADM

## 2018-03-22 RX ORDER — HYDROMORPHONE HYDROCHLORIDE 1 MG/ML
.3-.5 INJECTION, SOLUTION INTRAMUSCULAR; INTRAVENOUS; SUBCUTANEOUS EVERY 5 MIN PRN
Status: DISCONTINUED | OUTPATIENT
Start: 2018-03-22 | End: 2018-03-22 | Stop reason: HOSPADM

## 2018-03-22 RX ORDER — EPINEPHRINE 1 MG/ML
0.3 INJECTION, SOLUTION INTRAMUSCULAR; SUBCUTANEOUS
Status: DISCONTINUED | OUTPATIENT
Start: 2018-03-22 | End: 2018-03-22

## 2018-03-22 RX ORDER — BUPIVACAINE HYDROCHLORIDE 7.5 MG/ML
INJECTION, SOLUTION INTRASPINAL PRN
Status: DISCONTINUED | OUTPATIENT
Start: 2018-03-22 | End: 2018-03-22

## 2018-03-22 RX ORDER — SODIUM CHLORIDE, SODIUM LACTATE, POTASSIUM CHLORIDE, CALCIUM CHLORIDE 600; 310; 30; 20 MG/100ML; MG/100ML; MG/100ML; MG/100ML
INJECTION, SOLUTION INTRAVENOUS CONTINUOUS
Status: DISCONTINUED | OUTPATIENT
Start: 2018-03-22 | End: 2018-03-22

## 2018-03-22 RX ORDER — ONDANSETRON 2 MG/ML
4 INJECTION INTRAMUSCULAR; INTRAVENOUS EVERY 6 HOURS PRN
Status: DISCONTINUED | OUTPATIENT
Start: 2018-03-22 | End: 2018-03-25 | Stop reason: HOSPADM

## 2018-03-22 RX ORDER — NALOXONE HYDROCHLORIDE 0.4 MG/ML
.1-.4 INJECTION, SOLUTION INTRAMUSCULAR; INTRAVENOUS; SUBCUTANEOUS
Status: DISCONTINUED | OUTPATIENT
Start: 2018-03-22 | End: 2018-03-25 | Stop reason: HOSPADM

## 2018-03-22 RX ORDER — ACETAMINOPHEN 325 MG/1
650 TABLET ORAL EVERY 6 HOURS PRN
Status: DISCONTINUED | OUTPATIENT
Start: 2018-03-22 | End: 2018-03-22 | Stop reason: DRUGHIGH

## 2018-03-22 RX ORDER — AMOXICILLIN 250 MG
1 CAPSULE ORAL 2 TIMES DAILY PRN
Status: DISCONTINUED | OUTPATIENT
Start: 2018-03-22 | End: 2018-03-25 | Stop reason: HOSPADM

## 2018-03-22 RX ORDER — PROPOFOL 10 MG/ML
INJECTION, EMULSION INTRAVENOUS CONTINUOUS PRN
Status: DISCONTINUED | OUTPATIENT
Start: 2018-03-22 | End: 2018-03-22

## 2018-03-22 RX ORDER — HYDROMORPHONE HYDROCHLORIDE 2 MG/1
2-4 TABLET ORAL
Status: DISCONTINUED | OUTPATIENT
Start: 2018-03-22 | End: 2018-03-22

## 2018-03-22 RX ORDER — CEFAZOLIN SODIUM 1 G
1 VIAL (EA) INJECTION EVERY 8 HOURS
Status: DISCONTINUED | OUTPATIENT
Start: 2018-03-23 | End: 2018-03-25 | Stop reason: HOSPADM

## 2018-03-22 RX ADMIN — CEFAZOLIN SODIUM 1 G: 10 INJECTION, POWDER, FOR SOLUTION INTRAVENOUS at 13:40

## 2018-03-22 RX ADMIN — SODIUM CHLORIDE: 9 INJECTION, SOLUTION INTRAVENOUS at 18:15

## 2018-03-22 RX ADMIN — HYDROMORPHONE HYDROCHLORIDE 0.5 MG: 1 INJECTION, SOLUTION INTRAMUSCULAR; INTRAVENOUS; SUBCUTANEOUS at 16:35

## 2018-03-22 RX ADMIN — FENTANYL CITRATE 50 MCG: 50 INJECTION, SOLUTION INTRAMUSCULAR; INTRAVENOUS at 16:08

## 2018-03-22 RX ADMIN — SODIUM CHLORIDE, POTASSIUM CHLORIDE, SODIUM LACTATE AND CALCIUM CHLORIDE: 600; 310; 30; 20 INJECTION, SOLUTION INTRAVENOUS at 14:21

## 2018-03-22 RX ADMIN — BUPIVACAINE HYDROCHLORIDE IN DEXTROSE 12 MG: 7.5 INJECTION, SOLUTION SUBARACHNOID at 13:30

## 2018-03-22 RX ADMIN — ACETAMINOPHEN 1000 MG: 10 INJECTION, SOLUTION INTRAVENOUS at 16:15

## 2018-03-22 RX ADMIN — SENNOSIDES AND DOCUSATE SODIUM 1 TABLET: 8.6; 5 TABLET ORAL at 20:44

## 2018-03-22 RX ADMIN — HYDROMORPHONE HYDROCHLORIDE 0.5 MG: 1 INJECTION, SOLUTION INTRAMUSCULAR; INTRAVENOUS; SUBCUTANEOUS at 19:16

## 2018-03-22 RX ADMIN — FENTANYL CITRATE 50 MCG: 50 INJECTION, SOLUTION INTRAMUSCULAR; INTRAVENOUS at 16:04

## 2018-03-22 RX ADMIN — CYCLOBENZAPRINE HYDROCHLORIDE 10 MG: 10 TABLET, FILM COATED ORAL at 20:43

## 2018-03-22 RX ADMIN — ROPIVACAINE HYDROCHLORIDE 20 ML GIVEN: 5 INJECTION, SOLUTION EPIDURAL; INFILTRATION; PERINEURAL at 13:00

## 2018-03-22 RX ADMIN — CEFAZOLIN SODIUM 1 G: 10 INJECTION, POWDER, FOR SOLUTION INTRAVENOUS at 01:56

## 2018-03-22 RX ADMIN — MIDAZOLAM 2 MG: 1 INJECTION INTRAMUSCULAR; INTRAVENOUS at 13:06

## 2018-03-22 RX ADMIN — HYDROMORPHONE HYDROCHLORIDE 0.5 MG: 1 INJECTION, SOLUTION INTRAMUSCULAR; INTRAVENOUS; SUBCUTANEOUS at 16:09

## 2018-03-22 RX ADMIN — Medication 5 MG: at 15:10

## 2018-03-22 RX ADMIN — HYDROMORPHONE HYDROCHLORIDE 0.5 MG: 1 INJECTION, SOLUTION INTRAMUSCULAR; INTRAVENOUS; SUBCUTANEOUS at 21:40

## 2018-03-22 RX ADMIN — CEFAZOLIN SODIUM 1 G: 10 INJECTION, POWDER, FOR SOLUTION INTRAVENOUS at 10:45

## 2018-03-22 RX ADMIN — KETOROLAC TROMETHAMINE 30 MG: 30 INJECTION, SOLUTION INTRAMUSCULAR at 21:40

## 2018-03-22 RX ADMIN — Medication 5 MG: at 14:23

## 2018-03-22 RX ADMIN — ONDANSETRON 4 MG: 2 INJECTION INTRAMUSCULAR; INTRAVENOUS at 14:58

## 2018-03-22 RX ADMIN — PROPOFOL 100 MCG/KG/MIN: 10 INJECTION, EMULSION INTRAVENOUS at 13:30

## 2018-03-22 RX ADMIN — ZOLPIDEM TARTRATE 5 MG: 5 TABLET, FILM COATED ORAL at 02:04

## 2018-03-22 RX ADMIN — KETOROLAC TROMETHAMINE 15 MG: 15 INJECTION, SOLUTION INTRAMUSCULAR; INTRAVENOUS at 16:13

## 2018-03-22 RX ADMIN — SODIUM CHLORIDE, POTASSIUM CHLORIDE, SODIUM LACTATE AND CALCIUM CHLORIDE: 600; 310; 30; 20 INJECTION, SOLUTION INTRAVENOUS at 10:52

## 2018-03-22 RX ADMIN — CEFAZOLIN SODIUM 1 G: 10 INJECTION, POWDER, FOR SOLUTION INTRAVENOUS at 15:40

## 2018-03-22 ASSESSMENT — ENCOUNTER SYMPTOMS
SEIZURES: 0
DYSRHYTHMIAS: 0

## 2018-03-22 ASSESSMENT — COPD QUESTIONNAIRES: COPD: 0

## 2018-03-22 ASSESSMENT — LIFESTYLE VARIABLES: TOBACCO_USE: 0

## 2018-03-22 NOTE — ANESTHESIA PREPROCEDURE EVALUATION
Procedure: Procedure(s):  COMBINED IRRIGATION AND DEBRIDEMENT KNEE  APPLY WOUND VAC  Preop diagnosis: POSSIBLE WOUND INCISION INFECTION STATUS POST RIGHT TOTAL KNEE    Allergies   Allergen Reactions     Bees Anaphylaxis     Morphine Nausea and Vomiting     Vancomycin Rash     Past Medical History:   Diagnosis Date     S/P knee replacement      Past Surgical History:   Procedure Laterality Date     ARTHROPLASTY KNEE Left 2010     ARTHROPLASTY KNEE Right 2/26/2018    Procedure: ARTHROPLASTY KNEE;  RIGHT TOTAL KNEE ARTHROPLASTY  ;  Surgeon: Paresh Oliveira MD;  Location:  OR     ARTHROPLASTY REVISION KNEE  7/19/2014    Procedure: ARTHROPLASTY REVISION KNEE;  Surgeon: Paresh Oliveira MD;  Location:  OR     IRRIGATION AND DEBRIDEMENT KNEE, PLACE ANTIBIOTIC CEMENT BEADS / SPACE  7/19/2014    Procedure: COMBINED IRRIGATION AND DEBRIDEMENT KNEE, PLACE ANTIBIOTIC CEMENT BEADS / SPACER;  Surgeon: Paresh Oliveira MD;  Location:  OR     ORTHOPEDIC SURGERY Left     2016  left hip surgery     Prior to Admission medications    Medication Sig Start Date End Date Taking? Authorizing Provider   CEPHALEXIN PO Take 500 mg by mouth 3 times daily   Yes Unknown, Entered By History   HYDROmorphone (DILAUDID) 2 MG tablet Take 1-2 tablets (2-4 mg) by mouth every 3 hours as needed for moderate to severe pain 2/28/18  Yes Paresh Oliveira MD   aspirin  MG EC tablet Take 1 tablet (325 mg) by mouth 2 times daily (before meals) 2/28/18  Yes Paresh Oliveira MD   ondansetron (ZOFRAN-ODT) 4 MG ODT tab Take 1 tablet (4 mg) by mouth every 6 hours as needed (Nausea and Vomiting) 2/28/18  Yes Paresh Oliveira MD   senna-docusate (SENOKOT-S;PERICOLACE) 8.6-50 MG per tablet Take 1 tablet by mouth 2 times daily as needed for constipation 2/28/18  Yes Paresh Oliveira MD   cyclobenzaprine (FLEXERIL) 10 MG tablet Take 1 tablet (10 mg) by mouth 3 times daily as needed for muscle spasms 2/28/18  Yes Paresh Oliveira MD   Acetaminophen  (TYLENOL PO) Take 650 mg by mouth every 6 hours as needed for mild pain or fever   Yes Reported, Patient   AMOXICILLIN PO Take 2,000 mg by mouth daily as needed (patient takes 4 X 500 mg = 2,000 mg dose) 1 hour prior to dental appt.   Yes Reported, Patient   KRILL OIL PO Take 1 capsule by mouth every morning   Yes Reported, Patient   multivitamin, therapeutic (THERA-VIT) TABS Take 1 tablet by mouth daily   Yes Unknown, Entered By History   IBUPROFEN PO Take 800 mg by mouth daily as needed for moderate pain    Yes Reported, Patient   order for DME Equipment being ordered: CPM: Advance as tolerated. Range 0-90 degree flexion  Treatment Diagnosis  Total knee 3/22/18   Paresh Oliveira MD   EPINEPHrine (EPIPEN/ADRENACLICK/OR ANY BX GENERIC EQUIV) 0.3 MG/0.3ML injection 2-pack Inject 0.3 mLs (0.3 mg) into the muscle once as needed for anaphylaxis 2/22/18   Patricia Vargas MD     Current Facility-Administered Medications Ordered in Epic   Medication Dose Route Frequency Last Rate Last Dose     lactated ringers infusion   Intravenous Continuous 50 mL/hr at 03/22/18 1052       ceFAZolin (ANCEF) 1g in 10 mL SWFI premix for IVP  1 g Intravenous Q8H   1 g at 03/22/18 1045     oxyCODONE IR (ROXICODONE) tablet 5-10 mg  5-10 mg Oral Q3H PRN         naloxone (NARCAN) injection 0.1-0.4 mg  0.1-0.4 mg Intravenous Q2 Min PRN         zolpidem (AMBIEN) tablet 5 mg  5 mg Oral At Bedtime PRN   5 mg at 03/22/18 0204     No current [facility]-ordered outpatient prescriptions on file.     Wt Readings from Last 1 Encounters:   02/26/18 49 kg (108 lb 0.4 oz)     Temp Readings from Last 1 Encounters:   03/22/18 36.4  C (97.5  F) (Oral)     BP Readings from Last 6 Encounters:   03/22/18 119/82   03/01/18 157/87   02/22/18 129/77   01/14/15 126/85   08/27/14 122/80   07/22/14 126/74     Pulse Readings from Last 4 Encounters:   03/22/18 77   03/01/18 117   02/22/18 83   01/14/15 67     Resp Readings from Last 1 Encounters:   03/22/18 16     SpO2  Readings from Last 1 Encounters:   03/22/18 96%     Recent Labs   Lab Test  03/22/18   0615  03/21/18   1700   NA  140  139   POTASSIUM  4.0  4.2   CHLORIDE  110*  109   CO2  24  22   ANIONGAP  6  8   GLC  101*  90   BUN  26  32*   CR  0.51*  0.51*   JODI  8.7  8.8     Recent Labs   Lab Test  03/21/18   1700  03/01/18   0618  02/28/18   0621   01/12/15   1109   WBC  6.4   --    --    --   9.2   HGB  10.9*   --   10.6*   < >  13.9   PLT  465*  198   --    < >  288    < > = values in this interval not displayed.     Recent Labs   Lab Test  08/11/14   1400  08/07/14   1030   INR  1.43*  1.85*        Anesthesia Evaluation     . Pt has had prior anesthetic. Type: General    No history of anesthetic complications          ROS/MED HX    ENT/Pulmonary:      (-) tobacco use, asthma, COPD and sleep apnea   Neurologic:      (-) seizures, CVA and migraines   Cardiovascular:        (-) hypertension, CAD, HERNANDEZ, arrhythmias and dyslipidemia   METS/Exercise Tolerance:  >4 METS   Hematologic:        (-) history of blood clots and anemia   Musculoskeletal:   (+) arthritis, , , -       GI/Hepatic:        (-) GERD and liver disease   Renal/Genitourinary:      (-) renal disease and nephrolithiasis   Endo:      (-) Type I DM, Type II DM, thyroid disease and obesity   Psychiatric:         Infectious Disease:        (-) Recent Fever   Malignancy:         Other:                     Physical Exam  Normal systems: cardiovascular, pulmonary and dental    Airway   Mallampati: II  TM distance: >3 FB  Neck ROM: full    Dental     Cardiovascular   Rhythm and rate: regular and normal  (-) no murmur    Pulmonary    breath sounds clear to auscultation                    Anesthesia Plan      History & Physical Review      ASA Status:  2 .    NPO Status:  > 8 hours    Plan for Spinal and Periph. Nerve Block for postop pain   PONV prophylaxis:  Ondansetron (or other 5HT-3)  Propofol infusion      Postoperative Care  Postoperative pain management:   Multi-modal analgesia and Peripheral nerve block (Single Shot).      Consents  Anesthetic plan, risks, benefits and alternatives discussed with:  Patient..                          .

## 2018-03-22 NOTE — CONSULTS
Plastic Surgery Consult    Cc:/ delayed wound healing of right TKA     I was consulted by Dr. Paresh Oliveira regarding wound recommendations    HPI:  56 yo female who is now 3 weeks s/p right TKA who has some delayed healing of incision.  Denies fevers or malaise.  Aspiration negative for growth so far.  On Ancef.  Planning surgical debridement later today with Dr. Oliveira.    Past Medical History:   Diagnosis Date     S/P knee replacement    history of possible left knee infection, but salvaged.      Past Surgical History:   Procedure Laterality Date     ARTHROPLASTY KNEE Left 2010     ARTHROPLASTY KNEE Right 2/26/2018    Procedure: ARTHROPLASTY KNEE;  RIGHT TOTAL KNEE ARTHROPLASTY  ;  Surgeon: Paresh Oliveira MD;  Location:  OR     ARTHROPLASTY REVISION KNEE  7/19/2014    Procedure: ARTHROPLASTY REVISION KNEE;  Surgeon: Paresh Oliveira MD;  Location:  OR     IRRIGATION AND DEBRIDEMENT KNEE, PLACE ANTIBIOTIC CEMENT BEADS / SPACE  7/19/2014    Procedure: COMBINED IRRIGATION AND DEBRIDEMENT KNEE, PLACE ANTIBIOTIC CEMENT BEADS / SPACER;  Surgeon: Paresh Oliveira MD;  Location:  OR     ORTHOPEDIC SURGERY Left     2016  left hip surgery        Allergies   Allergen Reactions     Bees Anaphylaxis     Morphine Nausea and Vomiting     Vancomycin Rash     Social History     Social History     Marital status:      Spouse name: N/A     Number of children: N/A     Years of education: N/A     Occupational History     Not on file.     Social History Main Topics     Smoking status: Never Smoker     Smokeless tobacco: Never Used     Alcohol use Yes      Comment: occ     Drug use: No     Sexual activity: No     Other Topics Concern     Not on file     Social History Narrative     Current Facility-Administered Medications:      lactated ringers infusion, , Intravenous, Continuous, Paresh Oliveira MD, Last Rate: 50 mL/hr at 03/21/18 1730     ceFAZolin (ANCEF) 1g in 10 mL SWFI premix for IVP, 1 g, Intravenous, Q8H,  Paresh Oliveira MD, 1 g at 03/22/18 0156     oxyCODONE IR (ROXICODONE) tablet 5-10 mg, 5-10 mg, Oral, Q3H PRN, Paresh Oliveira MD     naloxone (NARCAN) injection 0.1-0.4 mg, 0.1-0.4 mg, Intravenous, Q2 Min PRN, Edgar Hollis, [facility]     zolpidem (AMBIEN) tablet 5 mg, 5 mg, Oral, At Bedtime PRN, Paresh Oliveira MD, 5 mg at 03/22/18 0204     Labs: blood cx (3/21)and knee fluid (3/19) NGTD  WBC 6.4, Hgb 10.9 plt 465  Albumin 3.1, Tprotein 6.3    PE: 52.9 kg.  BMI 17.4  /82  Pulse 77  Temp 97.5  F (36.4  C) (Oral)  Resp 16  SpO2 96%   Very thin female  Alert, comfortable  Vertical midline incision on knee with dry eschar, no drainage. Moderate swelling, mild erythema  Distal pulses intact  Sensation intact    Impression: 3 weeks s/p right TKA with delayed wound healing.  No evidence of deep infection at this time    Recs:  Debridement planned by Dr. Oliveira.  I will be available.  Discussed with patient that if exposed hardware, bone, or tendon, flap coverage would be indicated to salvage TKA.  Doubt local fasciocutaneous flap would be an option.  Likely would require medial gastroc muscle flap and skin graft.  Likely STSG from right thigh and wound vac application.  If no deep involvement, could consider wound vac, healing by secondary intention or possible later skin graft alone.  Discussed details, risks, benefits and alternatives. Risks include bleeding, infection, dehiscence, partial or total flap loss, partial or total skin graft loss, donor site healing problems, scarring or loss of the TKA.

## 2018-03-22 NOTE — ANESTHESIA POSTPROCEDURE EVALUATION
Patient: Kimi Ulloa    Procedure(s):  RIGHT KNEE IRRIGATION AND DEBRIDEMENT WOUND VAC APPLICATION, ROTATIONAL MEDIAL GASTROC FLAP, SPLIT THICKNESS GRAFT, REPAIR RETINACULUM, DRAIN PLACEMENT - Wound Class: II-Clean Contaminated   - Wound Class: II-Clean Contaminated    Diagnosis:POSSIBLE WOUND INCISION INFECTION STATUS POST RIGHT TOTAL KNEE  Diagnosis Additional Information: No value filed.    Anesthesia Type:  Spinal, Periph. Nerve Block for postop pain    Note:  Anesthesia Post Evaluation    Patient location during evaluation: PACU  Patient participation: Able to fully participate in evaluation  Level of consciousness: awake  Pain management: adequate  Airway patency: patent  Cardiovascular status: acceptable  Respiratory status: acceptable  Hydration status: acceptable  PONV: none     Anesthetic complications: None          Last vitals:  Vitals:    03/22/18 1630 03/22/18 1645 03/22/18 1700   BP: 132/73 129/75 135/77   Pulse:      Resp: 9 24 8   Temp: 36.7  C (98.1  F) 36.6  C (97.9  F) 36.8  C (98.2  F)   SpO2: 93% 97% 97%         Electronically Signed By: YUE CHIU MD  March 22, 2018  5:22 PM

## 2018-03-22 NOTE — BRIEF OP NOTE
[facility] Brief Operative Note    Pre-operative diagnosis:  dehiscence of  Total knee wound   Post-operative diagnosis same   Procedure: Procedure(s):  RIGHT KNEE IRRIGATION AND DEBRIDEMENT WOUND VAC APPLICATION, ROTATIONAL MEDIAL GASTROC FLAP, SPLIT THICKNESS GRAFT, REPAIR RETINACULUM, DRAIN PLACEMENT - Wound Class: II-Clean Contaminated   - Wound Class: II-Clean Contaminated   Surgeon(s): Surgeon(s) and Role:     * Paresh Oliveira MD - asst     * Bentley Duarte MD - primary   Estimated blood loss: * No values recorded between 3/22/2018  2:04 PM and 3/22/2018  3:58 PM *    Specimens:   ID Type Source Tests Collected by Time Destination   1 : right knee deep subcutaneous tissue Tissue Other ANAEROBIC BACTERIAL CULTURE, GRAM STAIN, TISSUE CULTURE AEROBIC BACTERIAL Paresh Oliveira MD 3/22/2018  2:09 PM    2 : right knee deep synovium Tissue Other ANAEROBIC BACTERIAL CULTURE, GRAM STAIN, TISSUE CULTURE AEROBIC BACTERIAL Paresh Oliveira MD 3/22/2018  2:12 PM       Findings:

## 2018-03-22 NOTE — PROGRESS NOTES
Plan     I  And  D  / wound vac  Is  First  Choice  But  Very  Probable  Will need to  Proceed  To  A  Medial  gastroc  Flap   With  Skin  Grafting    Will need  To consider  Some  parentral  Solution  To  Caloric  Needs  To heal  All this

## 2018-03-22 NOTE — PLAN OF CARE
Problem: Patient Care Overview  Goal: Plan of Care/Patient Progress Review  Outcome: No Change  A&O. CMS intact. VSS. Dressing CDI. Up independently. C/o slight discomfort, declined intervention.  Will ask to get into shower to do pre-op scrub at 0700. Plan for I&D of knee at 1300.

## 2018-03-22 NOTE — ANESTHESIA CARE TRANSFER NOTE
Patient: Kimi Ulloa    Procedure(s):  RIGHT KNEE IRRIGATION AND DEBRIDEMENT WOUND VAC APPLICATION, ROTATIONAL MEDIAL GASTROC FLAP, SPLIT THICKNESS GRAFT, REPAIR RETINACULUM, DRAIN PLACEMENT - Wound Class: II-Clean Contaminated   - Wound Class: II-Clean Contaminated    Diagnosis: POSSIBLE WOUND INCISION INFECTION STATUS POST RIGHT TOTAL KNEE  Diagnosis Additional Information: No value filed.    Anesthesia Type:   Spinal, Periph. Nerve Block for postop pain     Note:  Airway :Room Air  Patient transferred to:PACU  Handoff Report: Identifed the Patient, Identified the Reponsible Provider, Reviewed the pertinent medical history, Discussed the surgical course, Reviewed Intra-OP anesthesia mangement and issues during anesthesia, Set expectations for post-procedure period and Allowed opportunity for questions and acknowledgement of understanding      Vitals: (Last set prior to Anesthesia Care Transfer)    CRNA VITALS  3/22/2018 1527 - 3/22/2018 1608      3/22/2018             Resp Rate (set): 10                Electronically Signed By: CANDY Grajeda CRNA  March 22, 2018  4:08 PM

## 2018-03-22 NOTE — PLAN OF CARE
Problem: Patient Care Overview  Goal: Plan of Care/Patient Progress Review  Outcome: No Change  Alert and oriented x4 . Up independent in room . Minimal pain , declines pain medication . Good appetite . Iv infusing . Voiding adequate amount .

## 2018-03-22 NOTE — ANESTHESIA PROCEDURE NOTES
Peripheral nerve/Neuraxial procedure note : Adductor canal (targeting saphenous nerve)  Pre-Procedure  Performed by DARIN GALLEGO  Location: pre-op      Pre-Anesthestic Checklist: patient identified, IV checked, site marked, risks and benefits discussed, informed consent, monitors and equipment checked, pre-op evaluation, at physician/surgeon's request and post-op pain management    Timeout  Correct Patient: Yes   Correct Procedure: Yes   Correct Site: Yes   Correct Laterality: Yes   Correct Position: Yes   Site Marked: Yes   .   Procedure Documentation    .    Procedure:  right  Adductor canal (targeting saphenous nerve).  Local skin infiltrated with 1 mL of 1% lidocaine.     Ultrasound used to identify targeted nerve, plexus, or vascular marker and placed a needle adjacent to it., Ultrasound was used to visualize the spread of the anesthetic in close proximity to the above stated nerve. A permanent image is entered into the patient's record.  Patient Prep;chlorhexidine gluconate and isopropyl alcohol.  .  Needle: insulated Needle Gauge: 21.  Needle Length (millimeters) 100  Insertion Method: Single Shot.       Assessment/Narrative  Paresthesias: No.  .  The placement was negative for: blood aspirated, painful injection and site bleeding.  Bolus given via needle..   Secured via.   Complications: none. Comments:  Bolus via needle, 20 ml of 0.5% Ropivacaine with 1:400,000 epinephrine.  Patient tolerated well, was mildly sedated but communicative throughout the procedure.   The surgeon has given a verbal order transferring care of this patient to me for the performance of regional analgesia block for post op pain control. It is requested of me because I am uniquely trained and qualified to perform this block and the surgeon is neither trained nor qualified to perform this procedure.

## 2018-03-22 NOTE — CONSULTS
Consult Date:  03/21/2018      DATE OF ADMISSION: 03/21/2018      HISTORY OF PRESENT ILLNESS:  Nomi is about three weeks post total knee arthroplasty.  She had some ischemia along the wound edges and breakdown.  I aspirated the knee Monday the 19th, cultures are negative to this point.      The concern is how this is going to heal or not heal.  No signs of infection and no further trauma.      In my opinion, based on workup and observation and the time of the original surgery, she has 0 subcutaneous fat. There is certainly a significant nutritional issue here.  This will have to be addressed later.  There is nothing we can do now that would speed this up.        I have asked Dr. Ranjan Duarte from Plastic to see Ms. Pa regarding the possibility of a wound VAC or a medial gastrocnemius flap.      At this point in time, she is in today.  She is comfortable and starting IV antibiotics with Ancef.  She has had a red man syndrome to vancomycin so we have to avoid that and can start anything else.  Cultures are negative now, but she was on oral antibiotics so the cultures could be a false negative.      PLAN:  Ancef, wound dressing changes tomorrow, scheduled for surgery 1:00 p.m.  At the minimum will be debridement and wound VAC placement using Silver and go from there.        Labs were done.      Previous workup including her H and P done at her primary care clinic.  The H and P was from Dr. Vargas dated 02/22/2018, [facility].       At this point in time, plan surgical intervention.  Wound VAC will be used.  There is a chance this could require long-term IV antibiotics, so I did draw blood cultures.      I answered her questions.  Her , Dr. Ramírez Pa, was here today. As well, they are well aware of the plans, and risk/benefit ratios.         ESTEFANIA LUNSFORD MD             D: 03/21/2018   T: 03/21/2018   MT: MD      Name:     NOMI PA   MRN:      [id number]        Account:        [id number]   :      1960           Consult Date:  2018      Document: [id number]

## 2018-03-22 NOTE — OP NOTE
Date of Service: 03/22/18    PREOPERATIVE DIAGNOSES:      Wound dehiscence complicating right total knee arthroplasty    POSTOPERATIVE DIAGNOSES:    same    PROCEDURES:     Debridement of superficial skin necrosis.    Deep irrigation and debridement of joint space    Right medial gastrocnemius muscle flap    Split-thickness skin graft from the right lateral thigh to the right gastroc muscle flap, 50 cm     SURGEON: Bentley Duarte MD   CO-SURGEON: Paresh Oliveira M.D.    ANESTHESIA: General endotracheal anesthesia    COMPLICATIONS: None.     ESTIMATED BLOOD LOSS: 50 cc    DISPOSITION: To the Post Anesthesia Care Unit in stable condition.     TUBES AND DRAINS: candi x 1    COUNTS: Correct     SPECIMENS: deep culture - gram stain negative    INDICATIONS:      This is a 57-year-old female who is about three weeks out from a total knee arthroplasty on the right.  Her postoperative course has been complicated by delayed wound healing.  She had an aspiration earlier in the week which did not have any growth, but the wound did not look sustainable.  We discussed options.  Debridement was recommended along with possible medial gastrocnemius muscle flap, split-thickness skin graft, and wound VAC application.  Details, risks, benefits, and alternatives were discussed.  Limitations and risks include bleeding, hematoma, seroma, numbness, scarring, weakness of plantar flexion, persistent infection, loss of the total knee arthroplasty, or the need for amputation.  She voiced understanding, signed the consent, and elected to proceed.      DESCRIPTION OF PROCEDURE:      She was marked preoperatively.   She was taken to the operating room and placed supine.  Pressure points were padded and sequential compression devices were placed.  She was given IV sedation and a spinal anesthetic.  LMA anesthesia was used.  She was prepped in the standard fashion and a timeout was performed to ensure the correct orientation and  procedure.    We began by marking out the necrotic tissue.  The leg was elevated and a sterile tourniquet was placed and inflated to 250 mmHg.  The markings were incised and the necrotic tissue is removed.  Some of the deep capsule had dehisced and we did encounter some fluid.  This was sent for culture.  Some of the tissue was also debrided with a rongeur and sent for culture.  The capsule was opened by removing Ethibond sutures.  Pulse lavage of the joint space was then conducted.  The capsule was then closed with figure-of-eight #1 Ethibond.  She had very tenuous soft tissue coverage and we made the decision to proceed with flap coverage.    An incision was made 2 cm medial to the medial edge of the tibia.  Care was taken to avoid the greater saphenous vein and saphenous nerve.  Elevation was performed over the superficial aspect of the medial head of the gastroc muscle.  This was conducted down to the Achilles tendon.  The lesser saphenous vein and sural nerve were preserved laterally.  The medial head of the gastrocnemius was  from the soleus muscle.  I then divided up the median raphae to divide the two heads of the gastroc muscle.  The lateral head was kept in place.  A tunnel total was made between the donor site and the wound.  Care was taken to divide the fascia and avoid any tethering across the tunnel.  The flap was then passed through the tunnel and into the wound.  The flap had been elevated up to the level of the medial sural artery.  The wound edges were elevated so that the muscle could be tucked underneath these wound edges.  The muscle was inset so the deep aspect of the muscle now faced anteriorly.  I divided the epimysium with a 15 blade to provide release and a broader coverage of the muscle in the wound bed.  The flap was sutured under the elevated skin edges with interrupted 3-0 Prolene in a vertical mattress fashion.  This provided nice coverage of the wound.  Only the superior  aspect of the wound was closed primarily with interrupted, deep dermal 2-0 PDS and staples.      The tourniquet was then deflated after 64 minutes.  There was good perfusion the leg, but the muscle did not perfuse adequately immediately.  I then released more tethering fascial attachments in the tunnel and this improve the perfusion of the flap.  There was bright red bleeding and a good color of the muscle flap at this point in time.  The donor site was closed over a 15-Welsh round channel drain that was brought out inferiorly and secured with a 2-0 silk.  This donor site was closed with deep dermal 2-0 Vicryl and staples.    A 10 x 5 cm split-thickness skin graft was then harvested from the right lateral thigh.  This was done with a dermatome set on 12/1000 of an inch.  The skin graft was then meshed 1:1.5 and stapled in place with the dermis side down over the muscle flap.    Bacitracin, Xeroform, and a silver sponge were placed over the skin graft to secure it as a bolster.  Xeroform was applied over the remaining donor site.  A large Tegaderm was applied over the skin graft donor site after injecting 1/4% Marcaine with epinephrine into the dermis of the donor site.  A large cotton dressing was applied and a knee immobilizer was placed.  The patient was awoken from sedation and taken to the PACU in stable condition.

## 2018-03-22 NOTE — PLAN OF CARE
Problem: Patient Care Overview  Goal: Plan of Care/Patient Progress Review  Outcome: Improving  Arrived at 1730. VSS, CMS intact. Pain well controlled with dilaudid, Toradol and tylenol. Wound vac to 120mm. Crowell and DANIEL drain. Alert and orient x 4. Will continue to monitor.

## 2018-03-22 NOTE — ANESTHESIA PROCEDURE NOTES
Peripheral nerve/Neuraxial procedure note : intrathecal  Pre-Procedure  Performed by DARIN GALLEGO  Location: OB      Pre-Anesthestic Checklist: patient identified, IV checked, site marked, risks and benefits discussed, informed consent, monitors and equipment checked, pre-op evaluation and at physician/surgeon's request    Timeout  Correct Patient: Yes   Correct Procedure: Yes   Correct Site: Yes   Correct Laterality: Yes   Correct Position: Yes   Site Marked: Yes   .   Procedure Documentation    .    Procedure:    Intrathecal.  Insertion Site:L2-3  (midline approach)      Patient Prep;povidone-iodine 7.5% surgical scrub.  .  Needle: Sagrario tip Spinal Needle (gauge): 25  Spinal/LP Needle Length (inches): 3 # of attempts: 1 and # of redirects:  Introducer used Introducer: 20 G .       Assessment/Narrative  Paresthesias: No.  .  .  clear CSF fluid removed while sitting   . Comments:  Patient sitting on edge of OR bed, lower back cleaned and prepped in sterile fashion with betadine. 1% lido used to numb area. Introducer placed, spinal needle through introducer. Appropriate flow of CSF and confirmed with aspiration via syringe. Spinal dose given, 12 mg 0.75% bupivacaine. No complications.

## 2018-03-23 ENCOUNTER — APPOINTMENT (OUTPATIENT)
Dept: PHYSICAL THERAPY | Facility: CLINIC | Age: 58
DRG: 905 | End: 2018-03-23
Attending: ORTHOPAEDIC SURGERY
Payer: COMMERCIAL

## 2018-03-23 LAB
ERYTHROCYTE [DISTWIDTH] IN BLOOD BY AUTOMATED COUNT: 14.1 % (ref 10–15)
GLUCOSE BLDC GLUCOMTR-MCNC: 102 MG/DL (ref 70–99)
HCT VFR BLD AUTO: 30.4 % (ref 35–47)
HGB BLD-MCNC: 9.8 G/DL (ref 11.7–15.7)
MCH RBC QN AUTO: 32.2 PG (ref 26.5–33)
MCHC RBC AUTO-ENTMCNC: 32.2 G/DL (ref 31.5–36.5)
MCV RBC AUTO: 100 FL (ref 78–100)
PLATELET # BLD AUTO: 282 10E9/L (ref 150–450)
RBC # BLD AUTO: 3.04 10E12/L (ref 3.8–5.2)
WBC # BLD AUTO: 5.9 10E9/L (ref 4–11)

## 2018-03-23 PROCEDURE — 27210995 ZZH RX 272: Performed by: ORTHOPAEDIC SURGERY

## 2018-03-23 PROCEDURE — 97110 THERAPEUTIC EXERCISES: CPT | Mod: GP | Performed by: PHYSICAL THERAPIST

## 2018-03-23 PROCEDURE — 12000007 ZZH R&B INTERMEDIATE

## 2018-03-23 PROCEDURE — 25000132 ZZH RX MED GY IP 250 OP 250 PS 637: Performed by: ORTHOPAEDIC SURGERY

## 2018-03-23 PROCEDURE — 00000146 ZZHCL STATISTIC GLUCOSE BY METER IP

## 2018-03-23 PROCEDURE — 36415 COLL VENOUS BLD VENIPUNCTURE: CPT | Performed by: ORTHOPAEDIC SURGERY

## 2018-03-23 PROCEDURE — 97530 THERAPEUTIC ACTIVITIES: CPT | Mod: GP | Performed by: PHYSICAL THERAPIST

## 2018-03-23 PROCEDURE — E2402 NEG PRESS WOUND THERAPY PUMP: HCPCS

## 2018-03-23 PROCEDURE — 25000128 H RX IP 250 OP 636: Performed by: ORTHOPAEDIC SURGERY

## 2018-03-23 PROCEDURE — 85027 COMPLETE CBC AUTOMATED: CPT | Performed by: ORTHOPAEDIC SURGERY

## 2018-03-23 PROCEDURE — 40000193 ZZH STATISTIC PT WARD VISIT: Performed by: PHYSICAL THERAPIST

## 2018-03-23 PROCEDURE — 97161 PT EVAL LOW COMPLEX 20 MIN: CPT | Mod: GP | Performed by: PHYSICAL THERAPIST

## 2018-03-23 RX ORDER — HEPARIN SODIUM,PORCINE 10 UNIT/ML
2-5 VIAL (ML) INTRAVENOUS
Status: DISCONTINUED | OUTPATIENT
Start: 2018-03-23 | End: 2018-03-25 | Stop reason: HOSPADM

## 2018-03-23 RX ORDER — LIDOCAINE 40 MG/G
CREAM TOPICAL
Status: DISCONTINUED | OUTPATIENT
Start: 2018-03-23 | End: 2018-03-25 | Stop reason: HOSPADM

## 2018-03-23 RX ADMIN — KETOROLAC TROMETHAMINE 30 MG: 30 INJECTION, SOLUTION INTRAMUSCULAR at 09:28

## 2018-03-23 RX ADMIN — HYDROMORPHONE HYDROCHLORIDE 4 MG: 2 TABLET ORAL at 14:36

## 2018-03-23 RX ADMIN — SENNOSIDES AND DOCUSATE SODIUM 2 TABLET: 8.6; 5 TABLET ORAL at 20:48

## 2018-03-23 RX ADMIN — HYDROMORPHONE HYDROCHLORIDE 4 MG: 2 TABLET ORAL at 07:03

## 2018-03-23 RX ADMIN — KETOROLAC TROMETHAMINE 30 MG: 30 INJECTION, SOLUTION INTRAMUSCULAR at 03:45

## 2018-03-23 RX ADMIN — CEFAZOLIN SODIUM 1 G: 10 INJECTION, POWDER, FOR SOLUTION INTRAVENOUS at 16:31

## 2018-03-23 RX ADMIN — SENNOSIDES AND DOCUSATE SODIUM 2 TABLET: 8.6; 5 TABLET ORAL at 08:00

## 2018-03-23 RX ADMIN — SODIUM CHLORIDE: 9 INJECTION, SOLUTION INTRAVENOUS at 03:49

## 2018-03-23 RX ADMIN — ACETAMINOPHEN 975 MG: 325 TABLET, FILM COATED ORAL at 16:31

## 2018-03-23 RX ADMIN — HYDROMORPHONE HYDROCHLORIDE 4 MG: 2 TABLET ORAL at 17:47

## 2018-03-23 RX ADMIN — HYDROMORPHONE HYDROCHLORIDE 4 MG: 2 TABLET ORAL at 03:44

## 2018-03-23 RX ADMIN — HYDROMORPHONE HYDROCHLORIDE 4 MG: 2 TABLET ORAL at 20:48

## 2018-03-23 RX ADMIN — ACETAMINOPHEN 975 MG: 325 TABLET, FILM COATED ORAL at 00:46

## 2018-03-23 RX ADMIN — KETOROLAC TROMETHAMINE 30 MG: 30 INJECTION, SOLUTION INTRAMUSCULAR at 16:31

## 2018-03-23 RX ADMIN — HYDROMORPHONE HYDROCHLORIDE 2 MG: 2 TABLET ORAL at 00:46

## 2018-03-23 RX ADMIN — CEFAZOLIN SODIUM 1 G: 10 INJECTION, POWDER, FOR SOLUTION INTRAVENOUS at 08:00

## 2018-03-23 RX ADMIN — CEFAZOLIN SODIUM 1 G: 10 INJECTION, POWDER, FOR SOLUTION INTRAVENOUS at 00:47

## 2018-03-23 RX ADMIN — ACETAMINOPHEN 975 MG: 325 TABLET, FILM COATED ORAL at 08:00

## 2018-03-23 RX ADMIN — HYDROMORPHONE HYDROCHLORIDE 4 MG: 2 TABLET ORAL at 11:10

## 2018-03-23 NOTE — PLAN OF CARE
Problem: Patient Care Overview  Goal: Plan of Care/Patient Progress Review  PT: Orders received, eval completed, treatment initiated.  Pt admitted for I&D of wound from TKA 3weeks ago.  Pt lives in house with spouse, x2 steps to enter, can stay on main level.  Reports just PTA was not using FWW/SEC and IND with ADLs.    Discharge Planner PT   Patient plan for discharge: Home  Current status: Educated on precautions (no ROM, KI on at all times, no APs 2/2 gastroc grafting); min A with RLE for supine > sit, SBA sit <> stand with FWW, amb x30' with FWW and CGA, performs NWB on RLE  Barriers to return to prior living situation: steps- will address in therapy  Recommendations for discharge: Home with assist from spouse on stairs for safety  Rationale for recommendations: Anticipate pt will demonstrate adequate mobility for safe discharge home pending ability to perform x2 steps within precautions       Entered by: Love Brody 03/23/2018 9:00 AM

## 2018-03-23 NOTE — PLAN OF CARE
Problem: Patient Care Overview  Goal: Plan of Care/Patient Progress Review  Outcome: No Change  Pt A/O x4. Up w/ 1 assist GB, walker. Tolerating reg diet, some c/o nausea, declines zofran. Crowell patent and d/t be removed. Jacob drain output bright bloody/drainage. Incision dressing c/d/i. Knee immobilizer on at all times. CMS+ ex. some numbness in R. Foot. Pain controlled w/ oral dilaudid and Toradol. Wound vac on continuous suction@100. Will continue to monitor.

## 2018-03-23 NOTE — PLAN OF CARE
Problem: Patient Care Overview  Goal: Plan of Care/Patient Progress Review  OT:   Discharge Planner OT   Patient plan for discharge: see chart   Current status: Per chart and discussion with treatment team, including physical therapist: pt ambulates x30' with FWW and CGA. TKA x3wks prior. Discussed with pt, pt reported confidence in completing I/ADLs and did not feel she has any OT needs, as she had OT prior. No IP OT needs at this time and no OT evaluation warranted. Will complete orders.   Barriers to return to prior living situation: see chart   Recommendations for discharge: Defer to physical therapist for discharge recommendations. PT recommending:  Home with assist from spouse on stairs for safety  Rationale for recommendations: NA       Entered by: Juan Antonio Mullen 03/23/2018 12:46 PM

## 2018-03-23 NOTE — PROGRESS NOTES
Kimi Ulloa  1  POD #1    Doing well.  No excessive bleeding  Pain well-controlled.  Temperatures:  Current - Temp: 98.7  F (37.1  C); Max - Temp  Av  F (36.7  C)  Min: 97.8  F (36.6  C)  Max: 98.7  F (37.1  C)  Pulse range: Pulse  Av.8  Min: 72  Max: 79  Blood pressure range: Systolic (24hrs), Av , Min:116 , Max:150   ; Diastolic (24hrs), Av, Min:67, Max:86    CMS: intact  A s able  To examine    Labs:   Results for orders placed or performed during the hospital encounter of 18 (from the past 24 hour(s))   CBC with platelets   Result Value Ref Range    WBC 5.9 4.0 - 11.0 10e9/L    RBC Count 3.04 (L) 3.8 - 5.2 10e12/L    Hemoglobin 9.8 (L) 11.7 - 15.7 g/dL    Hematocrit 30.4 (L) 35.0 - 47.0 %     78 - 100 fl    MCH 32.2 26.5 - 33.0 pg    MCHC 32.2 31.5 - 36.5 g/dL    RDW 14.1 10.0 - 15.0 %    Platelet Count 282 150 - 450 10e9/L   Glucose by meter   Result Value Ref Range    Glucose 102 (H) 70 - 99 mg/dL       PLAN  Discharge plan: home  When   Ready    Pre op  cx  Neg  So  Far  ,  Intra op  Too early   but  A small area was open  And  draining  Into  The  Sub  Q.  So  Into  A  Less clean area  With  The open  Eschar  And  Wound    With the  Complex  Reconstruction  It would  Be  Difficult  To have  To go back in  And wash  Out    Discussed  With dr Sarbjit Calloway  Unless cx  Say  Differently  In next  Few  Days.     willl pull drain out  Sat  Has  A  Fairly  Large  Dead  Space in  Calf.   will place picc sat

## 2018-03-23 NOTE — PROGRESS NOTES
03/23/18 0846   Quick Adds   Type of Visit Initial PT Evaluation   Living Environment   Lives With spouse   Living Arrangements house   Home Accessibility bed not on first floor;stairs to enter home;stairs within home   Number of Stairs to Enter Home 2   Number of Stairs Within Home 12   Stair Railings at Home inside, present on right side   Transportation Available family or friend will provide   Living Environment Comment Bedroom up flight of stairs but reports can stay on main level   Self-Care   Usual Activity Tolerance good   Current Activity Tolerance moderate   Equipment Currently Used at Home none  (was not using FWW or SEC just prior to this admission)   Functional Level Prior   Ambulation 0-->independent   Transferring 0-->independent   Toileting 0-->independent   Bathing 0-->independent   Dressing 0-->independent   Eating 0-->independent   Communication 0-->understands/communicates without difficulty   Swallowing 0-->swallows foods/liquids without difficulty   Cognition 0 - no cognition issues reported   Fall history within last six months no   Which of the above functional risks had a recent onset or change? ambulation;transferring   Prior Functional Level Comment Since TKA x3wks prior, pt was IND with mobility and ADLs, reports Dr. Oliveira ordered to stop OPPT d/t wound opening   General Information   Onset of Illness/Injury or Date of Surgery - Date 03/22/18   Referring Physician Paresh Oliveira MD   Patient/Family Goals Statement to go home   Pertinent History of Current Problem (include personal factors and/or comorbidities that impact the POC) 56 yo female who is now 3 weeks s/p right TKA who has some delayed healing of incision, POD #1 I&D with gastroc flap   Precautions/Limitations fall precautions   Weight-Bearing Status - RLE toe touch weight-bearing  (foot flat)   General Info Comments Activity: up ad bc   Cognitive Status Examination   Orientation orientation to person, place and time  "  Level of Consciousness alert   Follows Commands and Answers Questions 100% of the time   Personal Safety and Judgment intact   Memory intact   Pain Assessment   Patient Currently in Pain (rates pain 2-3/10 at rest)   Integumentary/Edema   Integumentary/Edema Comments ACE bandage and KI donned on RLE with DANIEL drain   Range of Motion (ROM)   ROM Comment No ROM allowed R knee, LLE appeared WFL   Strength   Strength Comments decreased strength noted in RLE, difficulty with SLR and bringing RLE out of bed   Bed Mobility   Bed Mobility Comments min A with RLE for supine > sit with HOB slightly elevated   Transfer Skills   Transfer Comments SBA sit <> stand with FWW   Gait   Gait Comments CGA with FWW, performs in NWB, decreased gait speed   Balance   Balance Comments Good, no LOB noted throughout   Sensory Examination   Sensory Perception Comments denies N/T   General Therapy Interventions   Planned Therapy Interventions balance training;bed mobility training;gait training;ROM;strengthening;stretching;transfer training;risk factor education;home program guidelines;progressive activity/exercise   Clinical Impression   Criteria for Skilled Therapeutic Intervention yes, treatment indicated   PT Diagnosis decreased functional mobility   Influenced by the following impairments post-op precautions, weakness   Functional limitations due to impairments bed mobility, transfers, ambulation, stair climbing   Clinical Presentation Stable/Uncomplicated   Clinical Presentation Rationale per clinical judgement   Clinical Decision Making (Complexity) Low complexity   Therapy Frequency` daily   Predicted Duration of Therapy Intervention (days/wks) 3 days   Anticipated Discharge Disposition Home with Assist   Risk & Benefits of therapy have been explained Yes   Patient, Family & other staff in agreement with plan of care Yes   [facility] AM-PAC TM \"6 Clicks\"   2016, Trustees of [facility], under license to [employer].  All " "rights reserved.   6 Clicks Short Forms Basic Mobility Inpatient Short Form   [facility] AM-PAC  \"6 Clicks\" V.2 Basic Mobility Inpatient Short Form   1. Turning from your back to your side while in a flat bed without using bedrails? 4 - None   2. Moving from lying on your back to sitting on the side of a flat bed without using bedrails? 3 - A Little   3. Moving to and from a bed to a chair (including a wheelchair)? 3 - A Little   4. Standing up from a chair using your arms (e.g., wheelchair, or bedside chair)? 3 - A Little   5. To walk in hospital room? 3 - A Little   6. Climbing 3-5 steps with a railing? 3 - A Little   Basic Mobility Raw Score (Score out of 24.Lower scores equate to lower levels of function) 19   Total Evaluation Time   Total Evaluation Time (Minutes) 9     "

## 2018-03-23 NOTE — PLAN OF CARE
Problem: Patient Care Overview  Goal: Plan of Care/Patient Progress Review  Outcome: Improving  Pt remains A/O. Up with A1 walker GB, TTWB, immobilizer on at all times. IV SL. Pain controlled with dilaudid. Wound vac patent. wilfrido drain. Refusing capnography. Continue to monitor.

## 2018-03-23 NOTE — PROGRESS NOTES
Kimi Ulloa  1  POD # 1 Revision of Right total knee Arthroplasty incison    Doing well.  No immediate surgical complications identified.  Pain well-controlled.  Temperatures:  Current - Temp: 97.9  F (36.6  C); Max - Temp  Av  F (36.7  C)  Min: 97.8  F (36.6  C)  Max: 98.2  F (36.8  C)  Pulse range: Pulse  Av.9  Min: 72  Max: 81  Blood pressure range: Systolic (24hrs), Av , Min:116 , Max:160   ; Diastolic (24hrs), Av, Min:67, Max:88    CMS: intact Right LE  Labs:   Results for orders placed or performed during the hospital encounter of 18 (from the past 24 hour(s))   Anaerobic bacterial culture   Result Value Ref Range    Specimen Description Right Knee DEEP SUBCUTANEOUS Tissue SPECIMEN 1     Special Requests Received in anaerobic tubes.     Culture Micro Culture negative monitoring continues    Gram stain   Result Value Ref Range    Specimen Description Right Knee DEEP SUBCUTANEOUS Tissue SPECIMEN 1     Gram Stain No organisms seen     Gram Stain Few  WBC'S seen  predominantly PMN's       Gram Stain       Preliminary Gram stain report called to and read back by  DR DELGADO OR 40 @ 1542       Gram Stain       Gram stain review consistent with reported results.  Gram stain slide reviewed at the Infectious Diseases Diagnostic Laboratory - [facility]     Anaerobic bacterial culture   Result Value Ref Range    Specimen Description Right Knee DEEP SYNOVIUM Tissue SPECIMEN 2     Special Requests Received in anaerobic tubes.     Culture Micro Culture negative monitoring continues    Gram stain   Result Value Ref Range    Specimen Description Right Knee DEEP SYNOVIUM Tissue SPECIMEN 2     Gram Stain No organisms seen     Gram Stain Few  WBC'S seen  predominantly PMN's       Gram Stain       Preliminary Gram stain report called to and read back by  DR. ALEX MAC @ 1542       Gram Stain       Gram stain review consistent with reported results.  Gram stain slide reviewed at the Infectious  Diseases Diagnostic Laboratory - [facility]     CBC with platelets   Result Value Ref Range    WBC 5.9 4.0 - 11.0 10e9/L    RBC Count 3.04 (L) 3.8 - 5.2 10e12/L    Hemoglobin 9.8 (L) 11.7 - 15.7 g/dL    Hematocrit 30.4 (L) 35.0 - 47.0 %     78 - 100 fl    MCH 32.2 26.5 - 33.0 pg    MCHC 32.2 31.5 - 36.5 g/dL    RDW 14.1 10.0 - 15.0 %    Platelet Count 282 150 - 450 10e9/L   Glucose by meter   Result Value Ref Range    Glucose 102 (H) 70 - 99 mg/dL       PLAN: Remain in-house through weekend

## 2018-03-24 ENCOUNTER — APPOINTMENT (OUTPATIENT)
Dept: PHYSICAL THERAPY | Facility: CLINIC | Age: 58
DRG: 905 | End: 2018-03-24
Attending: ORTHOPAEDIC SURGERY
Payer: COMMERCIAL

## 2018-03-24 LAB
BACTERIA SPEC CULT: NO GROWTH
ERYTHROCYTE [DISTWIDTH] IN BLOOD BY AUTOMATED COUNT: 13.6 % (ref 10–15)
ERYTHROCYTE [SEDIMENTATION RATE] IN BLOOD BY WESTERGREN METHOD: 41 MM/H (ref 0–30)
GLUCOSE SERPL-MCNC: 99 MG/DL (ref 70–99)
HCT VFR BLD AUTO: 31.3 % (ref 35–47)
HGB BLD-MCNC: 10.1 G/DL (ref 11.7–15.7)
Lab: NORMAL
MCH RBC QN AUTO: 32.2 PG (ref 26.5–33)
MCHC RBC AUTO-ENTMCNC: 32.3 G/DL (ref 31.5–36.5)
MCV RBC AUTO: 100 FL (ref 78–100)
PLATELET # BLD AUTO: 298 10E9/L (ref 150–450)
RBC # BLD AUTO: 3.14 10E12/L (ref 3.8–5.2)
SPECIMEN SOURCE: NORMAL
WBC # BLD AUTO: 7.8 10E9/L (ref 4–11)

## 2018-03-24 PROCEDURE — 27210995 ZZH RX 272: Performed by: ORTHOPAEDIC SURGERY

## 2018-03-24 PROCEDURE — 25000132 ZZH RX MED GY IP 250 OP 250 PS 637: Performed by: ORTHOPAEDIC SURGERY

## 2018-03-24 PROCEDURE — 12000007 ZZH R&B INTERMEDIATE

## 2018-03-24 PROCEDURE — 85652 RBC SED RATE AUTOMATED: CPT | Performed by: ORTHOPAEDIC SURGERY

## 2018-03-24 PROCEDURE — 85027 COMPLETE CBC AUTOMATED: CPT | Performed by: ORTHOPAEDIC SURGERY

## 2018-03-24 PROCEDURE — 36415 COLL VENOUS BLD VENIPUNCTURE: CPT | Performed by: PLASTIC SURGERY

## 2018-03-24 PROCEDURE — 36569 INSJ PICC 5 YR+ W/O IMAGING: CPT

## 2018-03-24 PROCEDURE — 27210218 ZZH KIT SHRLOCK 4FR SNGL LUM PICC

## 2018-03-24 PROCEDURE — 40000193 ZZH STATISTIC PT WARD VISIT

## 2018-03-24 PROCEDURE — 25000125 ZZHC RX 250: Performed by: ORTHOPAEDIC SURGERY

## 2018-03-24 PROCEDURE — 97530 THERAPEUTIC ACTIVITIES: CPT | Mod: GP

## 2018-03-24 PROCEDURE — 25000128 H RX IP 250 OP 636: Performed by: ORTHOPAEDIC SURGERY

## 2018-03-24 PROCEDURE — 82947 ASSAY GLUCOSE BLOOD QUANT: CPT | Performed by: PLASTIC SURGERY

## 2018-03-24 RX ORDER — CEFAZOLIN SODIUM 1 G
1 VIAL (EA) INJECTION EVERY 8 HOURS
Qty: 810 ML | Refills: 0 | Status: SHIPPED | OUTPATIENT
Start: 2018-03-25 | End: 2018-04-21

## 2018-03-24 RX ORDER — HYDROMORPHONE HYDROCHLORIDE 2 MG/1
2 TABLET ORAL EVERY 6 HOURS PRN
Qty: 60 TABLET | Refills: 0 | Status: SHIPPED | OUTPATIENT
Start: 2018-03-24 | End: 2021-04-29

## 2018-03-24 RX ADMIN — HYDROMORPHONE HYDROCHLORIDE 4 MG: 2 TABLET ORAL at 00:07

## 2018-03-24 RX ADMIN — HYDROMORPHONE HYDROCHLORIDE 4 MG: 2 TABLET ORAL at 04:40

## 2018-03-24 RX ADMIN — ACETAMINOPHEN 975 MG: 325 TABLET, FILM COATED ORAL at 16:48

## 2018-03-24 RX ADMIN — ACETAMINOPHEN 975 MG: 325 TABLET, FILM COATED ORAL at 23:42

## 2018-03-24 RX ADMIN — HYDROMORPHONE HYDROCHLORIDE 4 MG: 2 TABLET ORAL at 08:03

## 2018-03-24 RX ADMIN — LIDOCAINE HYDROCHLORIDE 4 ML: 10 INJECTION, SOLUTION EPIDURAL; INFILTRATION; INTRACAUDAL; PERINEURAL at 11:38

## 2018-03-24 RX ADMIN — HYDROMORPHONE HYDROCHLORIDE 4 MG: 2 TABLET ORAL at 14:22

## 2018-03-24 RX ADMIN — CEFAZOLIN SODIUM 1 G: 10 INJECTION, POWDER, FOR SOLUTION INTRAVENOUS at 08:02

## 2018-03-24 RX ADMIN — CEFAZOLIN SODIUM 1 G: 10 INJECTION, POWDER, FOR SOLUTION INTRAVENOUS at 00:06

## 2018-03-24 RX ADMIN — HYDROMORPHONE HYDROCHLORIDE 4 MG: 2 TABLET ORAL at 17:22

## 2018-03-24 RX ADMIN — SENNOSIDES AND DOCUSATE SODIUM 2 TABLET: 8.6; 5 TABLET ORAL at 20:23

## 2018-03-24 RX ADMIN — CEFAZOLIN SODIUM 1 G: 10 INJECTION, POWDER, FOR SOLUTION INTRAVENOUS at 16:47

## 2018-03-24 RX ADMIN — HYDROMORPHONE HYDROCHLORIDE 4 MG: 2 TABLET ORAL at 23:42

## 2018-03-24 RX ADMIN — ACETAMINOPHEN 975 MG: 325 TABLET, FILM COATED ORAL at 08:03

## 2018-03-24 RX ADMIN — SENNOSIDES AND DOCUSATE SODIUM 2 TABLET: 8.6; 5 TABLET ORAL at 08:03

## 2018-03-24 RX ADMIN — ACETAMINOPHEN 975 MG: 325 TABLET, FILM COATED ORAL at 00:06

## 2018-03-24 RX ADMIN — HYDROMORPHONE HYDROCHLORIDE 4 MG: 2 TABLET ORAL at 20:21

## 2018-03-24 RX ADMIN — HYDROMORPHONE HYDROCHLORIDE 4 MG: 2 TABLET ORAL at 11:09

## 2018-03-24 NOTE — PLAN OF CARE
"Problem: Patient Care Overview  Goal: Plan of Care/Patient Progress Review  Discharge Planner PT   Patient plan for discharge: Home with spouse  Current status: Pt received supine in bed, MD team just finishing a bandage change. Provided education to patient regarding her precautions, including TTWB/footflat status, KI on at all times, no knee ROM and limited gastroc mobility. Pt provided with education regarding discharge recommendations and energy conservation techniques. Reiterated importance of participation in therapy, however pt declines any OOB activity at this time, reporting that she \"has had a busy morning\". Pt requests for PT to return tomorrow and that she will mobilize to chair with RN staff later today when she is ready.  Barriers to return to prior living situation: Precautions, steps to enter home  Recommendations for discharge: Home with spouse  Rationale for recommendations: Anticipate with further medical management and therapy, pt will be safe to discharge home with family assist as needed.       Entered by: Angie Emanuel 03/24/2018 10:19 AM          "

## 2018-03-24 NOTE — PROGRESS NOTES
Plastic Surgery    Sore as expected.  Wound vac removed.  Flap fully viable, skin graft nicely adherent.    DANIEL output serosanguinous but output a bit higher this morning.  Will leave in until tomorrow.    Likely home tomorrow, f/u with Dr. Duarte in about 10 days.

## 2018-03-24 NOTE — PLAN OF CARE
Problem: Patient Care Overview  Goal: Plan of Care/Patient Progress Review  Outcome: No Change  Reports pain at flap site. Good pain control with PO Dilaudid. DANIEL drain with small amount of sanguinous output. Lateral thigh graft site with some bleeding, dressing intact. Wound vac in place. CMS intact, toes slightly puffy.   Up with minimal assist to restroom. Flatus +.

## 2018-03-24 NOTE — PLAN OF CARE
Problem: Patient Care Overview  Goal: Plan of Care/Patient Progress Review  Outcome: No Change  Up with SBA and walker to bathroom. Steady gait maintaining NWB. Encouraged ankle pumps. Encouraged increased po intake especially protein. Will continue to monitor.

## 2018-03-24 NOTE — PROGRESS NOTES
D: Consult acknowledged. Reviewed medical chart. Per MD, it is the recommendation that patient be discharged with home infusion. Informed CC of this. See note. No other SW needs apparent at this time.

## 2018-03-24 NOTE — CONSULTS
Received RN consult: low body weight and albumin. Very thin. Requires more intake for healing. ?eating disorder??.    Pt has not been weighed, ordered wt this morning and it has not been documented.    Per review of meals, pt is ordering only 1 meal/day - dinner - and it is always the same: turkey deli sandwich, nothing else and no beverage.  This sandwich is 490 calories and 30 gm protein.  When pt was here 1 month ago that is all she ordered then as well.    Once pt's current weight is obtained, will do full assessment and make recommendations as appropriate.    Sudha Brown RD  Pager [phone] (M-F)            [phone] ([facility] & Hol)

## 2018-03-24 NOTE — DISCHARGE INSTRUCTIONS
Change dressing daily with adaptic, gauze, ace wrap, and immobilizer.  May leave skin graft donor dressing in place.  Keep right leg elevated.  See Dr. Duarte in about 10 days, call [phone] for appointment.

## 2018-03-24 NOTE — PROGRESS NOTES
Benefit check for [facility]:  Pt has [employer] I have to call insur to make sure we are in network and to see if pt has home infusion coverage.   Insurance is close over the weekend.   If pt wants to discharge patient would have to sign a self-pay agreement and take the risk of discharging. But if insurance say we are not in network, than pt would be responsible for the bill. It s up to the patient or the patient can stay in the hospital until Monday when we can verify benefits?   Per Zulma Lynch, [facility]

## 2018-03-24 NOTE — PLAN OF CARE
Problem: Patient Care Overview  Goal: Plan of Care/Patient Progress Review  Outcome: Improving  Pt. Remains A/O. Up IND. PICC LUE, ANUSHKA, IV ABX. Pain controlled with PO dilaudid. Dressing change completed by MD and wound vac D/C'd. DANIEL drain, remove tomorrow per ortho. Immobilizer on at all times. D/C home tomorrow. Continue to monitor.

## 2018-03-25 ENCOUNTER — HOME INFUSION (PRE-WILLOW HOME INFUSION) (OUTPATIENT)
Dept: PHARMACY | Facility: CLINIC | Age: 58
End: 2018-03-25

## 2018-03-25 VITALS
RESPIRATION RATE: 16 BRPM | SYSTOLIC BLOOD PRESSURE: 105 MMHG | WEIGHT: 110.2 LBS | HEART RATE: 87 BPM | OXYGEN SATURATION: 94 % | DIASTOLIC BLOOD PRESSURE: 72 MMHG | TEMPERATURE: 97.3 F | BODY MASS INDEX: 17.79 KG/M2

## 2018-03-25 PROCEDURE — 40000239 ZZH STATISTIC VAT ROUNDS

## 2018-03-25 PROCEDURE — 40000257 ZZH STATISTIC CONSULT NO CHARGE VASC ACCESS

## 2018-03-25 PROCEDURE — 25000132 ZZH RX MED GY IP 250 OP 250 PS 637: Performed by: ORTHOPAEDIC SURGERY

## 2018-03-25 PROCEDURE — 25000128 H RX IP 250 OP 636: Performed by: ORTHOPAEDIC SURGERY

## 2018-03-25 PROCEDURE — 25000125 ZZHC RX 250: Performed by: ORTHOPAEDIC SURGERY

## 2018-03-25 PROCEDURE — 27210995 ZZH RX 272: Performed by: ORTHOPAEDIC SURGERY

## 2018-03-25 RX ORDER — BUPIVACAINE HYDROCHLORIDE AND EPINEPHRINE 2.5; 5 MG/ML; UG/ML
30 INJECTION, SOLUTION INFILTRATION; PERINEURAL ONCE
Status: DISCONTINUED | OUTPATIENT
Start: 2018-03-25 | End: 2018-03-25

## 2018-03-25 RX ORDER — BUPIVACAINE HYDROCHLORIDE AND EPINEPHRINE 5; 5 MG/ML; UG/ML
30 INJECTION, SOLUTION PERINEURAL ONCE
Status: COMPLETED | OUTPATIENT
Start: 2018-03-25 | End: 2018-03-25

## 2018-03-25 RX ORDER — ZOLPIDEM TARTRATE 5 MG/1
5 TABLET ORAL
Qty: 14 TABLET | Refills: 0 | Status: SHIPPED | OUTPATIENT
Start: 2018-03-25 | End: 2021-04-29

## 2018-03-25 RX ADMIN — HYDROMORPHONE HYDROCHLORIDE 4 MG: 2 TABLET ORAL at 03:02

## 2018-03-25 RX ADMIN — CEFAZOLIN SODIUM 1 G: 10 INJECTION, POWDER, FOR SOLUTION INTRAVENOUS at 08:34

## 2018-03-25 RX ADMIN — BUPIVACAINE HYDROCHLORIDE AND EPINEPHRINE BITARTRATE 30 ML: 5; .005 INJECTION, SOLUTION PERINEURAL at 12:09

## 2018-03-25 RX ADMIN — CEFAZOLIN SODIUM 1 G: 10 INJECTION, POWDER, FOR SOLUTION INTRAVENOUS at 00:50

## 2018-03-25 RX ADMIN — SENNOSIDES AND DOCUSATE SODIUM 2 TABLET: 8.6; 5 TABLET ORAL at 08:34

## 2018-03-25 RX ADMIN — HYDROMORPHONE HYDROCHLORIDE 2 MG: 2 TABLET ORAL at 12:09

## 2018-03-25 RX ADMIN — ACETAMINOPHEN 975 MG: 325 TABLET, FILM COATED ORAL at 08:34

## 2018-03-25 RX ADMIN — HYDROMORPHONE HYDROCHLORIDE 4 MG: 2 TABLET ORAL at 06:23

## 2018-03-25 NOTE — PLAN OF CARE
Problem: Patient Care Overview  Goal: Plan of Care/Patient Progress Review  Outcome: Improving  Foot now 1+ edema, lindsey than earlier. Pain controlled with dilaudid po. Immobilizer on at all times. DANIEL intact and draining small amount. Po fair. Will continue to monitor.

## 2018-03-25 NOTE — PLAN OF CARE
Problem: Patient Care Overview  Goal: Plan of Care/Patient Progress Review  Outcome: Adequate for Discharge Date Met: 03/25/18  Pt remains A/O. PICC Line in place. Sent home with all belongings including some dressings. Paperwork and medications reviewed. No further questions. Discharged with  at 1245. Dressing changed by surgeon this afternoon. Pt will d/c with wilfrido drain in per ortho.

## 2018-03-25 NOTE — PLAN OF CARE
Problem: Surgery Nonspecified (Adult)  Goal: Signs and Symptoms of Listed Potential Problems Will be Absent, Minimized or Managed (Surgery Nonspecified)  Signs and symptoms of listed potential problems will be absent, minimized or managed by discharge/transition of care (reference Surgery Nonspecified (Adult) CPG).   Outcome: Improving  Pt is A&Ox4, VSS on RA, afebrile. +1 edema on the right foot. CMS+ and drainage under the skin graft. DANIEL drain intact and draining. Pain well managed by PO medications. Independent in the room. Will continue to monitor.

## 2018-03-25 NOTE — PROGRESS NOTES
Dressing  Changed  At  Donor  Site    Ehsan  With epi    No  Active bleeding.  New  tegaderm  Applied.    D/c  Later  Today        willl  Follow  Up  With plastcs  On Tuesday  April  3, 2018      went  Over needs  To increase  To about  2000 celestina/ day

## 2018-03-25 NOTE — PROGRESS NOTES
Spoke with Anibal from [facility] re: order for Home Infusion.  They are unable to get authorization for home abx until insurance office opens on Monday.  Patient to pay privately for 4 doses until Monday morning.  Discharge pharmacy working on getting medication filled.  Patient updated.  Will continue to follow for discharge needs.

## 2018-03-26 ENCOUNTER — HOME INFUSION (PRE-WILLOW HOME INFUSION) (OUTPATIENT)
Dept: PHARMACY | Facility: CLINIC | Age: 58
End: 2018-03-26

## 2018-03-26 ENCOUNTER — TELEPHONE (OUTPATIENT)
Dept: FAMILY MEDICINE | Facility: CLINIC | Age: 58
End: 2018-03-26

## 2018-03-26 LAB
BACTERIA SPEC CULT: NO GROWTH
SPECIMEN SOURCE: NORMAL

## 2018-03-26 NOTE — TELEPHONE ENCOUNTER
Chief Complaint: Total Knee Replacement Status, Right, H/O Skin Graft,03/25/18,ED/IP 0/2  [phone] (home) [phone] (work)

## 2018-03-26 NOTE — TELEPHONE ENCOUNTER
"ED / Discharge Outreach Protocol    Patient Contact    Attempt # 1    Was call answered?  Yes.  \"May I please speak with <Kimi>\"  Is patient available?   Yes    ED/Discharge Protocol    \"Hi, my name is Luisana Platt, a registered nurse, and I am calling on behalf of Dr. Vargas's office at [facility].  I am calling to follow up and see how things are going for you after your recent visit.\"    \"I see that you were in the (ER/UC/IP) on 3/21/18 - 3/25/18.    How are you doing now that you are home?\" Just home yesterday, had complication, had to redo surgery. Another operation. Lots of pain - taking pain medication. Walkers, crutches, help. Has had BM. Eating okay. Not prescribed exercises due to skin graft     Is patient experiencing symptoms that may require a hospital visit?  No    Discharge Instructions    \"Let's review your discharge instructions.  What is/are the follow-up recommendations?  Pt. Response: Orthopedic surgeon 2 weeks, needs to see plastic surgeon prior - skin flap, skin graft knee.     \"Were you instructed to make a follow-up appointment?\"  Pt. Response: No.  Pt may want to follow up with PCP after she can move around better.     \"When you see the provider, I would recommend that you bring your discharge instructions with you.    Medications    \"How many new medications are you on since your hospitalization/ED visit?\"    0-1  \"How many of your current medicines changed (dose, timing, name, etc.) while you were in the hospital/ED visit?\"   0-1  \"Do you have questions about your medications?\"   No  \"Were you newly diagnosed with heart failure, COPD, diabetes or did you have a heart attack?\"   No  For patients on insulin: \"Did you start on insulin in the hospital or did you have your insulin dose changed?\"   No    Medication reconciliation completed? Yes    Was MTM referral placed (*Make sure to put transitions as reason for referral)?   No    Call Summary    \"Do you have any questions or concerns about " "your condition or care plan at the moment?\"    No  Triage nurse advice given: N/A    Patient was in ER twice in the past year (assess appropriateness of ER visits.)      \"If you have questions or things don't continue to improve, we encourage you contact us through the main clinic number,  ([phone])   Even if the clinic is not open, triage nurses are available 24/7 to help you.     We would like you to know that our clinic has extended hours (provide information).  We also have urgent care (provide details on closest location and hours/contact info)\"    \"Thank you for your time and take care!\"    Luisana RAMAN RN      About your hospital stay            You were admitted on:  March 21, 2018 You last received care in the:  [place] [id number] [facility]      You were discharged on:  March 25, 2018            After Care Instructions           Activity         Your activity upon discharge: activity as tolerated  Elevate    No rom  Of  Knee till cleared               Diet         Follow this diet upon discharge: Regular               Wound care and dressings         Instructions to care for your wound at home: as directed.                        Follow-up Appointments      Follow-up and recommended labs and tests          Follow up with me,  Paresh Oliveira, within  2  1/2  Weeks    discharge.                        Additional Services           Home care nursing referral         RN skilled nursing visit. RN to provide iv abx  And  Labs  As  needed.     If you have any questions at all regarding Home Infusion of antibiotic please contact [facility] at: [phone].  This is their 24 hr nurse line.                   START taking       Dose / Directions     ceFAZolin 1 G/10ML soln for IVP   Commonly known as:  ANCEF   Indication:  Preventative Medication Therapy Used Around Surgery, Infection of the Skin and/or Related Soft Tissue   Used for:  Total knee replacement status, right          Dose:  " 1 g   Inject 10 mLs (1 g) into the vein every 8 hours for 27 days   Quantity:  810 mL   Refills:  0         zolpidem 5 MG tablet   Commonly known as:  AMBIEN   Used for:  Total knee replacement status, right          Dose:  5 mg   Take 1 tablet (5 mg) by mouth nightly as needed for sleep   Quantity:  14 tablet   Refills:  0

## 2018-03-26 NOTE — PROGRESS NOTES
This is a recent snapshot of the patient's [facility] medical record.  For current drug dose and complete information and questions, call [phone] or In Basket pool, fv home infusion ([id number])  CSN Number:  [id number]

## 2018-03-26 NOTE — PROGRESS NOTES
This is a recent snapshot of the patient's [facility] medical record.  For current drug dose and complete information and questions, call [phone] or In Basket pool, fv home infusion ([id number])  CSN Number:  [id number]

## 2018-03-27 ENCOUNTER — HOME INFUSION (PRE-WILLOW HOME INFUSION) (OUTPATIENT)
Dept: PHARMACY | Facility: CLINIC | Age: 58
End: 2018-03-27

## 2018-03-27 LAB
AST SERPL W P-5'-P-CCNC: 55 U/L (ref 0–45)
BACTERIA SPEC CULT: NO GROWTH
BASOPHILS # BLD AUTO: 0 10E9/L (ref 0–0.2)
BASOPHILS NFR BLD AUTO: 0.6 %
BUN SERPL-MCNC: 33 MG/DL (ref 7–30)
CRP SERPL-MCNC: 22.2 MG/L (ref 0–8)
DIFFERENTIAL METHOD BLD: ABNORMAL
EOSINOPHIL # BLD AUTO: 0.4 10E9/L (ref 0–0.7)
EOSINOPHIL NFR BLD AUTO: 5.8 %
ERYTHROCYTE [DISTWIDTH] IN BLOOD BY AUTOMATED COUNT: 13.4 % (ref 10–15)
ERYTHROCYTE [SEDIMENTATION RATE] IN BLOOD BY WESTERGREN METHOD: 54 MM/H (ref 0–30)
GLUCOSE SERPL-MCNC: 92 MG/DL (ref 70–99)
HCT VFR BLD AUTO: 32 % (ref 35–47)
HGB BLD-MCNC: 10.3 G/DL (ref 11.7–15.7)
IMM GRANULOCYTES # BLD: 0 10E9/L (ref 0–0.4)
IMM GRANULOCYTES NFR BLD: 0.2 %
LYMPHOCYTES # BLD AUTO: 1.3 10E9/L (ref 0.8–5.3)
LYMPHOCYTES NFR BLD AUTO: 21.1 %
Lab: NORMAL
MCH RBC QN AUTO: 32.2 PG (ref 26.5–33)
MCHC RBC AUTO-ENTMCNC: 32.2 G/DL (ref 31.5–36.5)
MCV RBC AUTO: 100 FL (ref 78–100)
MONOCYTES # BLD AUTO: 0.6 10E9/L (ref 0–1.3)
MONOCYTES NFR BLD AUTO: 9.5 %
NEUTROPHILS # BLD AUTO: 3.9 10E9/L (ref 1.6–8.3)
NEUTROPHILS NFR BLD AUTO: 62.8 %
NRBC # BLD AUTO: 0 10*3/UL
NRBC BLD AUTO-RTO: 0 /100
PLATELET # BLD AUTO: 370 10E9/L (ref 150–450)
RBC # BLD AUTO: 3.2 10E12/L (ref 3.8–5.2)
SPECIMEN SOURCE: NORMAL
WBC # BLD AUTO: 6.2 10E9/L (ref 4–11)

## 2018-03-27 PROCEDURE — 85025 COMPLETE CBC W/AUTO DIFF WBC: CPT | Performed by: ORTHOPAEDIC SURGERY

## 2018-03-27 PROCEDURE — 84450 TRANSFERASE (AST) (SGOT): CPT | Performed by: ORTHOPAEDIC SURGERY

## 2018-03-27 PROCEDURE — 86140 C-REACTIVE PROTEIN: CPT | Performed by: ORTHOPAEDIC SURGERY

## 2018-03-27 PROCEDURE — 84520 ASSAY OF UREA NITROGEN: CPT | Performed by: ORTHOPAEDIC SURGERY

## 2018-03-27 PROCEDURE — 82947 ASSAY GLUCOSE BLOOD QUANT: CPT | Performed by: ORTHOPAEDIC SURGERY

## 2018-03-27 PROCEDURE — 85652 RBC SED RATE AUTOMATED: CPT | Performed by: ORTHOPAEDIC SURGERY

## 2018-03-27 NOTE — PROGRESS NOTES
This is a recent snapshot of the patient's [facility] medical record.  For current drug dose and complete information and questions, call [phone] or In Basket pool, fv home infusion ([id number])  CSN Number:  [id number]

## 2018-03-28 ENCOUNTER — HOME INFUSION (PRE-WILLOW HOME INFUSION) (OUTPATIENT)
Dept: PHARMACY | Facility: CLINIC | Age: 58
End: 2018-03-28

## 2018-03-29 LAB
BACTERIA SPEC CULT: ABNORMAL
BACTERIA SPEC CULT: ABNORMAL
BACTERIA SPEC CULT: NORMAL
Lab: ABNORMAL
Lab: NORMAL
SPECIMEN SOURCE: ABNORMAL
SPECIMEN SOURCE: NORMAL

## 2018-03-29 NOTE — PROGRESS NOTES
This is a recent snapshot of the patient's [facility] medical record.  For current drug dose and complete information and questions, call [phone] or In Basket pool, fv home infusion ([id number])  CSN Number:  [id number]

## 2018-04-02 LAB
BACTERIA SPEC CULT: NORMAL
Lab: NORMAL
SPECIMEN SOURCE: NORMAL

## 2018-04-02 NOTE — OP NOTE
Procedure Date: 03/22/2018      DATE OF PROCEDURE:  03/22/2018      Dr. Bentley Duarte dictated the bulk of it.      My portion of the irrigation and debridement of the knee, first assisted him with the flap and the coverage.        Identification was made.  Timeout was requested.     initials the legs.  Separate permanents were got.  She has been on antibiotics.  The concern is nonhealing wound so far after surgery.  She is 3 weeks out.  She has got significant malnutrition.  Inadequate intake is felt to be the concern.      A timeout was request, we proceeded then.  My portion, remove all the sutures and staples and then prepped and draped in the usual fashion.  Tourniquet was inflated using sterile tourniquet up to 250.  Went up, thoroughly irrigated out the joint.  Debrided away full thickness skin.  Dr. Duarte was in charge of that.  I washed 2000 mL of antibiotic solution through the joint.  Prior to that, we did take deep cultures, both synovium and fluid, sent that for lab.  Gram stain did come back negative.  Closure was then done at the same time as the flap.  Dr. Duarte performed that.  I performed a synovectomy and irrigation and debridement of the right total knee wound without any component exchange, felt it was a greater risk for further incision problems than it was worth.   In  [place]  My    Independent part  Was  Irrigate debridement, synovectomy  Of   The  Right knee  Joint  With  Retention of the  Components  As there is  No  Known  Infection.    Any degree  Of  Specific  Muscle  And  Skin debridenebt  That  Will be taken  Up by  Dr Bedoya.  Wound  Size  When the initial  Part was completed was  About  5  To  7  Cm  At hte anterior  Mid knee level  And then the rest of the wound/ incision  Was opened about  8 inches  Total  Length. About  3  Liters  Of  Irrigant was use  With je lavage  And  Was  abx  Soln,  Multiple  Cultures  Were  Taken.        ESTEFANIA LUNSFORD MD             D:  2018   T: 2018   MT: GIFTY      Name:     NOMI PA   MRN:      [id number]        Account:        [id number]   :      1960           Procedure Date: 2018      Document: [id number]

## 2018-04-02 NOTE — DISCHARGE SUMMARY
Admit Date:     2018   Discharge Date:     2018      Patient was admitted for wound problems following total knee arthroplasty on the right side.  She required a medial gastroc flap and split thickness skin grafting, done by Dr. Bentley Duarte.  I assisted on that as well as debrided and irrigated the knee.  Cultures preoperative negative.  Intraoperative cultures eventually grew out nothing.  Sed rate is starting to climb a little bit.  We will watch that.  Causation is felt to be severe dietary deficiency.      She was discharged home on cephalexin.      We will see her back in the office in 2 weeks.  She is going to see Dr. Duarte at 2 weeks out.  I will be doing visits to her home, house calls to monitor the wounds.      Any concerns, questions or clarifications, she knows how to get a hold of us.  We reiterated the extreme importance of seeking out guidance for her dietary issues.  We will discuss that again in detail.  Otherwise, she will resume all prehospital care plans, diets, meds.         ESTEFANIA LUNSFORD MD             D: 2018   T: 2018   MT: GIFTY      Name:     NOMI PA   MRN:      [id number]        Account:        [id number]   :      1960           Admit Date:     2018                                  Discharge Date: 2018      Document: [id number]

## 2018-04-03 ENCOUNTER — HOME INFUSION (PRE-WILLOW HOME INFUSION) (OUTPATIENT)
Dept: PHARMACY | Facility: CLINIC | Age: 58
End: 2018-04-03

## 2018-04-03 LAB
AST SERPL W P-5'-P-CCNC: 25 U/L (ref 0–45)
BASOPHILS # BLD AUTO: 0.1 10E9/L (ref 0–0.2)
BASOPHILS NFR BLD AUTO: 0.9 %
BUN SERPL-MCNC: 32 MG/DL (ref 7–30)
CREAT SERPL-MCNC: 0.55 MG/DL (ref 0.52–1.04)
CRP SERPL-MCNC: <2.9 MG/L (ref 0–8)
DIFFERENTIAL METHOD BLD: ABNORMAL
EOSINOPHIL # BLD AUTO: 0.6 10E9/L (ref 0–0.7)
EOSINOPHIL NFR BLD AUTO: 9.7 %
ERYTHROCYTE [DISTWIDTH] IN BLOOD BY AUTOMATED COUNT: 13.6 % (ref 10–15)
ERYTHROCYTE [SEDIMENTATION RATE] IN BLOOD BY WESTERGREN METHOD: 19 MM/H (ref 0–30)
GFR SERPL CREATININE-BSD FRML MDRD: >90 ML/MIN/1.7M2
HCT VFR BLD AUTO: 36.9 % (ref 35–47)
HGB BLD-MCNC: 11.7 G/DL (ref 11.7–15.7)
IMM GRANULOCYTES # BLD: 0 10E9/L (ref 0–0.4)
IMM GRANULOCYTES NFR BLD: 0.2 %
LYMPHOCYTES # BLD AUTO: 1.3 10E9/L (ref 0.8–5.3)
LYMPHOCYTES NFR BLD AUTO: 19.7 %
MCH RBC QN AUTO: 31.8 PG (ref 26.5–33)
MCHC RBC AUTO-ENTMCNC: 31.7 G/DL (ref 31.5–36.5)
MCV RBC AUTO: 100 FL (ref 78–100)
MONOCYTES # BLD AUTO: 0.6 10E9/L (ref 0–1.3)
MONOCYTES NFR BLD AUTO: 9.7 %
NEUTROPHILS # BLD AUTO: 3.8 10E9/L (ref 1.6–8.3)
NEUTROPHILS NFR BLD AUTO: 59.8 %
NRBC # BLD AUTO: 0 10*3/UL
NRBC BLD AUTO-RTO: 0 /100
PLATELET # BLD AUTO: 517 10E9/L (ref 150–450)
RBC # BLD AUTO: 3.68 10E12/L (ref 3.8–5.2)
WBC # BLD AUTO: 6.4 10E9/L (ref 4–11)

## 2018-04-03 PROCEDURE — 86140 C-REACTIVE PROTEIN: CPT | Performed by: ORTHOPAEDIC SURGERY

## 2018-04-03 PROCEDURE — 84520 ASSAY OF UREA NITROGEN: CPT | Performed by: ORTHOPAEDIC SURGERY

## 2018-04-03 PROCEDURE — 82565 ASSAY OF CREATININE: CPT | Performed by: ORTHOPAEDIC SURGERY

## 2018-04-03 PROCEDURE — 85652 RBC SED RATE AUTOMATED: CPT | Performed by: ORTHOPAEDIC SURGERY

## 2018-04-03 PROCEDURE — 85025 COMPLETE CBC W/AUTO DIFF WBC: CPT | Performed by: ORTHOPAEDIC SURGERY

## 2018-04-03 PROCEDURE — 84450 TRANSFERASE (AST) (SGOT): CPT | Performed by: ORTHOPAEDIC SURGERY

## 2018-04-04 ENCOUNTER — HOME INFUSION (PRE-WILLOW HOME INFUSION) (OUTPATIENT)
Dept: PHARMACY | Facility: CLINIC | Age: 58
End: 2018-04-04

## 2018-04-04 NOTE — PROGRESS NOTES
This is a recent snapshot of the patient's [facility] medical record.  For current drug dose and complete information and questions, call [phone] or In Basket pool, fv home infusion ([id number])  CSN Number:  [id number]

## 2018-04-05 NOTE — PROGRESS NOTES
This is a recent snapshot of the patient's [facility] medical record.  For current drug dose and complete information and questions, call [phone] or In Basket pool, fv home infusion ([id number])  CSN Number:  [id number]

## 2018-04-10 ENCOUNTER — HOME INFUSION (PRE-WILLOW HOME INFUSION) (OUTPATIENT)
Dept: PHARMACY | Facility: CLINIC | Age: 58
End: 2018-04-10

## 2018-04-10 LAB
AST SERPL W P-5'-P-CCNC: 26 U/L (ref 0–45)
BASOPHILS # BLD AUTO: 0 10E9/L (ref 0–0.2)
BASOPHILS NFR BLD AUTO: 0.4 %
BUN SERPL-MCNC: 31 MG/DL (ref 7–30)
CREAT SERPL-MCNC: 0.47 MG/DL (ref 0.52–1.04)
CRP SERPL-MCNC: <2.9 MG/L (ref 0–8)
DIFFERENTIAL METHOD BLD: ABNORMAL
EOSINOPHIL # BLD AUTO: 0.3 10E9/L (ref 0–0.7)
EOSINOPHIL NFR BLD AUTO: 3.7 %
ERYTHROCYTE [DISTWIDTH] IN BLOOD BY AUTOMATED COUNT: 13.5 % (ref 10–15)
ERYTHROCYTE [SEDIMENTATION RATE] IN BLOOD BY WESTERGREN METHOD: 9 MM/H (ref 0–30)
GFR SERPL CREATININE-BSD FRML MDRD: >90 ML/MIN/1.7M2
HCT VFR BLD AUTO: 36.9 % (ref 35–47)
HGB BLD-MCNC: 11.9 G/DL (ref 11.7–15.7)
IMM GRANULOCYTES # BLD: 0 10E9/L (ref 0–0.4)
IMM GRANULOCYTES NFR BLD: 0.2 %
LYMPHOCYTES # BLD AUTO: 1.7 10E9/L (ref 0.8–5.3)
LYMPHOCYTES NFR BLD AUTO: 20.7 %
MCH RBC QN AUTO: 32.2 PG (ref 26.5–33)
MCHC RBC AUTO-ENTMCNC: 32.2 G/DL (ref 31.5–36.5)
MCV RBC AUTO: 100 FL (ref 78–100)
MONOCYTES # BLD AUTO: 0.7 10E9/L (ref 0–1.3)
MONOCYTES NFR BLD AUTO: 8.4 %
NEUTROPHILS # BLD AUTO: 5.3 10E9/L (ref 1.6–8.3)
NEUTROPHILS NFR BLD AUTO: 66.6 %
NRBC # BLD AUTO: 0 10*3/UL
NRBC BLD AUTO-RTO: 0 /100
PLATELET # BLD AUTO: 502 10E9/L (ref 150–450)
RBC # BLD AUTO: 3.69 10E12/L (ref 3.8–5.2)
WBC # BLD AUTO: 8 10E9/L (ref 4–11)

## 2018-04-10 PROCEDURE — 85025 COMPLETE CBC W/AUTO DIFF WBC: CPT | Performed by: ORTHOPAEDIC SURGERY

## 2018-04-10 PROCEDURE — 85652 RBC SED RATE AUTOMATED: CPT | Performed by: ORTHOPAEDIC SURGERY

## 2018-04-10 PROCEDURE — 82565 ASSAY OF CREATININE: CPT | Performed by: ORTHOPAEDIC SURGERY

## 2018-04-10 PROCEDURE — 86140 C-REACTIVE PROTEIN: CPT | Performed by: ORTHOPAEDIC SURGERY

## 2018-04-10 PROCEDURE — 84520 ASSAY OF UREA NITROGEN: CPT | Performed by: ORTHOPAEDIC SURGERY

## 2018-04-10 PROCEDURE — 84450 TRANSFERASE (AST) (SGOT): CPT | Performed by: ORTHOPAEDIC SURGERY

## 2018-04-11 ENCOUNTER — HOME INFUSION (PRE-WILLOW HOME INFUSION) (OUTPATIENT)
Dept: PHARMACY | Facility: CLINIC | Age: 58
End: 2018-04-11

## 2018-04-11 NOTE — PROGRESS NOTES
This is a recent snapshot of the patient's [facility] medical record.  For current drug dose and complete information and questions, call [phone] or In Basket pool, fv home infusion ([id number])  CSN Number:  [id number]

## 2018-04-12 NOTE — PROGRESS NOTES
This is a recent snapshot of the patient's [facility] medical record.  For current drug dose and complete information and questions, call [phone] or In Basket pool, fv home infusion ([id number])  CSN Number:  [id number]

## 2018-04-17 ENCOUNTER — HOME INFUSION (PRE-WILLOW HOME INFUSION) (OUTPATIENT)
Dept: PHARMACY | Facility: CLINIC | Age: 58
End: 2018-04-17

## 2018-04-17 LAB
AST SERPL W P-5'-P-CCNC: 25 U/L (ref 0–45)
BASOPHILS # BLD AUTO: 0 10E9/L (ref 0–0.2)
BASOPHILS NFR BLD AUTO: 0.5 %
BUN SERPL-MCNC: 25 MG/DL (ref 7–30)
CREAT SERPL-MCNC: 0.47 MG/DL (ref 0.52–1.04)
CRP SERPL-MCNC: <2.9 MG/L (ref 0–8)
DIFFERENTIAL METHOD BLD: NORMAL
EOSINOPHIL # BLD AUTO: 0.5 10E9/L (ref 0–0.7)
EOSINOPHIL NFR BLD AUTO: 7.7 %
ERYTHROCYTE [DISTWIDTH] IN BLOOD BY AUTOMATED COUNT: 13.3 % (ref 10–15)
ERYTHROCYTE [SEDIMENTATION RATE] IN BLOOD BY WESTERGREN METHOD: 7 MM/H (ref 0–30)
GFR SERPL CREATININE-BSD FRML MDRD: >90 ML/MIN/1.7M2
HCT VFR BLD AUTO: 41.2 % (ref 35–47)
HGB BLD-MCNC: 13.2 G/DL (ref 11.7–15.7)
IMM GRANULOCYTES # BLD: 0 10E9/L (ref 0–0.4)
IMM GRANULOCYTES NFR BLD: 0 %
LYMPHOCYTES # BLD AUTO: 1.1 10E9/L (ref 0.8–5.3)
LYMPHOCYTES NFR BLD AUTO: 17.2 %
MCH RBC QN AUTO: 31.9 PG (ref 26.5–33)
MCHC RBC AUTO-ENTMCNC: 32 G/DL (ref 31.5–36.5)
MCV RBC AUTO: 100 FL (ref 78–100)
MONOCYTES # BLD AUTO: 0.7 10E9/L (ref 0–1.3)
MONOCYTES NFR BLD AUTO: 10.3 %
NEUTROPHILS # BLD AUTO: 4.1 10E9/L (ref 1.6–8.3)
NEUTROPHILS NFR BLD AUTO: 64.3 %
PLATELET # BLD AUTO: 395 10E9/L (ref 150–450)
RBC # BLD AUTO: 4.14 10E12/L (ref 3.8–5.2)
WBC # BLD AUTO: 6.4 10E9/L (ref 4–11)

## 2018-04-17 PROCEDURE — 82565 ASSAY OF CREATININE: CPT | Performed by: ORTHOPAEDIC SURGERY

## 2018-04-17 PROCEDURE — 84520 ASSAY OF UREA NITROGEN: CPT | Performed by: ORTHOPAEDIC SURGERY

## 2018-04-17 PROCEDURE — 85652 RBC SED RATE AUTOMATED: CPT | Performed by: ORTHOPAEDIC SURGERY

## 2018-04-17 PROCEDURE — 84450 TRANSFERASE (AST) (SGOT): CPT | Performed by: ORTHOPAEDIC SURGERY

## 2018-04-17 PROCEDURE — 85025 COMPLETE CBC W/AUTO DIFF WBC: CPT | Performed by: ORTHOPAEDIC SURGERY

## 2018-04-17 PROCEDURE — 86140 C-REACTIVE PROTEIN: CPT | Performed by: ORTHOPAEDIC SURGERY

## 2018-04-18 ENCOUNTER — HOME INFUSION (PRE-WILLOW HOME INFUSION) (OUTPATIENT)
Dept: PHARMACY | Facility: CLINIC | Age: 58
End: 2018-04-18

## 2018-04-18 NOTE — PROGRESS NOTES
This is a recent snapshot of the patient's [facility] medical record.  For current drug dose and complete information and questions, call [phone] or In Basket pool, fv home infusion ([id number])  CSN Number:  [id number]

## 2018-04-19 ENCOUNTER — HOME INFUSION (PRE-WILLOW HOME INFUSION) (OUTPATIENT)
Dept: PHARMACY | Facility: CLINIC | Age: 58
End: 2018-04-19

## 2018-04-19 NOTE — PROGRESS NOTES
This is a recent snapshot of the patient's [facility] medical record.  For current drug dose and complete information and questions, call [phone] or In Basket pool, fv home infusion ([id number])  CSN Number:  [id number]

## 2018-04-20 NOTE — PROGRESS NOTES
This is a recent snapshot of the patient's [facility] medical record.  For current drug dose and complete information and questions, call [phone] or In [facility] pool, fv home infusion ([id number])  CSN Number:  [id number]

## 2018-04-23 ENCOUNTER — HOME INFUSION (PRE-WILLOW HOME INFUSION) (OUTPATIENT)
Dept: PHARMACY | Facility: CLINIC | Age: 58
End: 2018-04-23

## 2018-04-24 ENCOUNTER — HOME INFUSION (PRE-WILLOW HOME INFUSION) (OUTPATIENT)
Dept: PHARMACY | Facility: CLINIC | Age: 58
End: 2018-04-24

## 2018-04-25 ENCOUNTER — HOME INFUSION (PRE-WILLOW HOME INFUSION) (OUTPATIENT)
Dept: PHARMACY | Facility: CLINIC | Age: 58
End: 2018-04-25

## 2018-04-25 NOTE — PROGRESS NOTES
This is a recent snapshot of the patient's [facility] medical record.  For current drug dose and complete information and questions, call [phone] or In Basket pool, fv home infusion ([id number])  CSN Number:  [id number]

## 2018-04-26 NOTE — PROGRESS NOTES
This is a recent snapshot of the patient's [facility] medical record.  For current drug dose and complete information and questions, call [phone] or In Basket pool, fv home infusion ([id number])  CSN Number:  [id number]

## 2019-01-30 ENCOUNTER — TELEPHONE (OUTPATIENT)
Dept: FAMILY MEDICINE | Facility: CLINIC | Age: 59
End: 2019-01-30

## 2019-01-30 NOTE — TELEPHONE ENCOUNTER
Panel Management Review      Patient has the following on her problem list:       Composite cancer screening  Chart review shows that this patient is due/due soon for the following Pap Smear, Mammogram and Colonoscopy  Summary:    Patient is due/failing the following:   COLONOSCOPY, MAMMOGRAM and PAP    Action needed:   Patient needs referral/order:     Type of outreach:    Phone, left message for patient to call back.     Questions for provider review:    None                                                                                                                                    Alison Key CMA on 1/30/2019 at 9:57 AM

## 2019-05-02 DIAGNOSIS — G47.9 SLEEP DIFFICULTIES: Primary | ICD-10-CM

## 2019-05-02 RX ORDER — ZOLPIDEM TARTRATE 5 MG/1
5 TABLET ORAL
Qty: 10 TABLET | Refills: 0 | Status: CANCELLED | OUTPATIENT
Start: 2019-05-02

## 2019-05-02 RX ORDER — ZOLPIDEM TARTRATE 5 MG/1
5 TABLET ORAL
Qty: 10 TABLET | Refills: 0 | Status: SHIPPED | OUTPATIENT
Start: 2019-05-02

## 2019-05-02 NOTE — TELEPHONE ENCOUNTER
Prescription called into [facility] as verbal order.  Routing to physician for cosign.  Naheed Katz, WARDN, RN

## 2020-11-24 ENCOUNTER — TRANSFERRED RECORDS (OUTPATIENT)
Dept: HEALTH INFORMATION MANAGEMENT | Facility: CLINIC | Age: 60
End: 2020-11-24

## 2020-11-25 ENCOUNTER — HOSPITAL ENCOUNTER (OUTPATIENT)
Dept: LAB | Facility: CLINIC | Age: 60
Discharge: HOME | End: 2020-11-25
Admitting: SURGERY
Payer: COMMERCIAL

## 2020-11-25 DIAGNOSIS — H30.93 UVEITIS, POSTERIOR, BILATERAL: Primary | ICD-10-CM

## 2020-11-25 DIAGNOSIS — H30.93 UVEITIS, POSTERIOR, BILATERAL: ICD-10-CM

## 2020-11-25 LAB
ANION GAP SERPL CALCULATED.3IONS-SCNC: 6 MMOL/L (ref 3–14)
BASOPHILS # BLD AUTO: 0 10E9/L (ref 0–0.2)
BASOPHILS NFR BLD AUTO: 0.3 %
BUN SERPL-MCNC: 28 MG/DL (ref 7–30)
CALCIUM SERPL-MCNC: 9.4 MG/DL (ref 8.5–10.1)
CHLORIDE SERPL-SCNC: 106 MMOL/L (ref 94–109)
CO2 SERPL-SCNC: 27 MMOL/L (ref 20–32)
CREAT SERPL-MCNC: 0.64 MG/DL (ref 0.52–1.04)
DIFFERENTIAL METHOD BLD: ABNORMAL
EOSINOPHIL # BLD AUTO: 0.2 10E9/L (ref 0–0.7)
EOSINOPHIL NFR BLD AUTO: 1.7 %
ERYTHROCYTE [DISTWIDTH] IN BLOOD BY AUTOMATED COUNT: 12.4 % (ref 10–15)
ERYTHROCYTE [SEDIMENTATION RATE] IN BLOOD BY WESTERGREN METHOD: 11 MM/H (ref 0–30)
GFR SERPL CREATININE-BSD FRML MDRD: >90 ML/MIN/{1.73_M2}
GLUCOSE SERPL-MCNC: 84 MG/DL (ref 70–99)
HCT VFR BLD AUTO: 42 % (ref 35–47)
HGB BLD-MCNC: 14.2 G/DL (ref 11.7–15.7)
IMM GRANULOCYTES # BLD: 0 10E9/L (ref 0–0.4)
IMM GRANULOCYTES NFR BLD: 0.3 %
LYMPHOCYTES # BLD AUTO: 1.5 10E9/L (ref 0.8–5.3)
LYMPHOCYTES NFR BLD AUTO: 15.8 %
MCH RBC QN AUTO: 34.3 PG (ref 26.5–33)
MCHC RBC AUTO-ENTMCNC: 33.8 G/DL (ref 31.5–36.5)
MCV RBC AUTO: 101 FL (ref 78–100)
MONOCYTES # BLD AUTO: 0.9 10E9/L (ref 0–1.3)
MONOCYTES NFR BLD AUTO: 9.9 %
NEUTROPHILS # BLD AUTO: 6.8 10E9/L (ref 1.6–8.3)
NEUTROPHILS NFR BLD AUTO: 72 %
NRBC # BLD AUTO: 0 10*3/UL
NRBC BLD AUTO-RTO: 0 /100
PLATELET # BLD AUTO: 346 10E9/L (ref 150–450)
POTASSIUM SERPL-SCNC: 3.7 MMOL/L (ref 3.4–5.3)
RBC # BLD AUTO: 4.14 10E12/L (ref 3.8–5.2)
SODIUM SERPL-SCNC: 139 MMOL/L (ref 133–144)
WBC # BLD AUTO: 9.4 10E9/L (ref 4–11)

## 2020-11-25 PROCEDURE — 86617 LYME DISEASE ANTIBODY: CPT | Performed by: SURGERY

## 2020-11-25 PROCEDURE — 36415 COLL VENOUS BLD VENIPUNCTURE: CPT | Performed by: SURGERY

## 2020-11-25 PROCEDURE — 85025 COMPLETE CBC W/AUTO DIFF WBC: CPT | Performed by: SURGERY

## 2020-11-25 PROCEDURE — 86780 TREPONEMA PALLIDUM: CPT | Performed by: SURGERY

## 2020-11-25 PROCEDURE — 86617 LYME DISEASE ANTIBODY: CPT | Mod: 59 | Performed by: SURGERY

## 2020-11-25 PROCEDURE — 86038 ANTINUCLEAR ANTIBODIES: CPT | Performed by: SURGERY

## 2020-11-25 PROCEDURE — 86481 TB AG RESPONSE T-CELL SUSP: CPT | Performed by: SURGERY

## 2020-11-25 PROCEDURE — 85549 MURAMIDASE: CPT | Performed by: SURGERY

## 2020-11-25 PROCEDURE — 86611 BARTONELLA ANTIBODY: CPT | Mod: 59 | Performed by: SURGERY

## 2020-11-25 PROCEDURE — 80048 BASIC METABOLIC PNL TOTAL CA: CPT | Performed by: SURGERY

## 2020-11-25 PROCEDURE — 82164 ANGIOTENSIN I ENZYME TEST: CPT | Performed by: SURGERY

## 2020-11-25 PROCEDURE — 85652 RBC SED RATE AUTOMATED: CPT | Performed by: SURGERY

## 2020-11-27 LAB
ACE SERPL-CCNC: 37 U/L (ref 9–67)
ANA SER QL IF: NEGATIVE
B BURGDOR IGG SER QL IB: NEGATIVE
B BURGDOR IGM SER QL IB: NEGATIVE
B HENSELAE IGG TITR SER IF: NORMAL {TITER}
B HENSELAE IGM TITR SER IF: NORMAL {TITER}
T PALLIDUM AB SER QL: NONREACTIVE

## 2020-11-30 LAB — LYSOZYME SERPL-MCNC: 0.64 UG/ML

## 2020-12-01 LAB
GAMMA INTERFERON BACKGROUND BLD IA-ACNC: 0.08 IU/ML
M TB IFN-G CD4+ BCKGRND COR BLD-ACNC: 9.29 IU/ML
M TB TUBERC IFN-G BLD QL: NEGATIVE
MITOGEN IGNF BCKGRD COR BLD-ACNC: 0 IU/ML
MITOGEN IGNF BCKGRD COR BLD-ACNC: 0 IU/ML

## 2020-12-02 ENCOUNTER — TRANSFERRED RECORDS (OUTPATIENT)
Dept: HEALTH INFORMATION MANAGEMENT | Facility: CLINIC | Age: 60
End: 2020-12-02

## 2020-12-16 ENCOUNTER — TRANSFERRED RECORDS (OUTPATIENT)
Dept: HEALTH INFORMATION MANAGEMENT | Facility: CLINIC | Age: 60
End: 2020-12-16

## 2020-12-16 LAB — RETINOPATHY: NORMAL

## 2021-01-14 ENCOUNTER — HEALTH MAINTENANCE LETTER (OUTPATIENT)
Age: 61
End: 2021-01-14

## 2021-01-28 NOTE — PROGRESS NOTES
NV 1.18  LR 9.27  rx sent    Photo Preface (Leave Blank If You Do Not Want): Photographs were obtained today This is a recent snapshot of the patient's [facility] medical record.  For current drug dose and complete information and questions, call [phone] or In [facility] pool, fv home infusion ([id number])  CSN Number:  [id number]       Detail Level: Zone

## 2021-02-01 ENCOUNTER — TRANSFERRED RECORDS (OUTPATIENT)
Dept: MULTI SPECIALTY CLINIC | Facility: CLINIC | Age: 61
End: 2021-02-01

## 2021-02-01 LAB — RETINOPATHY: NORMAL

## 2021-02-17 ENCOUNTER — TELEPHONE (OUTPATIENT)
Dept: FAMILY MEDICINE | Facility: CLINIC | Age: 61
End: 2021-02-17

## 2021-02-17 NOTE — TELEPHONE ENCOUNTER
Please abstract the following data from this visit with this patient into the appropriate field in Epic:    Tests that can be patient reported without a hard copy:    Eye exam with ophthalmology on this date: 2/1/2021 at [facility] Dr. Usman Sol O.D.     Other Tests found in the patient's chart through Chart Review/Care Everywhere:        Note to Abstraction: If this section is blank, no results were found via Chart Review/Care Everywhere.      Adriana Bejarano MA on 2/17/2021 at 12:01 PM

## 2021-04-19 ENCOUNTER — TRANSFERRED RECORDS (OUTPATIENT)
Dept: HEALTH INFORMATION MANAGEMENT | Facility: CLINIC | Age: 61
End: 2021-04-19

## 2021-04-29 ENCOUNTER — OFFICE VISIT (OUTPATIENT)
Dept: FAMILY MEDICINE | Facility: CLINIC | Age: 61
End: 2021-04-29
Payer: COMMERCIAL

## 2021-04-29 ENCOUNTER — HOSPITAL ENCOUNTER (OUTPATIENT)
Dept: MAMMOGRAPHY | Facility: CLINIC | Age: 61
Discharge: HOME | End: 2021-04-29
Attending: INTERNAL MEDICINE | Admitting: INTERNAL MEDICINE
Payer: COMMERCIAL

## 2021-04-29 VITALS
HEIGHT: 66 IN | BODY MASS INDEX: 18.16 KG/M2 | TEMPERATURE: 97.5 F | OXYGEN SATURATION: 99 % | DIASTOLIC BLOOD PRESSURE: 80 MMHG | SYSTOLIC BLOOD PRESSURE: 138 MMHG | HEART RATE: 74 BPM | WEIGHT: 113 LBS

## 2021-04-29 DIAGNOSIS — Z12.11 SPECIAL SCREENING FOR MALIGNANT NEOPLASMS, COLON: ICD-10-CM

## 2021-04-29 DIAGNOSIS — Z12.31 VISIT FOR SCREENING MAMMOGRAM: ICD-10-CM

## 2021-04-29 DIAGNOSIS — Z00.00 ROUTINE HISTORY AND PHYSICAL EXAMINATION OF ADULT: Primary | ICD-10-CM

## 2021-04-29 DIAGNOSIS — R10.84 ABDOMINAL PAIN, GENERALIZED: ICD-10-CM

## 2021-04-29 DIAGNOSIS — K43.9 EPIGASTRIC HERNIA: ICD-10-CM

## 2021-04-29 PROCEDURE — 99386 PREV VISIT NEW AGE 40-64: CPT | Performed by: INTERNAL MEDICINE

## 2021-04-29 PROCEDURE — 77063 BREAST TOMOSYNTHESIS BI: CPT

## 2021-04-29 ASSESSMENT — MIFFLIN-ST. JEOR: SCORE: 1089.31

## 2021-04-29 NOTE — H&P (VIEW-ONLY)
SUBJECTIVE:   CC: Kimi Ulloa is an 60 year old woman who presents for preventive health visit.     Patient has been advised of split billing requirements and indicates understanding: Yes  Healthy Habits:     Getting at least 3 servings of Calcium per day:  NO    Bi-annual eye exam:  Yes    Dental care twice a year:  Yes    Sleep apnea or symptoms of sleep apnea:  None    Diet:  Regular (no restrictions)    Frequency of exercise:  6-7 days/week    Duration of exercise:  45-60 minutes    Taking medications regularly:  Yes    Medication side effects:  Not applicable    PHQ-2 Total Score: 0    Additional concerns today:  No    Ability to successfully perform activities of daily living: Yes, no assistance needed  Home safety:  none identified   Hearing impairment: none      Abdominal umbilical hernia concerns      Today's PHQ-2 Score:   PHQ-2 ( 1999 Pfizer) 4/27/2021   Q1: Little interest or pleasure in doing things 0   Q2: Feeling down, depressed or hopeless 0   PHQ-2 Score 0   Q1: Little interest or pleasure in doing things Not at all   Q2: Feeling down, depressed or hopeless Not at all   PHQ-2 Score 0       Abuse: Current or Past (Physical, Sexual or Emotional) - No  Do you feel safe in your environment? Yes    Have you ever done Advance Care Planning? (For example, a Health Directive, POLST, or a discussion with a medical provider or your loved ones about your wishes): No, advance care planning information given to patient to review.  Patient plans to discuss their wishes with loved ones or provider.      Social History     Tobacco Use     Smoking status: Never Smoker     Smokeless tobacco: Never Used   Substance Use Topics     Alcohol use: Yes     Comment: occ     If you drink alcohol do you typically have >3 drinks per day or >7 drinks per week? No    Alcohol Use 4/27/2021   Prescreen: >3 drinks/day or >7 drinks/week? No     Reviewed orders with patient.  Reviewed health maintenance and updated orders  "accordingly - Yes  Labs reviewed in EPIC    Breast Cancer Screening:    FSH-7:   Breast CA Risk Assessment (FHS-7) 4/27/2021   Did any of your first-degree relatives have breast or ovarian cancer? No   Did any of your relatives have bilateral breast cancer? No   Did any man in your family have breast cancer? No   Did any woman in your family have breast and ovarian cancer? Yes   Did any woman in your family have breast cancer before age 50 y? Yes   Do you have 2 or more relatives with breast and/or ovarian cancer? No   Do you have 2 or more relatives with breast and/or bowel cancer? No     Pertinent mammograms are reviewed under the imaging tab.       Reviewed and updated as needed this visit by clinical staff                 Reviewed and updated as needed this visit by Provider                Past Medical History:   Diagnosis Date     Arthritis      S/P knee replacement         Review of Systems  CONSTITUTIONAL: NEGATIVE for fever, chills, change in weight  INTEGUMENTARY/SKIN: NEGATIVE for worrisome rashes, moles or lesions  EYES: NEGATIVE for vision changes or irritation  ENT: NEGATIVE for ear, mouth and throat problems  RESP: NEGATIVE for significant cough or SOB  BREAST: NEGATIVE for masses, tenderness or discharge  CV: NEGATIVE for chest pain, palpitations or peripheral edema  GI: POSITIVE for recent abdominal pain in the setting of known chronic epigastric hernia  : NEGATIVE for unusual urinary or vaginal symptoms. No vaginal bleeding.  MUSCULOSKELETAL: NEGATIVE for significant arthralgias or myalgia  NEURO: NEGATIVE for weakness, dizziness or paresthesias  PSYCHIATRIC: NEGATIVE for changes in mood or affect      OBJECTIVE:   /80 (BP Location: Right arm, Patient Position: Sitting, Cuff Size: Adult Regular)   Pulse 74   Temp 97.5  F (36.4  C) (Temporal)   Ht 1.676 m (5' 6\")   Wt 51.3 kg (113 lb)   SpO2 99%   BMI 18.24 kg/m    Physical Exam  GENERAL: alert and no distress  EYES: Eyes grossly normal " to inspection, PERRL and conjunctivae and sclerae normal  HENT: ear canals and TM's normal, nose and mouth without ulcers or lesions  NECK: no adenopathy, no asymmetry, masses, or scars and thyroid normal to palpation  RESP: lungs clear to auscultation - no rales, rhonchi or wheezes  CV: regular rate and rhythm, normal S1 S2, no S3 or S4, no murmur, click or rub, no peripheral edema and peripheral pulses strong  ABDOMEN: soft, nontender, no hepatosplenomegaly, no masses and bowel sounds normal  MS: epigastric hernia symptoms noted, no edema  SKIN: no suspicious lesions or rashes  NEURO: Normal strength and tone, mentation intact and speech normal  PSYCH: mentation appears normal, affect normal/bright    Diagnostic Test Results:  Labs reviewed in Epic    ASSESSMENT/PLAN:   (Z00.00) Routine history and physical examination of adult  (primary encounter diagnosis)  Comment: yearly physical exam today  Plan: Hemoglobin, Platelet count      (Z12.11) Special screening for malignant neoplasms, colon  Comment: patient is due for FIT test for colon cancer screening  Plan: Fecal colorectal cancer screen (FIT)      (Z12.31) Visit for screening mammogram  Comment: patient is due for mammogram  Plan: MA Screen with Implants Bilateral w/Bandar               (R10.84) Abdominal pain, generalized  (K43.9) Epigastric hernia  Comment: recent abdominal pain symptoms in the setting of known chronic epigastric hernia symptoms   Plan: CT Abdomen Pelvis w Contrast  GENERAL SURG ADULT REFERRAL        Addendum 4/30/2021    The patient is also cleared from a cardiovascular standpoint for upcoming umbilical hernia repair surgery by Dr. Inder Cunningham scheduled on 5/12/2021 at [facility] with up to general anesthesia.        Patient has been advised of split billing requirements and indicates understanding: Yes  COUNSELING:  Reviewed preventive health counseling, as reflected in patient instructions  Special attention given to:         "Regular exercise       Healthy diet/nutrition    Estimated body mass index is 17.79 kg/m  as calculated from the following:    Height as of 2/26/18: 1.676 m (5' 6\").    Weight as of 3/25/18: 50 kg (110 lb 3.2 oz).        She reports that she has never smoked. She has never used smokeless tobacco.      Counseling Resources:  ATP IV Guidelines  Pooled Cohorts Equation Calculator  Breast Cancer Risk Calculator  BRCA-Related Cancer Risk Assessment: FHS-7 Tool  FRAX Risk Assessment  ICSI Preventive Guidelines  Dietary Guidelines for Americans, 2010  USDA's MyPlate  ASA Prophylaxis  Lung CA Screening    Patricia Vargas MD  [facility]  "

## 2021-04-29 NOTE — PROGRESS NOTES
SUBJECTIVE:   CC: Kimi Ulloa is an 60 year old woman who presents for preventive health visit.     Patient has been advised of split billing requirements and indicates understanding: Yes  Healthy Habits:     Getting at least 3 servings of Calcium per day:  NO    Bi-annual eye exam:  Yes    Dental care twice a year:  Yes    Sleep apnea or symptoms of sleep apnea:  None    Diet:  Regular (no restrictions)    Frequency of exercise:  6-7 days/week    Duration of exercise:  45-60 minutes    Taking medications regularly:  Yes    Medication side effects:  Not applicable    PHQ-2 Total Score: 0    Additional concerns today:  No    Ability to successfully perform activities of daily living: Yes, no assistance needed  Home safety:  none identified   Hearing impairment: none      Abdominal umbilical hernia concerns      Today's PHQ-2 Score:   PHQ-2 ( 1999 Pfizer) 4/27/2021   Q1: Little interest or pleasure in doing things 0   Q2: Feeling down, depressed or hopeless 0   PHQ-2 Score 0   Q1: Little interest or pleasure in doing things Not at all   Q2: Feeling down, depressed or hopeless Not at all   PHQ-2 Score 0       Abuse: Current or Past (Physical, Sexual or Emotional) - No  Do you feel safe in your environment? Yes    Have you ever done Advance Care Planning? (For example, a Health Directive, POLST, or a discussion with a medical provider or your loved ones about your wishes): No, advance care planning information given to patient to review.  Patient plans to discuss their wishes with loved ones or provider.      Social History     Tobacco Use     Smoking status: Never Smoker     Smokeless tobacco: Never Used   Substance Use Topics     Alcohol use: Yes     Comment: occ     If you drink alcohol do you typically have >3 drinks per day or >7 drinks per week? No    Alcohol Use 4/27/2021   Prescreen: >3 drinks/day or >7 drinks/week? No     Reviewed orders with patient.  Reviewed health maintenance and updated orders  "accordingly - Yes  Labs reviewed in EPIC    Breast Cancer Screening:    FSH-7:   Breast CA Risk Assessment (FHS-7) 4/27/2021   Did any of your first-degree relatives have breast or ovarian cancer? No   Did any of your relatives have bilateral breast cancer? No   Did any man in your family have breast cancer? No   Did any woman in your family have breast and ovarian cancer? Yes   Did any woman in your family have breast cancer before age 50 y? Yes   Do you have 2 or more relatives with breast and/or ovarian cancer? No   Do you have 2 or more relatives with breast and/or bowel cancer? No     Pertinent mammograms are reviewed under the imaging tab.       Reviewed and updated as needed this visit by clinical staff                 Reviewed and updated as needed this visit by Provider                Past Medical History:   Diagnosis Date     Arthritis      S/P knee replacement         Review of Systems  CONSTITUTIONAL: NEGATIVE for fever, chills, change in weight  INTEGUMENTARY/SKIN: NEGATIVE for worrisome rashes, moles or lesions  EYES: NEGATIVE for vision changes or irritation  ENT: NEGATIVE for ear, mouth and throat problems  RESP: NEGATIVE for significant cough or SOB  BREAST: NEGATIVE for masses, tenderness or discharge  CV: NEGATIVE for chest pain, palpitations or peripheral edema  GI: POSITIVE for recent abdominal pain in the setting of known chronic epigastric hernia  : NEGATIVE for unusual urinary or vaginal symptoms. No vaginal bleeding.  MUSCULOSKELETAL: NEGATIVE for significant arthralgias or myalgia  NEURO: NEGATIVE for weakness, dizziness or paresthesias  PSYCHIATRIC: NEGATIVE for changes in mood or affect      OBJECTIVE:   /80 (BP Location: Right arm, Patient Position: Sitting, Cuff Size: Adult Regular)   Pulse 74   Temp 97.5  F (36.4  C) (Temporal)   Ht 1.676 m (5' 6\")   Wt 51.3 kg (113 lb)   SpO2 99%   BMI 18.24 kg/m    Physical Exam  GENERAL: alert and no distress  EYES: Eyes grossly normal " to inspection, PERRL and conjunctivae and sclerae normal  HENT: ear canals and TM's normal, nose and mouth without ulcers or lesions  NECK: no adenopathy, no asymmetry, masses, or scars and thyroid normal to palpation  RESP: lungs clear to auscultation - no rales, rhonchi or wheezes  CV: regular rate and rhythm, normal S1 S2, no S3 or S4, no murmur, click or rub, no peripheral edema and peripheral pulses strong  ABDOMEN: soft, nontender, no hepatosplenomegaly, no masses and bowel sounds normal  MS: epigastric hernia symptoms noted, no edema  SKIN: no suspicious lesions or rashes  NEURO: Normal strength and tone, mentation intact and speech normal  PSYCH: mentation appears normal, affect normal/bright    Diagnostic Test Results:  Labs reviewed in Epic    ASSESSMENT/PLAN:   (Z00.00) Routine history and physical examination of adult  (primary encounter diagnosis)  Comment: yearly physical exam today  Plan: Hemoglobin, Platelet count      (Z12.11) Special screening for malignant neoplasms, colon  Comment: patient is due for FIT test for colon cancer screening  Plan: Fecal colorectal cancer screen (FIT)      (Z12.31) Visit for screening mammogram  Comment: patient is due for mammogram  Plan: MA Screen with Implants Bilateral w/Bandar               (R10.84) Abdominal pain, generalized  (K43.9) Epigastric hernia  Comment: recent abdominal pain symptoms in the setting of known chronic epigastric hernia symptoms   Plan: CT Abdomen Pelvis w Contrast  GENERAL SURG ADULT REFERRAL        Addendum 4/30/2021    The patient is also cleared from a cardiovascular standpoint for upcoming umbilical hernia repair surgery by Dr. Inder Cunningham scheduled on 5/12/2021 at [facility] with up to general anesthesia.        Patient has been advised of split billing requirements and indicates understanding: Yes  COUNSELING:  Reviewed preventive health counseling, as reflected in patient instructions  Special attention given to:         "Regular exercise       Healthy diet/nutrition    Estimated body mass index is 17.79 kg/m  as calculated from the following:    Height as of 2/26/18: 1.676 m (5' 6\").    Weight as of 3/25/18: 50 kg (110 lb 3.2 oz).        She reports that she has never smoked. She has never used smokeless tobacco.      Counseling Resources:  ATP IV Guidelines  Pooled Cohorts Equation Calculator  Breast Cancer Risk Calculator  BRCA-Related Cancer Risk Assessment: FHS-7 Tool  FRAX Risk Assessment  ICSI Preventive Guidelines  Dietary Guidelines for Americans, 2010  USDA's MyPlate  ASA Prophylaxis  Lung CA Screening    Patricia Vargas MD  [facility]  "

## 2021-04-30 ENCOUNTER — TELEPHONE (OUTPATIENT)
Dept: SURGERY | Facility: CLINIC | Age: 61
End: 2021-04-30

## 2021-04-30 ENCOUNTER — OFFICE VISIT (OUTPATIENT)
Dept: SURGERY | Facility: CLINIC | Age: 61
End: 2021-04-30
Payer: COMMERCIAL

## 2021-04-30 ENCOUNTER — HOSPITAL ENCOUNTER (OUTPATIENT)
Dept: CT IMAGING | Facility: CLINIC | Age: 61
Discharge: HOME | End: 2021-04-30
Attending: INTERNAL MEDICINE | Admitting: INTERNAL MEDICINE
Payer: COMMERCIAL

## 2021-04-30 DIAGNOSIS — Z11.59 ENCOUNTER FOR SCREENING FOR OTHER VIRAL DISEASES: ICD-10-CM

## 2021-04-30 DIAGNOSIS — K43.6: ICD-10-CM

## 2021-04-30 DIAGNOSIS — R10.84 ABDOMINAL PAIN, GENERALIZED: ICD-10-CM

## 2021-04-30 DIAGNOSIS — Z12.11 SPECIAL SCREENING FOR MALIGNANT NEOPLASMS, COLON: ICD-10-CM

## 2021-04-30 LAB
HGB BLD-MCNC: 15.6 G/DL (ref 11.7–15.7)
PLATELET # BLD AUTO: 456 10E9/L (ref 150–450)

## 2021-04-30 PROCEDURE — 85049 AUTOMATED PLATELET COUNT: CPT | Performed by: INTERNAL MEDICINE

## 2021-04-30 PROCEDURE — 82274 ASSAY TEST FOR BLOOD FECAL: CPT | Performed by: INTERNAL MEDICINE

## 2021-04-30 PROCEDURE — 36415 COLL VENOUS BLD VENIPUNCTURE: CPT | Performed by: INTERNAL MEDICINE

## 2021-04-30 PROCEDURE — 99204 OFFICE O/P NEW MOD 45 MIN: CPT | Performed by: SURGERY

## 2021-04-30 PROCEDURE — 250N000009 HC RX 250: Performed by: INTERNAL MEDICINE

## 2021-04-30 PROCEDURE — 250N000011 HC RX IP 250 OP 636: Performed by: INTERNAL MEDICINE

## 2021-04-30 PROCEDURE — 85018 HEMOGLOBIN: CPT | Performed by: INTERNAL MEDICINE

## 2021-04-30 PROCEDURE — 74177 CT ABD & PELVIS W/CONTRAST: CPT

## 2021-04-30 RX ORDER — IOPAMIDOL 755 MG/ML
55 INJECTION, SOLUTION INTRAVASCULAR ONCE
Status: COMPLETED | OUTPATIENT
Start: 2021-04-30 | End: 2021-04-30

## 2021-04-30 RX ADMIN — SODIUM CHLORIDE 56 ML: 9 INJECTION, SOLUTION INTRAVENOUS at 08:03

## 2021-04-30 RX ADMIN — IOPAMIDOL 55 ML: 755 INJECTION, SOLUTION INTRAVENOUS at 08:00

## 2021-04-30 NOTE — TELEPHONE ENCOUNTER
Type of surgery: open epigastric hernia repair  Location of surgery: [facility]  Date and time of surgery: 5/12/21 10:45am  Surgeon: Dr Cunningham  Pre-Op Appt Date: pt to schedule  Post-Op Appt Date: pt to schedule   Packet sent out: Yes  Pre-cert/Authorization completed:  Not Applicable  Date: 4/30/21

## 2021-04-30 NOTE — LETTER
[facility]    [address] [facility], [address]  [place], [place] [address]  Phone [phone]  Fax [phone] [address], [address]  [facility] [place] [address]  Phone [phone]  Fax [phone]    [email or web address]     April 30, 2021      [facility]         Italo Cunningham MD, MD     Kimi Ulloa MRN# [id number]   YOB: 1960 Age: 60 year old      PCP:  Patricia Vargas [phone]     Chief Complaint:  Epigastric hernia     History of Present Illness:  Kimi Ulloa is a 60 year old female, well known to me, who presented with a bulge above the umbilicus. The bulge comes and goes but has gotten larger over time. It is uncomfortable when the patient is engaging in exertional activity.  She is very fit and active and works out frequently.  Occasionally has episodes of abdominal discomfort and bloating.  CT scan was ordered by her primary and this showed a fat-containing epigastric hernia.  The patient is here to discuss possible surgical repair of the hernia.     PMH:  Kimi Ulloa  has a past medical history of Arthritis and S/P knee replacement.  PSH:  Kimi Ulloa  has a past surgical history that includes Irrigation And Debridement Knee, Place A (7/19/2014); Arthroplasty knee (Left, 2010); Arthroplasty revision knee (7/19/2014); orthopedic surgery (Left); Arthroplasty knee (Right, 2/26/2018); Irrigation and debridement knee, combined (Right, 3/22/2018); and Apply Wound Vac (Right, 3/22/2018).     Home medications and allergies reviewed.     Social History:  Kimi Ulloa  reports that she has never smoked. She has never used smokeless tobacco. She reports current alcohol use. She reports that she does not use drugs.  Family History:  Kimi Ulloa family history includes Abdominal Aortic Aneurysm in her father; Gallbladder Disease in her mother; Heart Disease in  her paternal grandmother; Other Cancer in her mother.     ROS:  The 10 point Review of Systems is negative other than noted in the HPI.     Physical Exam:  not currently breastfeeding.  0 lbs 0 oz  Thin healthy female in no distress.  Pupils equal round and reactive to light.   Pleasant, answers questions appropriately.  Tongue midline, mucous membranes moist.  No cervical lymphadenopathy or thyromegaly.   Lung fields clear, breathing comfortably.   Heart normal sinus rhythm.  No murmurs rubs or gallops.  Abdomen soft, nontender, nondistended.  No umbilical hernia.  Easily palpable epigastric hernia, not fully reducible.  Tender to palpation.         Assessment/plan:  Pleasant healthy female with what appears to be an epigastric hernia. I explained that these are usually not a cause for small bowel incarceration or obstruction, but do become larger over time. They can certainly be uncomfortable and repair is warranted. I recommended an open epigastric hernia repair with mesh. This would normally be done with IV sedation and local. Risks of surgery were explained to the patient, including hernia recurrence, wound infection, or mesh removal.  Patient was going to notify the office when they were ready to schedule surgery.     Inder Cunningham M.D.  [facility]  [phone]

## 2021-05-01 NOTE — PROGRESS NOTES
[facility], [facility], LATOYA Cunningham MD, MD    Kimi Ulloa MRN# [id number]   YOB: 1960 Age: 60 year old     PCP:  Patricia Vargas [phone]    Chief Complaint:  Epigastric hernia    History of Present Illness:  Kimi Ulloa is a 60 year old female, well known to me, who presented with a bulge above the umbilicus. The bulge comes and goes but has gotten larger over time. It is uncomfortable when the patient is engaging in exertional activity.  She is very fit and active and works out frequently.  Occasionally has episodes of abdominal discomfort and bloating.  CT scan was ordered by her primary and this showed a fat-containing epigastric hernia.  The patient is here to discuss possible surgical repair of the hernia.    PMH:  Kimi Ulloa  has a past medical history of Arthritis and S/P knee replacement.  PSH:  Kimi Ulloa  has a past surgical history that includes Irrigation And Debridement Knee, Place A (7/19/2014); Arthroplasty knee (Left, 2010); Arthroplasty revision knee (7/19/2014); orthopedic surgery (Left); Arthroplasty knee (Right, 2/26/2018); Irrigation and debridement knee, combined (Right, 3/22/2018); and Apply Wound Vac (Right, 3/22/2018).    Home medications and allergies reviewed.    Social History:  Kimi Ulloa  reports that she has never smoked. She has never used smokeless tobacco. She reports current alcohol use. She reports that she does not use drugs.  Family History:  Kimi Ulloa family history includes Abdominal Aortic Aneurysm in her father; Gallbladder Disease in her mother; Heart Disease in her paternal grandmother; Other Cancer in her mother.    ROS:  The 10 point Review of Systems is negative other than noted in the HPI.    Physical Exam:  not currently breastfeeding.  0 lbs 0 oz  Thin healthy female in no distress.  Pupils equal round and reactive to light.   Pleasant, answers questions appropriately.  Tongue  midline, mucous membranes moist.  No cervical lymphadenopathy or thyromegaly.   Lung fields clear, breathing comfortably.   Heart normal sinus rhythm.  No murmurs rubs or gallops.  Abdomen soft, nontender, nondistended.  No umbilical hernia.  Easily palpable epigastric hernia, not fully reducible.  Tender to palpation.       Assessment/plan:  Pleasant healthy female with what appears to be an epigastric hernia. I explained that these are usually not a cause for small bowel incarceration or obstruction, but do become larger over time. They can certainly be uncomfortable and repair is warranted. I recommended an open epigastric hernia repair with mesh. This would normally be done with IV sedation and local. Risks of surgery were explained to the patient, including hernia recurrence, wound infection, or mesh removal.  Patient was going to notify the office when they were ready to schedule surgery.    Inder Cunningham M.D.  [facility]  [phone]    Please route or send letter to:  Primary Care Provider (PCP) and Referring Provider

## 2021-05-02 LAB — HEMOCCULT STL QL IA: NEGATIVE

## 2021-05-09 DIAGNOSIS — Z11.59 ENCOUNTER FOR SCREENING FOR OTHER VIRAL DISEASES: ICD-10-CM

## 2021-05-09 LAB
LABORATORY COMMENT REPORT: NORMAL
SARS-COV-2 RNA RESP QL NAA+PROBE: NEGATIVE
SARS-COV-2 RNA RESP QL NAA+PROBE: NORMAL
SPECIMEN SOURCE: NORMAL
SPECIMEN SOURCE: NORMAL

## 2021-05-09 PROCEDURE — U0003 INFECTIOUS AGENT DETECTION BY NUCLEIC ACID (DNA OR RNA); SEVERE ACUTE RESPIRATORY SYNDROME CORONAVIRUS 2 (SARS-COV-2) (CORONAVIRUS DISEASE [COVID-19]), AMPLIFIED PROBE TECHNIQUE, MAKING USE OF HIGH THROUGHPUT TECHNOLOGIES AS DESCRIBED BY CMS-2020-01-R: HCPCS | Performed by: SURGERY

## 2021-05-09 PROCEDURE — U0005 INFEC AGEN DETEC AMPLI PROBE: HCPCS | Performed by: SURGERY

## 2021-05-12 ENCOUNTER — SURGERY (OUTPATIENT)
Age: 61
End: 2021-05-12
Payer: COMMERCIAL

## 2021-05-12 ENCOUNTER — ANESTHESIA EVENT (OUTPATIENT)
Dept: SURGERY | Facility: CLINIC | Age: 61
End: 2021-05-12
Payer: COMMERCIAL

## 2021-05-12 ENCOUNTER — APPOINTMENT (OUTPATIENT)
Dept: SURGERY | Facility: PHYSICIAN GROUP | Age: 61
End: 2021-05-12
Payer: COMMERCIAL

## 2021-05-12 ENCOUNTER — HOSPITAL ENCOUNTER (OUTPATIENT)
Facility: CLINIC | Age: 61
Discharge: HOME | End: 2021-05-12
Attending: SURGERY | Admitting: SURGERY
Payer: COMMERCIAL

## 2021-05-12 ENCOUNTER — ANESTHESIA (OUTPATIENT)
Dept: SURGERY | Facility: CLINIC | Age: 61
End: 2021-05-12
Payer: COMMERCIAL

## 2021-05-12 VITALS
SYSTOLIC BLOOD PRESSURE: 124 MMHG | HEART RATE: 68 BPM | TEMPERATURE: 97.8 F | DIASTOLIC BLOOD PRESSURE: 73 MMHG | WEIGHT: 115.1 LBS | OXYGEN SATURATION: 97 % | BODY MASS INDEX: 18.5 KG/M2 | HEIGHT: 66 IN | RESPIRATION RATE: 20 BRPM

## 2021-05-12 DIAGNOSIS — K43.6: ICD-10-CM

## 2021-05-12 DIAGNOSIS — G89.18 ACUTE POST-OPERATIVE PAIN: Primary | ICD-10-CM

## 2021-05-12 LAB
CHOLEST SERPL-MCNC: 241 MG/DL
HDLC SERPL-MCNC: 87 MG/DL
LDLC SERPL CALC-MCNC: 144 MG/DL
NONHDLC SERPL-MCNC: 154 MG/DL
TRIGL SERPL-MCNC: 49 MG/DL

## 2021-05-12 PROCEDURE — 250N000009 HC RX 250: Performed by: NURSE ANESTHETIST, CERTIFIED REGISTERED

## 2021-05-12 PROCEDURE — 250N000009 HC RX 250: Performed by: SURGERY

## 2021-05-12 PROCEDURE — C1781 MESH (IMPLANTABLE): HCPCS | Performed by: SURGERY

## 2021-05-12 PROCEDURE — 49572 PR REPAIR EPIGASTRIC HERNIA,STRANG: CPT | Mod: AS | Performed by: PHYSICIAN ASSISTANT

## 2021-05-12 PROCEDURE — 49572 PR REPAIR EPIGASTRIC HERNIA,STRANG: CPT | Performed by: SURGERY

## 2021-05-12 PROCEDURE — 250N000011 HC RX IP 250 OP 636: Performed by: NURSE ANESTHETIST, CERTIFIED REGISTERED

## 2021-05-12 PROCEDURE — 370N000017 HC ANESTHESIA TECHNICAL FEE, PER MIN: Performed by: SURGERY

## 2021-05-12 PROCEDURE — 272N000001 HC OR GENERAL SUPPLY STERILE: Performed by: SURGERY

## 2021-05-12 PROCEDURE — 999N000141 HC STATISTIC PRE-PROCEDURE NURSING ASSESSMENT: Performed by: SURGERY

## 2021-05-12 PROCEDURE — 710N000009 HC RECOVERY PHASE 1, LEVEL 1, PER MIN: Performed by: SURGERY

## 2021-05-12 PROCEDURE — 80061 LIPID PANEL: CPT | Performed by: SURGERY

## 2021-05-12 PROCEDURE — 258N000003 HC RX IP 258 OP 636: Performed by: NURSE ANESTHETIST, CERTIFIED REGISTERED

## 2021-05-12 PROCEDURE — 250N000011 HC RX IP 250 OP 636: Performed by: SURGERY

## 2021-05-12 PROCEDURE — 710N000012 HC RECOVERY PHASE 2, PER MINUTE: Performed by: SURGERY

## 2021-05-12 PROCEDURE — 360N000075 HC SURGERY LEVEL 2, PER MIN: Performed by: SURGERY

## 2021-05-12 PROCEDURE — 36415 COLL VENOUS BLD VENIPUNCTURE: CPT | Performed by: SURGERY

## 2021-05-12 DEVICE — VENTRALEX ST HERNIA PATCH, 4.3 CM (1.7"), CIRCLE
Type: IMPLANTABLE DEVICE | Site: ABDOMEN | Status: FUNCTIONAL
Brand: VENTRALEX

## 2021-05-12 RX ORDER — FENTANYL CITRATE 50 UG/ML
INJECTION, SOLUTION INTRAMUSCULAR; INTRAVENOUS PRN
Status: DISCONTINUED | OUTPATIENT
Start: 2021-05-12 | End: 2021-05-12

## 2021-05-12 RX ORDER — SODIUM CHLORIDE, SODIUM LACTATE, POTASSIUM CHLORIDE, CALCIUM CHLORIDE 600; 310; 30; 20 MG/100ML; MG/100ML; MG/100ML; MG/100ML
INJECTION, SOLUTION INTRAVENOUS CONTINUOUS PRN
Status: DISCONTINUED | OUTPATIENT
Start: 2021-05-12 | End: 2021-05-12

## 2021-05-12 RX ORDER — HYDROCODONE BITARTRATE AND ACETAMINOPHEN 5; 325 MG/1; MG/1
1 TABLET ORAL
Status: DISCONTINUED | OUTPATIENT
Start: 2021-05-12 | End: 2021-05-12 | Stop reason: HOSPADM

## 2021-05-12 RX ORDER — LIDOCAINE HYDROCHLORIDE 20 MG/ML
INJECTION, SOLUTION INFILTRATION; PERINEURAL PRN
Status: DISCONTINUED | OUTPATIENT
Start: 2021-05-12 | End: 2021-05-12

## 2021-05-12 RX ORDER — DEXAMETHASONE SODIUM PHOSPHATE 4 MG/ML
INJECTION, SOLUTION INTRA-ARTICULAR; INTRALESIONAL; INTRAMUSCULAR; INTRAVENOUS; SOFT TISSUE PRN
Status: DISCONTINUED | OUTPATIENT
Start: 2021-05-12 | End: 2021-05-12

## 2021-05-12 RX ORDER — PROPOFOL 10 MG/ML
INJECTION, EMULSION INTRAVENOUS CONTINUOUS PRN
Status: DISCONTINUED | OUTPATIENT
Start: 2021-05-12 | End: 2021-05-12

## 2021-05-12 RX ORDER — ONDANSETRON 2 MG/ML
INJECTION INTRAMUSCULAR; INTRAVENOUS PRN
Status: DISCONTINUED | OUTPATIENT
Start: 2021-05-12 | End: 2021-05-12

## 2021-05-12 RX ORDER — HYDROCODONE BITARTRATE AND ACETAMINOPHEN 5; 325 MG/1; MG/1
1-2 TABLET ORAL EVERY 4 HOURS PRN
Qty: 12 TABLET | Refills: 0 | Status: SHIPPED | OUTPATIENT
Start: 2021-05-12 | End: 2021-05-27

## 2021-05-12 RX ORDER — CEFAZOLIN SODIUM 2 G/100ML
2 INJECTION, SOLUTION INTRAVENOUS
Status: DISCONTINUED | OUTPATIENT
Start: 2021-05-12 | End: 2021-05-12 | Stop reason: HOSPADM

## 2021-05-12 RX ORDER — AMOXICILLIN 250 MG
1 CAPSULE ORAL 2 TIMES DAILY
Qty: 15 TABLET | Refills: 0 | Status: SHIPPED | OUTPATIENT
Start: 2021-05-12 | End: 2021-05-27

## 2021-05-12 RX ORDER — CEFAZOLIN SODIUM 2 G/100ML
2 INJECTION, SOLUTION INTRAVENOUS SEE ADMIN INSTRUCTIONS
Status: DISCONTINUED | OUTPATIENT
Start: 2021-05-12 | End: 2021-05-12 | Stop reason: HOSPADM

## 2021-05-12 RX ORDER — PROPOFOL 10 MG/ML
INJECTION, EMULSION INTRAVENOUS PRN
Status: DISCONTINUED | OUTPATIENT
Start: 2021-05-12 | End: 2021-05-12

## 2021-05-12 RX ADMIN — DEXAMETHASONE SODIUM PHOSPHATE 4 MG: 4 INJECTION, SOLUTION INTRA-ARTICULAR; INTRALESIONAL; INTRAMUSCULAR; INTRAVENOUS; SOFT TISSUE at 11:33

## 2021-05-12 RX ADMIN — PROPOFOL 50 MG: 10 INJECTION, EMULSION INTRAVENOUS at 11:21

## 2021-05-12 RX ADMIN — HYDROMORPHONE HYDROCHLORIDE 0.25 MG: 1 INJECTION, SOLUTION INTRAMUSCULAR; INTRAVENOUS; SUBCUTANEOUS at 11:47

## 2021-05-12 RX ADMIN — BUPIVACAINE HYDROCHLORIDE AND EPINEPHRINE BITARTRATE 18 ML: 5; .005 INJECTION, SOLUTION EPIDURAL; INTRACAUDAL; PERINEURAL at 11:49

## 2021-05-12 RX ADMIN — MIDAZOLAM 2 MG: 1 INJECTION INTRAMUSCULAR; INTRAVENOUS at 11:13

## 2021-05-12 RX ADMIN — ONDANSETRON 4 MG: 2 INJECTION INTRAMUSCULAR; INTRAVENOUS at 11:47

## 2021-05-12 RX ADMIN — FENTANYL CITRATE 12.5 MCG: 50 INJECTION, SOLUTION INTRAMUSCULAR; INTRAVENOUS at 11:28

## 2021-05-12 RX ADMIN — SODIUM CHLORIDE, POTASSIUM CHLORIDE, SODIUM LACTATE AND CALCIUM CHLORIDE: 600; 310; 30; 20 INJECTION, SOLUTION INTRAVENOUS at 11:13

## 2021-05-12 RX ADMIN — HYDROMORPHONE HYDROCHLORIDE 0.25 MG: 1 INJECTION, SOLUTION INTRAMUSCULAR; INTRAVENOUS; SUBCUTANEOUS at 11:35

## 2021-05-12 RX ADMIN — PROPOFOL 150 MCG/KG/MIN: 10 INJECTION, EMULSION INTRAVENOUS at 11:16

## 2021-05-12 RX ADMIN — LIDOCAINE HYDROCHLORIDE 20 MG: 20 INJECTION, SOLUTION INFILTRATION; PERINEURAL at 11:15

## 2021-05-12 RX ADMIN — FENTANYL CITRATE 12.5 MCG: 50 INJECTION, SOLUTION INTRAMUSCULAR; INTRAVENOUS at 11:25

## 2021-05-12 RX ADMIN — CEFAZOLIN SODIUM 2 G: 2 INJECTION, SOLUTION INTRAVENOUS at 11:20

## 2021-05-12 ASSESSMENT — MIFFLIN-ST. JEOR: SCORE: 1098.84

## 2021-05-12 ASSESSMENT — COPD QUESTIONNAIRES: COPD: 0

## 2021-05-12 ASSESSMENT — ENCOUNTER SYMPTOMS
SEIZURES: 0
ORTHOPNEA: 0
DYSRHYTHMIAS: 0

## 2021-05-12 ASSESSMENT — LIFESTYLE VARIABLES: TOBACCO_USE: 0

## 2021-05-12 NOTE — ANESTHESIA PREPROCEDURE EVALUATION
Anesthesia Pre-Procedure Evaluation    Patient: Kimi Ulloa   MRN: [id number] : 1960        Preoperative Diagnosis: Irreducible epigastric hernia [K43.6]   Procedure : Procedure(s):  OPEN EPIGASTRIC HERNIA REPAIR     Past Medical History:   Diagnosis Date     Arthritis      S/P knee replacement       Past Surgical History:   Procedure Laterality Date     APPLY WOUND VAC Right 3/22/2018    Procedure: APPLY WOUND VAC;;  Surgeon: Paresh Oliveira MD;  Location:  OR     ARTHROPLASTY KNEE Left      ARTHROPLASTY KNEE Right 2018    Procedure: ARTHROPLASTY KNEE;  RIGHT TOTAL KNEE ARTHROPLASTY  ;  Surgeon: Paresh Oliveira MD;  Location:  OR     ARTHROPLASTY REVISION KNEE  2014    Procedure: ARTHROPLASTY REVISION KNEE;  Surgeon: Paresh Oliveira MD;  Location:  OR     IRRIGATION AND DEBRIDEMENT KNEE, COMBINED Right 3/22/2018    Procedure: COMBINED IRRIGATION AND DEBRIDEMENT KNEE;  RIGHT KNEE IRRIGATION AND DEBRIDEMENT WOUND VAC APPLICATION, ROTATIONAL MEDIAL GASTROC FLAP, SPLIT THICKNESS GRAFT, REPAIR RETINACULUM, DRAIN PLACEMENT;  Surgeon: Paresh Oliveira MD;  Location:  OR     IRRIGATION AND DEBRIDEMENT KNEE, PLACE ANTIBIOTIC CEMENT BEADS / SPACE  2014    Procedure: COMBINED IRRIGATION AND DEBRIDEMENT KNEE, PLACE ANTIBIOTIC CEMENT BEADS / SPACER;  Surgeon: Paresh Oliveira MD;  Location:  OR     ORTHOPEDIC SURGERY Left       left hip surgery     ORTHOPEDIC SURGERY      shoulder      Allergies   Allergen Reactions     Bees Anaphylaxis     Morphine Nausea and Vomiting     Vancomycin Rash      Social History     Tobacco Use     Smoking status: Never Smoker     Smokeless tobacco: Never Used   Substance Use Topics     Alcohol use: Yes     Comment: occ      Wt Readings from Last 1 Encounters:   21 52.2 kg (115 lb 1.6 oz)        Prior to Admission medications    Medication Sig Start Date End Date Taking? Authorizing Provider   EPINEPHrine (EPIPEN/ADRENACLICK/OR ANY BX  GENERIC EQUIV) 0.3 MG/0.3ML injection 2-pack Inject 0.3 mLs (0.3 mg) into the muscle once as needed for anaphylaxis 2/22/18  Yes Patricia Vargas MD   IBUPROFEN PO Take 800 mg by mouth daily as needed for moderate pain    Yes Reported, Patient   multivitamin, therapeutic (THERA-VIT) TABS Take 1 tablet by mouth daily   Yes Unknown, Entered By History   zolpidem (AMBIEN) 5 MG tablet Take 1 tablet (5 mg) by mouth nightly as needed for sleep 5/2/19   Bentley Padilla MD     Recent Labs   Lab Test 07/18/14 2025   ABO A   RH  Pos     No results for input(s): HCG in the last 44951 hours.  Recent Results (from the past 744 hour(s))   MA Screen with Implants Bilateral w/Bandar    Narrative    SCREENING MAMMOGRAM, BILATERAL, DIGITAL w/CAD and TOMOSYNTHESIS -  4/29/2021 11:19 AM    BREAST SYMPTOMS: No current breast complaints.     COMPARISON:  new baseline.    BREAST DENSITY: Scattered fibroglandular densities.    COMMENTS: No findings of suspicion for malignancy.   There are  subpectoral saline implants.      Impression    IMPRESSION: BI-RADS CATEGORY: 2 - Benign.    RECOMMENDED FOLLOW-UP: Annual Mammography.    Exam results letter mailed to patient.      SONIA FORTUNE MD   CT Abdomen Pelvis w Contrast    Narrative    CT ABDOMEN AND PELVIS WITH CONTRAST 4/30/2021 8:13 AM    CLINICAL HISTORY: Abdominal distension. Abdominal pain, acute,  nonlocalized. Abdominal pain, generalized.    TECHNIQUE: CT scan of the abdomen and pelvis was performed following  injection of IV contrast. Multiplanar reformats were obtained. Dose  reduction techniques were used.    CONTRAST: 55 mL Isovue-370    COMPARISON: None.    FINDINGS:   LOWER CHEST: Mild motion artifact, no definite infiltrates or  effusions.    HEPATOBILIARY: No significant mass or bile duct dilatation. No  calcified gallstones.     PANCREAS: No significant mass, duct dilatation, or inflammatory  change.    SPLEEN: Normal size.    ADRENAL GLANDS: No significant  nodules.    KIDNEYS/BLADDER: No significant mass, stones, or hydronephrosis.    BOWEL: No obstruction or inflammatory change.    PELVIC ORGANS: No pelvic masses.    ADDITIONAL FINDINGS: No ascites.    MUSCULOSKELETAL: Survey of the visualized bony structures demonstrates  no destructive bony lesions.      Impression    IMPRESSION:   1.  No acute process demonstrated in the abdomen and pelvis.    TON WEINER MD     Anesthesia Evaluation   Pt has had prior anesthetic. Type: General.    No history of anesthetic complications       ROS/MED HX  ENT/Pulmonary:    (-) tobacco use, asthma, COPD, sleep apnea and recent URI   Neurologic:    (-) no seizures and no CVA   Cardiovascular:    (-) angina, hypertension, CAD, CHF, HERNANDEZ, orthopnea/PND, syncope, arrhythmias, irregular heartbeat/palpitations, valvular problems/murmurs, angina, murmur and wheezes   METS/Exercise Tolerance: >4 METS    Hematologic:       Musculoskeletal:   (+) arthritis,     GI/Hepatic:    (-) GERD and liver disease   Renal/Genitourinary:    (-) renal disease   Endo:    (-) Type II DM, thyroid disease and chronic steroid usage   Psychiatric/Substance Use:    (-) alcohol abuse history   Infectious Disease:       Malignancy:       Other:            Physical Exam    Airway        Mallampati: II   TM distance: > 3 FB   Neck ROM: full   Mouth opening: > 3 cm    Respiratory Devices and Support         Dental  no notable dental history         Cardiovascular          Rhythm and rate: regular and normal (-) no murmur    Pulmonary           breath sounds clear to auscultation   (-) no rhonchi and no wheezes        OUTSIDE LABS:  CBC:   Lab Results   Component Value Date    WBC 9.4 11/25/2020    WBC 6.4 04/17/2018    HGB 15.6 04/30/2021    HGB 14.2 11/25/2020    HCT 42.0 11/25/2020    HCT 41.2 04/17/2018     (H) 04/30/2021     11/25/2020     BMP:   Lab Results   Component Value Date     11/25/2020     03/22/2018    POTASSIUM 3.7 11/25/2020     POTASSIUM 4.0 03/22/2018    CHLORIDE 106 11/25/2020    CHLORIDE 110 (H) 03/22/2018    CO2 27 11/25/2020    CO2 24 03/22/2018    BUN 28 11/25/2020    BUN 25 04/17/2018    CR 0.64 11/25/2020    CR 0.47 (L) 04/17/2018    GLC 84 11/25/2020    GLC 92 03/27/2018     COAGS:   Lab Results   Component Value Date    PTT 26 04/14/2010    INR 1.43 (H) 08/11/2014     POC:   Lab Results   Component Value Date     (H) 03/23/2018     HEPATIC:   Lab Results   Component Value Date    ALBUMIN 3.1 (L) 03/22/2018    PROTTOTAL 6.3 (L) 03/22/2018    ALT 17 03/22/2018    AST 25 04/17/2018    ALKPHOS 97 03/22/2018    BILITOTAL 0.3 03/22/2018     OTHER:   Lab Results   Component Value Date    JODI 9.4 11/25/2020    CRP <2.9 04/17/2018    SED 11 11/25/2020       Anesthesia Plan    ASA Status:  1   NPO Status:  NPO Appropriate    Anesthesia Type: MAC.     - Reason for MAC: straight local not clinically adequate              Consents    Anesthesia Plan(s) and associated risks, benefits, and realistic alternatives discussed. Questions answered and patient/representative(s) expressed understanding.     - Discussed with:  Patient         Postoperative Care    Pain management: Multi-modal analgesia.   PONV prophylaxis: Ondansetron (or other 5HT-3), Background Propofol Infusion     Comments:                Agus Barone MD

## 2021-05-12 NOTE — OP NOTE
General Surgery Operative Note    PREOPERATIVE DIAGNOSIS:  Irreducible epigastric hernia [K43.6]    POSTOPERATIVE DIAGNOSIS:  Same    PROCEDURE:    OPEN EPIGASTRIC HERNIA REPAIR    ANESTHESIA:  MAC and local    PREOPERATIVE MEDICATIONS:  Ancef IV.    SURGEON:  Italo Cunningham MD    ASSISTANT:  Fernanda García PA-C  First assistant was necessary due to challenging exposure and the need for improved visualization and help maintaining hemostasis.    INDICATIONS:  Fat containing epigastric Hernia    DESCRIPTION OF PROCEDURE: The patient was taken to the operating suite and given IV sedation. The area above the umbilicus was prepped and draped in a sterile fashion. Surgeon initiated timeout was acknowledged. We made a vertical incision above the umbilicus and took this down through skin and subcutaneous tissue. I dissected this down to the level of the fascia and began clearing the fascia off.  I cleared off the fascial edge around the hernia defect which measured approximately one cm. I  the sac from the mouth of the hernia and was able to dunk the sac and contents, which were preperitoneal fat back into the abdomen. I then cleared out the preperitoneal space for a distance of several centimeters circumferentially. I deployed an 4.3 cm Ventralux patch within this space. I used two 0- Vicryl sutures at the apices to secure the mesh prior to deployment. Once I was satisfied with the position of the mesh in the preperitoneal space, I reapproximated the fascia transversely over the mesh using several interrupted 2-0 PDS sutures. The wound was irrigated.  Wound was closed in layers using 3-0 and 4-0 Vicryl and Steri-Strips. At the conclusion of the case, all lap and needle counts were correct.     ESTIMATED BLOOD LOSS:  2 mL    INTRAOPERATIVE FINDINGS:  1 cm epigastric hernia containing preperitoneal fat.  Repaired with preperitoneal mesh and closed primarily.    Italo Cunningham MD, MD

## 2021-05-12 NOTE — ANESTHESIA CARE TRANSFER NOTE
Patient: Kimi Ulloa    Procedure(s):  OPEN EPIGASTRIC HERNIA REPAIR    Diagnosis: Irreducible epigastric hernia [K43.6]  Diagnosis Additional Information: No value filed.    Anesthesia Type:   MAC     Note:    Oropharynx: oropharynx clear of all foreign objects  Level of Consciousness: awake  Oxygen Supplementation: face mask  Level of Supplemental Oxygen (L/min / FiO2): 6  Independent Airway: airway patency satisfactory and stable  Dentition: dentition unchanged  Vital Signs Stable: post-procedure vital signs reviewed and stable  Report to RN Given: handoff report given  Patient transferred to: PACU    Handoff Report: Identifed the Patient, Identified the Reponsible Provider, Reviewed the pertinent medical history, Discussed the surgical course, Reviewed Intra-OP anesthesia mangement and issues during anesthesia, Set expectations for post-procedure period and Allowed opportunity for questions and acknowledgement of understanding      Vitals: (Last set prior to Anesthesia Care Transfer)  CRNA VITALS  5/12/2021 1130 - 5/12/2021 1204      5/12/2021             Resp Rate (set):  10        Electronically Signed By: CANDY Maria CRNA  May 12, 2021  12:04 PM

## 2021-05-12 NOTE — DISCHARGE INSTRUCTIONS
[facility]  Discharge Instructions: Post-Operative Open Ventral Hernia Repair    ACTIVITY    Take frequent, short walks and increase your activity gradually.      Avoid strenuous physical activity or heavy lifting greater than 15lbs. for 3-4 weeks.  You may climb stairs.    You may drive without restrictions when you are not using any prescription pain medication and feel comfortable in a car.    You may return to work/school when you are comfortable without any prescription pain medication.    WOUND CARE    You may remove your bandage and shower 48 hours after the surgery.  Pat your incision dry and leave it open to air.  Re-apply dressing (Band-Aids or gauze/tape) as needed for comfort or drainage.    You may have steri-strips (looks like white tape) on your incision.  You may peel off the steri-strips 2 weeks after your surgery if they have not peeled off on their own.     Do not soak your incision in a tub or pool for 2 weeks.     Do not apply any lotions, creams, or ointments to your incision.    A ridge under your incision is normal and will gradually resolve.    DIET    Start with liquids, then gradually resume your regular diet as tolerated.     Drink plenty of fluids to stay hydrated.    PAIN    Expect some tenderness and discomfort at the incision site(s).  Use the prescribed pain medication at your discretion.  Expect gradual resolution of your pain over several days.    You may take ibuprofen with food (unless you have been told not to) instead of or in addition to your prescribed pain medication.  If you are taking Norco or Percocet, do not take any additional acetaminophen/Tylenol.    Do not drink alcohol or drive while you are taking pain medications.    You may apply ice to your incisions in 20 minute intervals as needed for the next 48 hours.  After that time, consider switching to heat if you prefer.    EXPECTATIONS    Pain medications can cause constipation.  Limit  use when possible.  Take over the counter stool softener/stimulant, such as Colace or Senna, 1-2 times a day with plenty of water.  You may take a mild over the counter laxative, such as Miralax or a suppository, as needed.  ***You may take 1 oz. (2 tablespoons) Milk of Magnesia the evening following surgery to encourage bowel movement.    You may discontinue these medications once you are having regular bowel movements and/or are no longer taking your narcotic pain medication.      FOLLOW UP    Our office will contact you in approximately 2-3 weeks to check on your progress and answer any questions you may have.  If you are doing well, you will not need to return for a follow up appointment.  If any concerns are identified over the phone, we will help you make an appointment to see a provider.     If you have not received a phone call, have any questions or concerns, or would like to be seen, please call us at [phone] and ask to speak with our nurse.  We are located at [address] [place] [address].    CALL OUR OFFICE [phone] IF YOU HAVE:     Chills or fever above 101 F.    Increased redness, warmth, or drainage at your incisions.    Significant bleeding.    Pain not relieved by your pain medication or rest.    Increasing pain after the first 48 hours.    Any other concerns or questions.           Revised September 2020      Same Day Surgery Discharge Instructions for  Sedation and General Anesthesia       It's not unusual to feel dizzy, light-headed or faint for up to 24 hours after surgery or while taking pain medication.  If you have these symptoms: sit for a few minutes before standing and have someone assist you when you get up to walk or use the bathroom.      You should rest and relax for the next 24 hours. We recommend you make arrangements to have an adult stay with you for at least 24 hours after your discharge.  Avoid hazardous and strenuous activity.      DO NOT DRIVE  any vehicle or operate mechanical equipment for 24 hours following the end of your surgery.  Even though you may feel normal, your reactions may be affected by the medication you have received.      Do not drink alcoholic beverages for 24 hours following surgery.       Slowly progress to your regular diet as you feel able. It's not unusual to feel nauseated and/or vomit after receiving anesthesia.  If you develop these symptoms, drink clear liquids (apple juice, ginger ale, broth, 7-up, etc. ) until you feel better.  If your nausea and vomiting persists for 24 hours, please notify your surgeon.        All narcotic pain medications, along with inactivity and anesthesia, can cause constipation. Drinking plenty of liquids and increasing fiber intake will help.      For any questions of a medical nature, call your surgeon.      Do not make important decisions for 24 hours.      If you had general anesthesia, you may have a sore throat for a couple of days related to the breathing tube used during surgery.  You may use Cepacol lozenges to help with this discomfort.  If it worsens or if you develop a fever, contact your surgeon.       If you feel your pain is not well managed with the pain medications prescribed by your surgeon, please contact your surgeon's office to let them know so they can address your concerns.       CoVid 19 Information    We want to give you information regarding Covid. Please consult your primary care provider with any questions you might have.     Patient who have symptoms (cough, fever, or shortness of breath), need to isolate for 7 days from when symptoms started OR 72 hours after fever resolves (without fever reducing medications) AND improvement of respiratory symptoms (whichever is longer).      Isolate yourself at home (in own room/own bathroom if possible)    Do Not allow any visitors    Do Not go to work or school    Do Not go to Christian,  centers, shopping, or other public  places.    Do Not shake hands.    Avoid close and intimate contact with others (hugging, kissing).    Follow CDC recommendations for household cleaning of frequently touched services.     After the initial 7 days, continue to isolate yourself from household members as much as possible. To continue decrease the risk of community spread and exposure, you and any members of your household should limit activities in public for 14 days after starting home isolation.     You can reference the following CDC link for helpful home isolation/care tips:  https://www.cdc.gov/coronavirus/2019-ncov/downloads/10Things.pdf    Protect Others:    Cover Your Mouth and Nose with a mask, disposable tissue or wash cloth to avoid spreading germs to others.    Wash your hands and face frequently with soap and water    Call Your Primary Doctor If: Breathing difficulty develops or you become worse.    For more information about COVID19 and options for caring for yourself at home, please visit the CDC website at https://www.cdc.gov/coronavirus/2019-ncov/about/steps-when-sick.html  For more options for care at [facility], please visit our website at [email or web address]    **If you have concerns or questions about your procedure,    please contact Dr Cunningham at  [phone]**

## 2021-05-13 NOTE — ANESTHESIA POSTPROCEDURE EVALUATION
Patient: Kimi Ulloa    Procedure(s):  OPEN EPIGASTRIC HERNIA REPAIR    Diagnosis:Irreducible epigastric hernia [K43.6]  Diagnosis Additional Information: No value filed.    Anesthesia Type:  MAC    Note:  Disposition: Outpatient   Postop Pain Control: Uneventful            Sign Out: Well controlled pain   PONV: No   Neuro/Psych: Uneventful            Sign Out: Acceptable/Baseline neuro status   Airway/Respiratory: Uneventful            Sign Out: Acceptable/Baseline resp. status   CV/Hemodynamics: Uneventful            Sign Out: Acceptable CV status   Other NRE: NONE   DID A NON-ROUTINE EVENT OCCUR? No    Event details/Postop Comments:  Pt doing well prior to discharge home.             Last vitals:  Vitals:    05/12/21 1215 05/12/21 1230 05/12/21 1245   BP: 117/68 120/64 124/73   Pulse: 65 63 68   Resp: 15 15 20   Temp:      SpO2: 95% 97% 97%       Last vitals prior to Anesthesia Care Transfer:  CRNA VITALS  5/12/2021 1130 - 5/12/2021 1230      5/12/2021             Resp Rate (set):  10          Electronically Signed By: Agus Barone MD  May 12, 2021  9:15 PM

## 2021-05-26 PROBLEM — S62.109A FX WRIST: Status: ACTIVE | Noted: 2021-05-26

## 2021-05-26 PROBLEM — S82.899A FX ANKLE: Status: ACTIVE | Noted: 2021-05-26

## 2021-05-26 RX ORDER — VALACYCLOVIR HYDROCHLORIDE 1 G/1
TABLET, FILM COATED ORAL
COMMUNITY
Start: 2021-01-06 | End: 2021-05-27

## 2021-05-26 NOTE — PROGRESS NOTES
Kimi is a 60 year old No obstetric history on file. female who presents for annual exam.     Besides routine health maintenance, she has no other health concerns today .    HPI:  The patient's PCP is  Patricia Vargas MD.    Patient is wife of Dr Ramírez Ulloa.  They never had children.   She likes dance and exercise.   No specific health concerns except some ongoing epigastric discomfort at times.    Was seeing Dr Nava for Gyn but insurance changed.     Patient is retired.  Had recent epigastric hernia repair. She says she had a normal CT prior to that. Was having some midline epigastric pain.    Drinks wine most nights. Says she is trying to cut back.   Denies acid reflux type symptoms. No food intolerances.     Prior labs in Nov showed elevated MCV at 101. This can reflect excess alcohol intake or B12 or folate deficiencies.   Recent CT abd and pelvis all normal- no report of liver, pancrease, gallbladder problems seen.     Has some vaginal dryness but uses lubricants  Not on any hormone replacement therapy  Non smoker  Has breast implants      GYNECOLOGIC HISTORY:    No LMP recorded. Patient is postmenopausal.    Her current contraception method is: menopause.  She  reports that she has never smoked. She has never used smokeless tobacco.    Patient is sexually active.  STD testing offered?  Declined     PHQ 5/27/2021   PHQ-9 Total Score 0   Q9: Thoughts of better off dead/self-harm past 2 weeks Not at all     BRYSON-7 SCORE 5/27/2021   Total Score 1       Alcohol Score = 3    HEALTH MAINTENANCE:  Recent Labs   Lab Test 05/12/21  1021 07/19/14  0611   CHOL 241* 183   HDL 87 83   * 87   TRIG 49 61   CHOLHDLRATIO  --  2.2     Last Mammo: 04/30/21, Result: Normal, Next Mammo: 1 year  Pap: Years and years per pt, unsure of when.   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, implant, and graft (H)     S/P knee replacement     Wound dehiscence     H/O skin graft     Irreducible epigastric hernia     Fx ankle     Fx wrist     Osteopenia     Diastasis recti     Past Surgical History:   Procedure Laterality Date     APPLY WOUND VAC Right 3/22/2018    Procedure: APPLY WOUND VAC;;  Surgeon: Paresh Oliveira MD;  Location:  OR     ARTHROPLASTY KNEE Left      ARTHROPLASTY KNEE Right 2018    Procedure: ARTHROPLASTY KNEE;  RIGHT TOTAL KNEE ARTHROPLASTY  ;  Surgeon: Paresh Oliveira MD;  Location:  OR     ARTHROPLASTY REVISION KNEE  2014    Procedure: ARTHROPLASTY REVISION KNEE;  Surgeon: Paresh Oliviera MD;  Location:  OR     HERNIORRHAPHY EPIGASTRIC N/A 2021    Procedure: OPEN EPIGASTRIC HERNIA REPAIR;  Surgeon: Italo Cunningham MD;  Location:  OR     IRRIGATION AND DEBRIDEMENT KNEE, COMBINED Right 3/22/2018    Procedure: COMBINED IRRIGATION AND DEBRIDEMENT KNEE;  RIGHT KNEE IRRIGATION AND DEBRIDEMENT WOUND VAC APPLICATION, ROTATIONAL MEDIAL GASTROC FLAP, SPLIT THICKNESS GRAFT, REPAIR RETINACULUM, DRAIN PLACEMENT;  Surgeon: Paresh Oliveira MD;  Location:  OR     IRRIGATION AND DEBRIDEMENT KNEE, PLACE ANTIBIOTIC CEMENT BEADS / SPACE  2014    Procedure: COMBINED IRRIGATION AND DEBRIDEMENT KNEE, PLACE ANTIBIOTIC CEMENT BEADS / SPACER;  Surgeon: Paresh Oliveira MD;  Location:  OR     ORTHOPEDIC SURGERY Left       left hip surgery     ORTHOPEDIC SURGERY      shoulder      Social History     Tobacco Use     Smoking  "status: Never Smoker     Smokeless tobacco: Never Used   Substance Use Topics     Alcohol use: Yes     Comment: occ      Problem (# of Occurrences) Relation (Name,Age of Onset)    Arthritis (1) Mother (Perneal Toenges)    Gallbladder Disease (1) Mother (Perneal Toenges)    Heart Disease (1) Paternal Grandmother    Hyperlipidemia (1) Father    Hypertension (1) Father    No Known Problems (5) Sister, Brother, Maternal Grandmother, Maternal Grandfather, Other    Other Cancer (1) Mother (Perneal Toenges): Gallbladder Cancer    Rheumatoid Arthritis (2) Paternal Grandmother, Paternal Aunt            Current Outpatient Medications   Medication Sig     EPINEPHrine (EPIPEN/ADRENACLICK/OR ANY BX GENERIC EQUIV) 0.3 MG/0.3ML injection 2-pack Inject 0.3 mLs (0.3 mg) into the muscle once as needed for anaphylaxis     IBUPROFEN PO Take 800 mg by mouth daily as needed for moderate pain      multivitamin, therapeutic (THERA-VIT) TABS Take 1 tablet by mouth daily     valACYclovir (VALTREX) 1000 mg tablet      zolpidem (AMBIEN) 5 MG tablet Take 1 tablet (5 mg) by mouth nightly as needed for sleep     No current facility-administered medications for this visit.      Allergies   Allergen Reactions     Bees Anaphylaxis     Morphine Nausea and Vomiting     Vancomycin      Other reaction(s): Sandie Syndrome     Vancomycin Rash       Past medical, surgical, social and family histories were reviewed and updated in EPIC.    ROS:   12 point review of systems negative other than symptoms noted below or in the HPI.  Gastrointestinal: Bloating and Nausea  Psychiatric: Anxiety  Blood/Lymph: Lymph Node Enlargement  No urinary frequency or dysuria, bladder or kidney problems    EXAM:  BP (!) 140/62   Ht 1.638 m (5' 4.5\")   Wt 52 kg (114 lb 9.6 oz)   BMI 19.37 kg/m     BMI: Body mass index is 19.37 kg/m .    PHYSICAL EXAM:  Constitutional:   Appearance: Well nourished, well developed, alert, in no acute distress  Neck:  Lymph Nodes:  No " lymphadenopathy present    Thyroid:  Gland size normal, nontender, no nodules or masses present  on palpation    Breasts: Inspection of Breasts:  No lymphadenopathy present., Palpation of Breasts and Axillae:  No masses present on palpation, no breast tenderness., Axillary Lymph Nodes:  No lymphadenopathy present. and No nodularity, asymmetry or nipple discharge bilaterally.   Bilateral implants present- saline  Gastrointestinal:   Abdominal Examination:  Abdomen nontender to palpation, tone normal without rigidity or guarding, no masses present, umbilicus without lesions   Liver and Spleen:  No hepatomegaly present, liver nontender to palpation    Hernias:  No hernias present  Lymphatic: Lymph Nodes:  No other lymphadenopathy present  Skin:  General Inspection:  No rashes present, no lesions present, no areas of  discoloration  Neurologic:    Mental Status:  Oriented X3.  Normal strength and tone, sensory exam                grossly normal, mentation intact and speech normal.    Psychiatric:   Mentation appears normal and affect normal/bright.         Pelvic Exam:  External Genitalia:     Normal appearance for age, no discharge present, no tenderness present, no inflammatory lesions present, color normal  Vagina:     Normal vaginal vault without central or paravaginal defects, ATROPHIC  Bladder:     Nontender to palpation  Urethra:   Urethral Body:  Urethra palpation normal, urethra structural support normal   Urethral Meatus:  No erythema or lesions present  Cervix:     Appearance healthy, no lesions present, nontender to palpation, no bleeding present  Uterus:     Nontender to palpation, no masses present, position anteflexed, mobility: normal  Adnexa:     No adnexal tenderness present, no adnexal masses present  Perineum:     Perineum within normal limits, no evidence of trauma, no rashes or skin lesions present  Inguinal Lymph Nodes:     No lymphadenopathy present      COUNSELING:   Reviewed preventive health  counseling, as reflected in patient instructions    BMI: Body mass index is 19.37 kg/m .      ASSESSMENT:  60 year old female with satisfactory annual exam.    ICD-10-CM    1. Encounter for gynecological examination without abnormal finding  Z01.419 Pap imaged thin layer screen with HPV - recommended age 30 - 65     HPV High Risk Types DNA Cervical       PLAN:  Pap and hpv done, discussed current guideline  Discussed recommendations for women to have no more than 5 oz wine daily due to increased risks of liver disease and breast cancer, gastric irritation and esophageal irritation.       Would like her valtrex refilled since gets recurrent facial shingles outbreaks by report   Refill sent today    Annual 3D mammogram    Breast implants are soft and not encapsulated on exam    Discussed vaginal dryness and options for lubricants, vaginal estrogen treatment as an option if desired. [employer] lube brand is favored by some of our patients.     Rec she see [facility] if she continues to have epigastric symptoms.  Might need an upper GI. Discussed that patients can develop gastritis or esophagitis that can cause epigastric discomfort.               Adrianne Carney MD

## 2021-05-27 ENCOUNTER — OFFICE VISIT (OUTPATIENT)
Dept: OBGYN | Facility: CLINIC | Age: 61
End: 2021-05-27
Payer: COMMERCIAL

## 2021-05-27 VITALS
SYSTOLIC BLOOD PRESSURE: 140 MMHG | WEIGHT: 114.6 LBS | DIASTOLIC BLOOD PRESSURE: 62 MMHG | HEIGHT: 65 IN | BODY MASS INDEX: 19.09 KG/M2

## 2021-05-27 DIAGNOSIS — B02.9 HERPES ZOSTER WITHOUT COMPLICATION: ICD-10-CM

## 2021-05-27 DIAGNOSIS — Z01.419 ENCOUNTER FOR GYNECOLOGICAL EXAMINATION WITHOUT ABNORMAL FINDING: Primary | ICD-10-CM

## 2021-05-27 PROCEDURE — 87624 HPV HI-RISK TYP POOLED RSLT: CPT | Performed by: OBSTETRICS & GYNECOLOGY

## 2021-05-27 PROCEDURE — G0145 SCR C/V CYTO,THINLAYER,RESCR: HCPCS | Performed by: OBSTETRICS & GYNECOLOGY

## 2021-05-27 PROCEDURE — 99386 PREV VISIT NEW AGE 40-64: CPT | Performed by: OBSTETRICS & GYNECOLOGY

## 2021-05-27 RX ORDER — VALACYCLOVIR HYDROCHLORIDE 1 G/1
1000 TABLET, FILM COATED ORAL PRN
Qty: 90 TABLET | Refills: 3 | Status: SHIPPED | OUTPATIENT
Start: 2021-05-27 | End: 2024-01-26

## 2021-05-27 SDOH — HEALTH STABILITY: MENTAL HEALTH: HOW OFTEN DO YOU HAVE A DRINK CONTAINING ALCOHOL?: 4 OR MORE TIMES A WEEK

## 2021-05-27 SDOH — HEALTH STABILITY: MENTAL HEALTH: HOW MANY STANDARD DRINKS CONTAINING ALCOHOL DO YOU HAVE ON A TYPICAL DAY?: NOT ASKED

## 2021-05-27 SDOH — HEALTH STABILITY: MENTAL HEALTH: HOW OFTEN DO YOU HAVE 6 OR MORE DRINKS ON ONE OCCASION?: NOT ASKED

## 2021-05-27 ASSESSMENT — ANXIETY QUESTIONNAIRES
GAD7 TOTAL SCORE: 1
2. NOT BEING ABLE TO STOP OR CONTROL WORRYING: NOT AT ALL
7. FEELING AFRAID AS IF SOMETHING AWFUL MIGHT HAPPEN: NOT AT ALL
3. WORRYING TOO MUCH ABOUT DIFFERENT THINGS: NOT AT ALL
IF YOU CHECKED OFF ANY PROBLEMS ON THIS QUESTIONNAIRE, HOW DIFFICULT HAVE THESE PROBLEMS MADE IT FOR YOU TO DO YOUR WORK, TAKE CARE OF THINGS AT HOME, OR GET ALONG WITH OTHER PEOPLE: NOT DIFFICULT AT ALL
6. BECOMING EASILY ANNOYED OR IRRITABLE: NOT AT ALL
1. FEELING NERVOUS, ANXIOUS, OR ON EDGE: SEVERAL DAYS
5. BEING SO RESTLESS THAT IT IS HARD TO SIT STILL: NOT AT ALL

## 2021-05-27 ASSESSMENT — MIFFLIN-ST. JEOR: SCORE: 1072.76

## 2021-05-27 ASSESSMENT — PATIENT HEALTH QUESTIONNAIRE - PHQ9
5. POOR APPETITE OR OVEREATING: NOT AT ALL
SUM OF ALL RESPONSES TO PHQ QUESTIONS 1-9: 0

## 2021-05-28 ENCOUNTER — TELEPHONE (OUTPATIENT)
Dept: SURGERY | Facility: CLINIC | Age: 61
End: 2021-05-28

## 2021-05-28 ASSESSMENT — ANXIETY QUESTIONNAIRES: GAD7 TOTAL SCORE: 1

## 2021-05-28 NOTE — TELEPHONE ENCOUNTER
[facility]  Post op call note - No Answer    Kimi Ulloa was called for an update regarding her recovery.  There was no answer and a message was left instructing the patient to call the office with any questions or concerns.     Fernanda García PA-C

## 2021-06-02 LAB
COPATH REPORT: NORMAL
PAP: NORMAL

## 2021-06-04 LAB
FINAL DIAGNOSIS: NORMAL
HPV HR 12 DNA CVX QL NAA+PROBE: NEGATIVE
HPV16 DNA SPEC QL NAA+PROBE: NEGATIVE
HPV18 DNA SPEC QL NAA+PROBE: NEGATIVE
SPECIMEN DESCRIPTION: NORMAL
SPECIMEN SOURCE CVX/VAG CYTO: NORMAL

## 2021-10-24 ENCOUNTER — HEALTH MAINTENANCE LETTER (OUTPATIENT)
Age: 61
End: 2021-10-24

## 2022-06-17 DIAGNOSIS — B02.9 HERPES ZOSTER WITHOUT COMPLICATION: ICD-10-CM

## 2022-06-17 RX ORDER — VALACYCLOVIR HYDROCHLORIDE 1 G/1
TABLET, FILM COATED ORAL
Qty: 90 TABLET | Refills: 3 | OUTPATIENT
Start: 2022-06-17

## 2022-06-17 NOTE — TELEPHONE ENCOUNTER
"Requested Prescriptions   Pending Prescriptions Disp Refills     valACYclovir (VALTREX) 1000 mg tablet [Pharmacy Med Name: VALACYCLOVIR 1GM TABLETS] 90 tablet 3     Sig: TAKE 1 TABLET BY MOUTH DAILY AS NEEDED. FOR COLD SORES. TAKE FOR 2 WEEKS AS NEEDED       Antivirals for Herpes Protocol Failed - 6/17/2022  3:14 AM        Failed - Recent (12 mo) or future (30 days) visit within the authorizing provider's specialty     Patient has had an office visit with the authorizing provider or a provider within the authorizing providers department within the previous 12 mos or has a future within next 30 days. See \"Patient Info\" tab in inbasket, or \"Choose Columns\" in Meds & Orders section of the refill encounter.              Failed - Normal serum creatinine on file in past 12 months     Recent Labs   Lab Test 11/25/20  1654   CR 0.64       Ok to refill medication if creatinine is low          Passed - Patient is age 12 or older        Passed - Medication is active on med list           Last Written Prescription Date:  5/27/21  Last Fill Quantity: 90,  # refills: 3   Last office visit: 5/27/2021 with prescribing provider:  Angelika   Future Office Visit:  NONE    Routing to triage to address during business hours.  Jaimee Barros RN on 6/17/2022 at 5:34 AM    Refill denied  Appointment needed for further refills   Emi Broussard RN on 6/17/2022 at 8:28 AM      "

## 2022-07-31 ENCOUNTER — HEALTH MAINTENANCE LETTER (OUTPATIENT)
Age: 62
End: 2022-07-31

## 2022-10-15 ENCOUNTER — HEALTH MAINTENANCE LETTER (OUTPATIENT)
Age: 62
End: 2022-10-15

## 2023-01-02 ENCOUNTER — NURSE TRIAGE (OUTPATIENT)
Dept: NURSING | Facility: CLINIC | Age: 63
End: 2023-01-02

## 2023-01-02 NOTE — TELEPHONE ENCOUNTER
Writer called patient and reviewed PCP's instructions above. Patient expressed verbal understanding and is appreciative of callback.    No further questions/concerns at this time.    Signing encounter.    Joel Kimball RN  [facility]

## 2023-01-02 NOTE — TELEPHONE ENCOUNTER
"    Nurse Triage SBAR    Is this a 2nd Level Triage? YES, LICENSED PRACTITIONER REVIEW IS REQUIREDNo    Situation:   The patient is calling and is inquiring about the Shingrix vaccine for shingles, and wants to know if it is recommended for her to take    Background:   The patient has had two shingles vaccines previously    The patient says when she \"gets stressed out she has shingles outbreaks\"    Assessment:   Needs advise from pcp regarding obtaining the Shingrix vaccine    Protocol Recommended Disposition:   No disposition on file.    Recommendation:   Wait for providers response    Routed to provider    Does the patient meet one of the following criteria for ADS visit consideration? 16+ years old, with an [facility] PCP     TIP  Providers, please consider if this condition is appropriate for management at one of our Acute and Diagnostic Services sites.     If patient is a good candidate, please use dotphrase <dot>triageresponse and select Refer to ADS to document.  "

## 2023-05-23 ENCOUNTER — PATIENT OUTREACH (OUTPATIENT)
Dept: CARE COORDINATION | Facility: CLINIC | Age: 63
End: 2023-05-23
Payer: COMMERCIAL

## 2023-08-20 ENCOUNTER — HEALTH MAINTENANCE LETTER (OUTPATIENT)
Age: 63
End: 2023-08-20

## 2024-01-25 DIAGNOSIS — B02.9 HERPES ZOSTER WITHOUT COMPLICATION: ICD-10-CM

## 2024-01-26 RX ORDER — VALACYCLOVIR HYDROCHLORIDE 1 G/1
TABLET, FILM COATED ORAL
Qty: 30 TABLET | Refills: 3 | Status: SHIPPED | OUTPATIENT
Start: 2024-01-26

## 2024-04-05 ENCOUNTER — OFFICE VISIT (OUTPATIENT)
Dept: FAMILY MEDICINE | Facility: CLINIC | Age: 64
End: 2024-04-05
Payer: COMMERCIAL

## 2024-04-05 VITALS
BODY MASS INDEX: 18.74 KG/M2 | TEMPERATURE: 95.5 F | WEIGHT: 112.5 LBS | HEIGHT: 65 IN | HEART RATE: 54 BPM | OXYGEN SATURATION: 96 % | RESPIRATION RATE: 16 BRPM | DIASTOLIC BLOOD PRESSURE: 84 MMHG | SYSTOLIC BLOOD PRESSURE: 150 MMHG

## 2024-04-05 DIAGNOSIS — Z13.220 LIPID SCREENING: ICD-10-CM

## 2024-04-05 DIAGNOSIS — H26.9 CATARACT OF BOTH EYES, UNSPECIFIED CATARACT TYPE: ICD-10-CM

## 2024-04-05 DIAGNOSIS — R03.0 ELEVATED BLOOD PRESSURE READING: ICD-10-CM

## 2024-04-05 DIAGNOSIS — Z01.818 PRE-OPERATIVE GENERAL PHYSICAL EXAMINATION: Primary | ICD-10-CM

## 2024-04-05 DIAGNOSIS — E78.5 DYSLIPIDEMIA: ICD-10-CM

## 2024-04-05 DIAGNOSIS — R94.31 ABNORMAL ELECTROCARDIOGRAM: ICD-10-CM

## 2024-04-05 LAB
ALBUMIN SERPL BCG-MCNC: 4.7 G/DL (ref 3.5–5.2)
ALP SERPL-CCNC: 109 U/L (ref 40–150)
ALT SERPL W P-5'-P-CCNC: 35 U/L (ref 0–50)
ANION GAP SERPL CALCULATED.3IONS-SCNC: 10 MMOL/L (ref 7–15)
AST SERPL W P-5'-P-CCNC: 51 U/L (ref 0–45)
BILIRUB SERPL-MCNC: 0.5 MG/DL
BUN SERPL-MCNC: 16.8 MG/DL (ref 8–23)
CALCIUM SERPL-MCNC: 9.6 MG/DL (ref 8.8–10.2)
CHLORIDE SERPL-SCNC: 105 MMOL/L (ref 98–107)
CHOLEST SERPL-MCNC: 289 MG/DL
CREAT SERPL-MCNC: 0.66 MG/DL (ref 0.51–0.95)
DEPRECATED HCO3 PLAS-SCNC: 24 MMOL/L (ref 22–29)
EGFRCR SERPLBLD CKD-EPI 2021: >90 ML/MIN/1.73M2
ERYTHROCYTE [DISTWIDTH] IN BLOOD BY AUTOMATED COUNT: 12.1 % (ref 10–15)
FASTING STATUS PATIENT QL REPORTED: YES
GLUCOSE SERPL-MCNC: 93 MG/DL (ref 70–99)
HCT VFR BLD AUTO: 45 % (ref 35–47)
HDLC SERPL-MCNC: 100 MG/DL
HGB BLD-MCNC: 14.8 G/DL (ref 11.7–15.7)
LDLC SERPL CALC-MCNC: 172 MG/DL
MCH RBC QN AUTO: 32.7 PG (ref 26.5–33)
MCHC RBC AUTO-ENTMCNC: 32.9 G/DL (ref 31.5–36.5)
MCV RBC AUTO: 100 FL (ref 78–100)
NONHDLC SERPL-MCNC: 189 MG/DL
PLATELET # BLD AUTO: 284 10E3/UL (ref 150–450)
POTASSIUM SERPL-SCNC: 4.6 MMOL/L (ref 3.4–5.3)
PROT SERPL-MCNC: 7.5 G/DL (ref 6.4–8.3)
RBC # BLD AUTO: 4.52 10E6/UL (ref 3.8–5.2)
SODIUM SERPL-SCNC: 139 MMOL/L (ref 135–145)
TRIGL SERPL-MCNC: 83 MG/DL
WBC # BLD AUTO: 5.9 10E3/UL (ref 4–11)

## 2024-04-05 PROCEDURE — 99214 OFFICE O/P EST MOD 30 MIN: CPT | Performed by: INTERNAL MEDICINE

## 2024-04-05 PROCEDURE — 80053 COMPREHEN METABOLIC PANEL: CPT | Performed by: INTERNAL MEDICINE

## 2024-04-05 PROCEDURE — 93000 ELECTROCARDIOGRAM COMPLETE: CPT | Performed by: INTERNAL MEDICINE

## 2024-04-05 PROCEDURE — 80061 LIPID PANEL: CPT | Performed by: INTERNAL MEDICINE

## 2024-04-05 PROCEDURE — 85027 COMPLETE CBC AUTOMATED: CPT | Performed by: INTERNAL MEDICINE

## 2024-04-05 PROCEDURE — 36415 COLL VENOUS BLD VENIPUNCTURE: CPT | Performed by: INTERNAL MEDICINE

## 2024-04-05 ASSESSMENT — PAIN SCALES - GENERAL: PAINLEVEL: NO PAIN (0)

## 2024-04-05 NOTE — PROGRESS NOTES
Preoperative Evaluation  [address], [address]  [facility] [address]  Phone: [phone]  Primary Provider: Patricia Vargas  Pre-op Performing Provider: ROYCE ACOSTA  Apr 5, 2024       Kimi is a 63 year old, presenting for the following:  Pre-Op Exam, Refill Request (AMBIEN orders for future refills for flights ), and Referral (Arthritis )        4/5/2024     7:57 AM   Additional Questions   Roomed by Gian   Accompanied by Not applicable, by themselves     Surgical Information  Surgery/Procedure: CATARACTS  Surgery Location: [facility] [place]  Surgeon: Dr Muse  Surgery Date: 4/11/2024 (L)& 4/30/2024 (R)  Time of Surgery: tbd  Where patient plans to recover: At home with family  Fax number for surgical facility: [phone]    Assessment & Plan     The proposed surgical procedure is considered LOW risk.    Problem List Items Addressed This Visit    None  Visit Diagnoses       Pre-operative general physical examination    -  Primary    Relevant Orders    CBC with platelets (Completed)    Comprehensive metabolic panel (BMP + Alb, Alk Phos, ALT, AST, Total. Bili, TP) (Completed)    EKG 12-lead complete w/read - Clinics (Completed)    Cataract of both eyes, unspecified cataract type        Elevated blood pressure reading        Relevant Orders    EKG 12-lead complete w/read - Clinics (Completed)    Lipid screening        Dyslipidemia        Relevant Orders    Lipid panel reflex to direct LDL Fasting (Completed)    Abnormal electrocardiogram        Relevant Orders    EKG 12-lead complete w/read - Clinics (Completed)             - No identified additional risk factors other than previously addressed    Antiplatelet or Anticoagulation Medication Instructions   - Patient is on no antiplatelet or anticoagulation medications.    Additional Medication Instructions   - ibuprofen (Advil, Motrin): HOLD 1 day before surgery.      Recommendation  APPROVAL GIVEN to proceed with proposed procedure, without further diagnostic evaluation.  Blood pressure elevated possible whitecoat syndrome advised patient to check blood pressure at home and call us with reading , blood pressure 134 systolic repeat was 150.  Will check labs lipid panel.  Chemistry and CBC.  Patient will be cleared for surgery low risk surgery has no known significant cardiovascular risks.  Addendum EKG showed normal sinus rhythm short ID interval  Advised patient to schedule physical with her PCP.    Addendum EKG finding: Nonspecific T wave changes/poor R wave progression V1 to V3, consulted was Dr. Son from electrophysiology who recommended since this is a low risk surgery she can proceed and cleared for surgery.  Dr. Son as well as this provider recommended further cardiac workup, advised to patient do echo to check LV wall motion, patient was advised to monitor her blood pressure and call us with blood pressure readings.  Blood pressure was elevated in the clinic.  Subjective       HPI related to upcoming procedure: CATARACT SURGERY    Patient presented for preop clearance for cataract surgery.  She is physically very active she went downhill skiing recently by helicopter.  She denies any chest pain palpitations presyncope or syncope.  No history of VTE or bleeding disorder.  No history of complication from anesthesia.  Had history of to knee replacement/hip replacement.  No history of sleep apnea.  She takes ibuprofen occasionally.  Currently denies any cough runny nose GERD symptoms.  No dental issues or neck pain issues.  No other systemic complaints.          3/29/2024     3:41 PM   Preop Questions   1. Have you ever had a heart attack or stroke? No   2. Have you ever had surgery on your heart or blood vessels, such as a stent placement, a coronary artery bypass, or surgery on an artery in your head, neck, heart, or legs? No   3. Do you have chest pain with  activity? No   4. Do you have a history of  heart failure? No   5. Do you currently have a cold, bronchitis or symptoms of other infection? No   6. Do you have a cough, shortness of breath, or wheezing? No   7. Do you or anyone in your family have previous history of blood clots? No   8. Do you or does anyone in your family have a serious bleeding problem such as prolonged bleeding following surgeries or cuts? No   9. Have you ever had problems with anemia or been told to take iron pills? No   10. Have you had any abnormal blood loss such as black, tarry or bloody stools, or abnormal vaginal bleeding? No   11. Have you ever had a blood transfusion? No   12. Are you willing to have a blood transfusion if it is medically needed before, during, or after your surgery? Yes   13. Have you or any of your relatives ever had problems with anesthesia? No   14. Do you have sleep apnea, excessive snoring or daytime drowsiness? No   15. Do you have any artifical heart valves or other implanted medical devices like a pacemaker, defibrillator, or continuous glucose monitor? No   16. Do you have artificial joints? YES - 2 KNEES AND HIP (L)   17. Are you allergic to latex? No       Health Care Directive  Patient does not have a Health Care Directive or Living Will: Discussed advance care planning with patient; information given to patient to review.    Preoperative Review of    reviewed - no record of controlled substances prescribed.      Status of Chronic Conditions:  See problem list for active medical problems.  Problems all longstanding and stable, except as noted/documented.  See ROS for pertinent symptoms related to these conditions.    Patient Active Problem List    Diagnosis Date Noted    Fx ankle 05/26/2021     Priority: Medium     Formatting of this note might be different from the original.  Rt.      Fx wrist 05/26/2021     Priority: Medium     Formatting of this note might be different from the  original.  Lf    Formatting of this note might be different from the original.  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 Provider to review and confirm.)      Aftercare following joint replacement 05/10/2010     Priority: Medium    Edema 05/10/2010     Priority: Medium      Past Medical History:   Diagnosis Date    Arthritis     H/O cold sores      Past Surgical History:   Procedure Laterality Date    APPLY WOUND VAC Right 3/22/2018    Procedure: APPLY WOUND VAC;;  Surgeon: Paresh Oliveira MD;  Location:  OR    ARTHROPLASTY KNEE Left 2010    ARTHROPLASTY KNEE Right 2/26/2018    Procedure: ARTHROPLASTY KNEE;  RIGHT TOTAL KNEE ARTHROPLASTY  ;  Surgeon: Paresh Oliveira MD;  Location:  OR    ARTHROPLASTY REVISION KNEE  7/19/2014    Procedure: ARTHROPLASTY REVISION KNEE;  Surgeon: Paresh Oliveira MD;  Location:  OR    HERNIORRHAPHY EPIGASTRIC N/A 5/12/2021    Procedure: OPEN EPIGASTRIC HERNIA REPAIR;  Surgeon: Italo Cunningham MD;  Location:  OR    IRRIGATION AND DEBRIDEMENT KNEE, COMBINED Right 3/22/2018    Procedure: COMBINED IRRIGATION AND DEBRIDEMENT KNEE;  RIGHT KNEE IRRIGATION AND DEBRIDEMENT WOUND VAC APPLICATION, ROTATIONAL MEDIAL GASTROC FLAP, SPLIT THICKNESS GRAFT, REPAIR RETINACULUM, DRAIN PLACEMENT;  Surgeon: Roxanne  Paresh MI MD;  Location:  OR    IRRIGATION AND DEBRIDEMENT KNEE, PLACE ANTIBIOTIC CEMENT BEADS / SPACE  7/19/2014    Procedure: COMBINED IRRIGATION AND DEBRIDEMENT KNEE, PLACE ANTIBIOTIC CEMENT BEADS / SPACER;  Surgeon: Paresh Oliveira MD;  Location:  OR    ORTHOPEDIC SURGERY Left     2016  left hip surgery    ORTHOPEDIC SURGERY      shoulder     Current Outpatient Medications   Medication Sig Dispense Refill    IBUPROFEN PO Take 800 mg by mouth daily as needed for moderate pain       multivitamin, therapeutic (THERA-VIT) TABS Take 1 tablet by mouth daily      atorvastatin (LIPITOR) 20 MG tablet Take 1 tablet (20 mg) by mouth daily 90 tablet 0    EPINEPHrine (EPIPEN/ADRENACLICK/OR ANY BX GENERIC EQUIV) 0.3 MG/0.3ML injection 2-pack Inject 0.3 mLs (0.3 mg) into the muscle once as needed for anaphylaxis (Patient not taking: Reported on 4/5/2024) 1.2 mL 11    valACYclovir (VALTREX) 1000 mg tablet TAKE 1 TABLET BY MOUTH DAILY AS NEEDED FOR COLD SORES. TAKE FOR 2 WEEKS AS NEEDED (Patient not taking: Reported on 4/5/2024) 30 tablet 3    zolpidem (AMBIEN) 5 MG tablet Take 1 tablet (5 mg) by mouth nightly as needed for sleep (Patient not taking: Reported on 4/5/2024) 10 tablet 0       Allergies   Allergen Reactions    Bees Anaphylaxis    Morphine Nausea and Vomiting    Vancomycin      Other reaction(s): Sandie Syndrome    Vancomycin Rash        Social History     Tobacco Use    Smoking status: Never    Smokeless tobacco: Never   Substance Use Topics    Alcohol use: Yes     Family History   Problem Relation Age of Onset    Gallbladder Disease Mother     Other Cancer Mother         Gallbladder Cancer    Arthritis Mother     Hypertension Father     Hyperlipidemia Father     Heart Disease Paternal Grandmother     Rheumatoid Arthritis Paternal Grandmother     No Known Problems Sister     No Known Problems Brother     No Known Problems Maternal Grandmother     No Known Problems Maternal Grandfather     No Known Problems  "Other     Rheumatoid Arthritis Paternal Aunt      History   Drug Use No         Review of Systems    Review of Systems  Constitutional, HEENT, cardiovascular, pulmonary, gi and gu systems are negative, except as otherwise noted.    Objective    BP (!) 150/84   Pulse 54   Temp (!) 95.5  F (35.3  C) (Temporal)   Resp 16   Ht 1.638 m (5' 4.5\")   Wt 51 kg (112 lb 8 oz)   SpO2 96%   BMI 19.01 kg/m     Estimated body mass index is 19.01 kg/m  as calculated from the following:    Height as of this encounter: 1.638 m (5' 4.5\").    Weight as of this encounter: 51 kg (112 lb 8 oz).  Physical Exam  GENERAL: alert and no distress  EYES: Eyes grossly normal to inspection, PERRL and conjunctivae and sclerae normal  HENT: ear canals and TM's normal, nose and mouth without ulcers or lesions  NECK: no adenopathy, no asymmetry, masses, or scars  RESP: lungs clear to auscultation - no rales, rhonchi or wheezes  CV: regular rate and rhythm, normal S1 S2, no S3 or S4, no murmur, click or rub, no peripheral edema  ABDOMEN: soft, nontender, no hepatosplenomegaly, no masses and bowel sounds normal  MS: no gross musculoskeletal defects noted, no edema  SKIN: no suspicious lesions or rashes  NEURO: Normal strength and tone, mentation intact and speech normal  PSYCH: mentation appears normal, affect normal/bright    No results for input(s): \"HGB\", \"PLT\", \"INR\", \"NA\", \"POTASSIUM\", \"CR\", \"A1C\" in the last 03241 hours.     Diagnostics  No labs were ordered during this visit.   No EKG required for low risk surgery (cataract, skin procedure, breast biopsy, etc).    Revised Cardiac Risk Index (RCRI)  The patient has the following serious cardiovascular risks for perioperative complications:   - No serious cardiac risks = 0 points     RCRI Interpretation: 0 points: Class I (very low risk - 0.4% complication rate)         Signed Electronically by: Marge Patel MD  Copy of this evaluation report is provided to requesting physician.         "

## 2024-04-06 ENCOUNTER — DOCUMENTATION ONLY (OUTPATIENT)
Dept: FAMILY MEDICINE | Facility: CLINIC | Age: 64
End: 2024-04-06
Payer: COMMERCIAL

## 2024-04-06 NOTE — RESULT ENCOUNTER NOTE
En Bolden-I reviewed your EKG again as mentioned there is normal sinus rhythm there are some abnormal findings on the EKG with short AK interval and anterior T wave inversion suggestive of anterior ischemia,; could be finding related to untreated high blood pressure; as discussed we need to closely monitor blood pressures as outpatient and preferably do the 24-hour blood pressure monitor.  I did send your EKG to one of our electrophysiologist cardiology specialist and I look forward to hear from the specialist, but I Am recommending that you follow-up with me or your PCP  after surgery, also recommending further evaluation including echocardiogram plus or minus stress echo.  I will wait to hear from the cardiology again.  Reassuring you are very physically active and you do not have any symptoms during exertion.    If you would like to discuss further your EKG findings, please do not hesitate to schedule telephone visit with me.  Regards,  Dr Patel;

## 2024-04-06 NOTE — RESULT ENCOUNTER NOTE
En Bolden-I reviewed your cholesterol panel.    LDL the bad cholesterol is 172 slightly elevated, LDL goal less than 100, HDL which is the good cholesterol is normal at 100    The HDL is elevated, HDL can be considered atheroprotective to the heart,    Triglyceride is normal.  Please continue with healthy diet and lifestyle changes; low saturated fat low cholesterol diet.  Calculated your 10-year atherosclerotic cardiovascular disease risk or as below is 7.1%, slightly elevated, you may consider starting a cholesterol medication called atorvastatin which I recommend low-dose 20 mg 1 tablet once daily at bedtime.  If you agree can send a prescription for low-dose atorvastatin to the pharmacy on record.      Goal for 10-year atherosclerotic cardiovascular disease risk is less than 5%.      The 10-year ASCVD risk score (Charles SMITH, et al., 2019) is: 7.1%    Values used to calculate the score:      Age: 63 years      Sex: Female      Is Non- : No      Diabetic: No      Tobacco smoker: No      Systolic Blood Pressure: 150 mmHg      Is BP treated: No      HDL Cholesterol: 100 mg/dL      Total Cholesterol: 289 mg/dL      Chemistry shows normal electrolytes, normal kidney function test reassuring, normal calcium level, slightly elevated liver enzyme called AST 51 up from 25, ALT another liver test is normal, will continue to monitor liver enzymes, please keep well-hydrated and avoid excess Tylenol intake and/or alcohol intake as both can be toxic to the liver.    Will need to repeat your liver enzymes next 2 to 3 months if persistent elevation will order ultrasound of the liver.  Protein/albumin is normal,    I did discuss with the cardiologist about your EKG finding, again since this is a low risk surgery the EKG findings would not affect your surgery, this is a low risk surgery and you can proceed with surgery.  But cardiology/electrophysiology specialist, does agree that you may benefit from doing  "an echocardiogram to check the function of the heart.    This is the heart/cardiology recommendation below:  \"As you said, cataract surgery is so low risk you can pretty much always approve (from a cardiac standpoint) unless they are having unstable angina or a STEMI...    Short [place] is never an issue.  For the T-abnormality you can later evaluate with an echo-, probably a good idea to do at some point but not necessary before cataract surgery.\"      Any further questions please let me know,  Regards,  Dr Patel    *We hope you had an excellent experience during your clinic visit. You may receive an e-mail from [employer] within the next few days inviting you to participate in an online patient experience survey. I value your experience and would be very thankful for your time with providing feedback on our clinic survey.      "

## 2024-04-06 NOTE — PROGRESS NOTES
"Message  Received: Today  Sundeep Son MD Sayegh, Kamil Nadim, MD Hi Kamil,    As you said, cataract surgery is so low risk you can pretty much always approve (from a cardiac standpoint) unless they are having unstable angina or a STEMI...   :)    Short [place] is never an issue.  For the T-abnormality you can later evaluate with an echo-, probably a good idea to do at some point but not necessary before cataract surgery.    Jerrell          Previous Messages       ----- Message -----  From: Marge Patel MD  Sent: 4/5/2024   7:03 PM CDT  To: Sundeep Son MD; *    Hi Dr. Son, can take your opinion on this preop clearance for cataract surgery,  63-year-old seen first time, her blood pressure elevated in the clinic in the 150s systolic over mid 80s.?  Whitecoat syndrome, versus untreated hypertension, not on any antihypertensive treatment prior, she is very physically active without any chest symptoms     EKG shows the following:    Sinus Rhythm -Short OK syndrome  Delilah = 92  -Negative T-waves -Possible Anterior  ischemia.      Should I be concerned about this possible anterior ischemia and \"short OK \" syndrome,, do you recommend that she undergoes echocardiogram plus or minus stress echo.  She is undergoing low risk surgery and her METS is above 10, so I would definitely clear her for surgery.    Appreciate your feedback,  Marge"

## 2024-04-08 DIAGNOSIS — E78.5 HYPERLIPIDEMIA LDL GOAL <100: Primary | ICD-10-CM

## 2024-04-08 RX ORDER — ATORVASTATIN CALCIUM 20 MG/1
20 TABLET, FILM COATED ORAL DAILY
Qty: 90 TABLET | Refills: 0 | Status: SHIPPED | OUTPATIENT
Start: 2024-04-08 | End: 2024-05-30

## 2024-04-17 NOTE — DISCHARGE SUMMARY
[facility] Discharge Summary    Kimi Ulloa MRN# [id number]   Age: 57 year old YOB: 1960     Date of Admission:  2/26/2018  Date of Discharge::  3/1/2018  6:05 PM  Admitting Physician:  Paresh Oliveira MD  Discharge Physician:  Paresh Oliveira MD     Home clinic: [facility]          Admission Diagnoses:   END STAGE OSTEOARTHRITIS RIGHT KNEE  Total knee replacement status, right          Discharge Diagnosis:   END STAGE OSTEOARTHRITIS RIGHT KNEE  Total knee replacement status, right          Procedures:   Procedure(s): Total knee arthoplasty (Right)       No other significant procedures performed during this admission           Medications Prior to Admission:     No prescriptions prior to admission.             Discharge Medications:     Discharge Medication List as of 3/1/2018  5:45 PM      START taking these medications    Details   oxyCODONE (OXYCONTIN) 10 MG 12 hr tablet Take 1 tablet (10 mg) by mouth every 12 hours as needed for moderate to severe pain, Disp-14 tablet, R-0, Local Print      cephALEXin (KEFLEX) 500 MG capsule Take 1 capsule (500 mg) by mouth 3 times daily for 5 days, Disp-15 capsule, R-0, E-Prescribe      HYDROmorphone (DILAUDID) 2 MG tablet Take 1-2 tablets (2-4 mg) by mouth every 3 hours as needed for moderate to severe pain, Disp-70 tablet, R-0, Local Print      aspirin  MG EC tablet Take 1 tablet (325 mg) by mouth 2 times daily (before meals), Disp-60 tablet, R-0, E-Prescribe      ondansetron (ZOFRAN-ODT) 4 MG ODT tab Take 1 tablet (4 mg) by mouth every 6 hours as needed (Nausea and Vomiting), Disp-10 tablet, R-0, E-Prescribe      senna-docusate (SENOKOT-S;PERICOLACE) 8.6-50 MG per tablet Take 1 tablet by mouth 2 times daily as needed for constipation, Disp-40 tablet, R-0, E-Prescribe      cyclobenzaprine (FLEXERIL) 10 MG tablet Take 1 tablet (10 mg) by mouth 3 times daily as needed for muscle spasms, Disp-30 tablet, R-0, E-Prescribe      order  Lower her Synthroid to 75 mcg daily, check a TSH prior to next visit   for DME Equipment being ordered: CPM: Advance as tolerated. Range 0-90 degree flexion  Treatment Diagnosis  Total kneeDisp-1 Device, R-0, Local Print         CONTINUE these medications which have NOT CHANGED    Details   Acetaminophen (TYLENOL PO) Take 650 mg by mouth every 6 hours as needed for mild pain or fever, Historical      AMOXICILLIN PO Take 2,000 mg by mouth daily as needed (patient takes 4 X 500 mg = 2,000 mg dose) 1 hour prior to dental appt., Historical      KRILL OIL PO Take 1 capsule by mouth every morning, Historical      EPINEPHrine (EPIPEN/ADRENACLICK/OR ANY BX GENERIC EQUIV) 0.3 MG/0.3ML injection 2-pack Inject 0.3 mLs (0.3 mg) into the muscle once as needed for anaphylaxis, Disp-1.2 mL, R-11, E-Prescribe      multivitamin, therapeutic (THERA-VIT) TABS Take 1 tablet by mouth daily, Historical      IBUPROFEN PO Take 800 mg by mouth daily as needed for moderate pain , Historical                   Consultations:   Consultation during this admission received from nutrition          Brief History of Illness:   This patient was a 57 year old female with a known history of osteoarthritis.  She underwent a period of non-operative treatment which only provided mild and intermittent relief.  After a lengthy discussion and consultation with Dr. vega, the patient chose to undergo a right side knee arthroplasty.  She was explained the risks,complications, and benefits of the procedure and elected to have the surgical procedure.  Patient was cleared by her primary care physician for joint replacement.           Hospital Course:   The patient tolerated the procedure well and was taken to postop recovery in stable condition.  Please refer to the full operative note for complete details.   In recovery, she had a post-operative hemoglobin of satble and was noted to be motor and neurovascular intact.  Post-operative films show components in excellent position.     On post-operative day 1, patient's wound was  checked and noted to be healing well..  The drain output was within normal limits..  Patient had adequate pain control and was prescribed physical therapy.  She was given 24 hrs of perioperative antibiotics.  Patient had motor strength of 5/5 on the both sides.  Patient was neurovascularly intact in the both sides lower extremity. There were no complications throughout the hospital course as the patient passed physical therapy/occupational therapy on post-operative day #0.  The drain was pulled on post-operative day #1.  Her discharge hemoglobin was 1 and the patient did not require a blood transfusion.  She was followed by the hospitalist during this hospital visit to manage her medical problems.     Upon discharge, the patient was seen by Dr. vega and all questions were answered.  She was discharged on home medications as outlined in medication reconciliation list outlined below.  The patient has instructions that if she has increased pain, fever, erythema, swelling or drainage to immediately call.          Discharge Instructions and Follow-Up:   Discharge diet: Regular   Discharge activity: Activity as tolerated   Discharge follow-up: Follow up with me in 2 weeks   Wound care: Apply bandage daily  Keep wound clean and dry  May get incision wet in shower but do not soak or scrub    Nutrition  Saw  Pt  For  rec  For  Diet improvement   Has  Significant  riskds  For healing  Due  To  No significant  Sub  q  Tissues  And  Significant protein  deficiency           Discharge Disposition:   Discharged to home      Attestation:  I have reviewed today's vital signs, notes, medications, labs and imaging.    Paresh Vega MD

## 2024-05-30 DIAGNOSIS — E78.5 HYPERLIPIDEMIA LDL GOAL <100: ICD-10-CM

## 2024-05-30 RX ORDER — ATORVASTATIN CALCIUM 20 MG/1
20 TABLET, FILM COATED ORAL DAILY
Qty: 90 TABLET | Refills: 2 | Status: SHIPPED | OUTPATIENT
Start: 2024-05-30

## 2024-10-13 ENCOUNTER — HEALTH MAINTENANCE LETTER (OUTPATIENT)
Age: 64
End: 2024-10-13

## 2024-12-01 DIAGNOSIS — B02.9 HERPES ZOSTER WITHOUT COMPLICATION: ICD-10-CM

## 2024-12-02 RX ORDER — VALACYCLOVIR HYDROCHLORIDE 1 G/1
TABLET, FILM COATED ORAL
Qty: 30 TABLET | Refills: 3 | Status: SHIPPED | OUTPATIENT
Start: 2024-12-02

## 2024-12-30 DIAGNOSIS — E78.5 HYPERLIPIDEMIA LDL GOAL <100: ICD-10-CM

## 2024-12-30 RX ORDER — ATORVASTATIN CALCIUM 20 MG/1
20 TABLET, FILM COATED ORAL DAILY
Qty: 90 TABLET | Refills: 0 | Status: SHIPPED | OUTPATIENT
Start: 2024-12-30

## 2025-04-07 DIAGNOSIS — E78.5 HYPERLIPIDEMIA LDL GOAL <100: ICD-10-CM

## 2025-04-07 RX ORDER — ATORVASTATIN CALCIUM 20 MG/1
20 TABLET, FILM COATED ORAL DAILY
Qty: 90 TABLET | Refills: 0 | Status: SHIPPED | OUTPATIENT
Start: 2025-04-07

## (undated) DEVICE — DRSG ADAPTIC 3X8" 6113

## (undated) DEVICE — GLOVE PROTEXIS POWDER FREE 8.0 ORTHOPEDIC 2D73ET80

## (undated) DEVICE — SYR 01ML 27GA 0.5" NDL TBC 309623

## (undated) DEVICE — DRAPE CONVERTORS U-DRAPE 60X72" 8476

## (undated) DEVICE — SU SILK 2-0 FS-1 18" 685G

## (undated) DEVICE — SU PDS II 2-0 CT-1 27" Z339H

## (undated) DEVICE — DRSG WOUND VAC GRANUFOAM MED SILVER M8275096/5

## (undated) DEVICE — BLADE SAW RECIP STRK 70X12.5X1.2MM 0277-096-281

## (undated) DEVICE — SOL NACL 0.9% IRRIG 1000ML BOTTLE 07138-09

## (undated) DEVICE — GLOVE PROTEXIS BLUE W/NEU-THERA 8.5  2D73EB85

## (undated) DEVICE — STPL SKIN 35W APPOSE 8886803712

## (undated) DEVICE — LINEN TOWEL PACK X5 5464

## (undated) DEVICE — PACK TOTAL KNEE SOP15TKFSD

## (undated) DEVICE — DECANTER VIAL 2006S

## (undated) DEVICE — NDL 22GA 1.5"

## (undated) DEVICE — SU VICRYL 1 CTX CR 8X18" J765D

## (undated) DEVICE — SU VICRYL 0 CT-2 27" J334H

## (undated) DEVICE — SYR 10ML LL W/O NDL 302995

## (undated) DEVICE — SUCTION IRR SYSTEM W/O TIP INTERPULSE HANDPIECE 0210-100-000

## (undated) DEVICE — SU VICRYL 4-0 PS-2 18" UND J496H

## (undated) DEVICE — GLOVE PROTEXIS W/NEU-THERA 8.0  2D73TE80

## (undated) DEVICE — NDL 18GA 1.5" 305196

## (undated) DEVICE — ESU GROUND PAD UNIVERSAL W/O CORD

## (undated) DEVICE — CATH FOLEY 16FR 5ML SILVER COAT LATEX 0165SI16

## (undated) DEVICE — PREP CHLORAPREP 26ML TINTED ORANGE  260815

## (undated) DEVICE — PACK MINOR SBA15MIFSE

## (undated) DEVICE — SOL WATER IRRIG 1000ML BOTTLE 2F7114

## (undated) DEVICE — SU PROLENE 3-0 SHDA 36" 8522H

## (undated) DEVICE — BLADE DERMATOME ZIMMER  00-8800-000-10

## (undated) DEVICE — DRSG TEGADERM 8X12" 1629

## (undated) DEVICE — DRAPE STERI U 1015

## (undated) DEVICE — DRSG XEROFORM 5X9" 8884431605

## (undated) DEVICE — SYR 10ML FINGER CONTROL W/O NDL 309695

## (undated) DEVICE — GLOVE PROTEXIS W/NEU-THERA 7.5  2D73TE75

## (undated) DEVICE — SU VICRYL 3-0 TIE 12X18" J904T

## (undated) DEVICE — GLOVE PROTEXIS W/NEU-THERA 8.5  2D73TE85

## (undated) DEVICE — IMM KNEE 20" 0814-2660

## (undated) DEVICE — SU PDS II 2-0 CT-2 27"  Z333H

## (undated) DEVICE — SU VICRYL 2-0 CP-1 27" UND J266H

## (undated) DEVICE — DERMACARRIER 1.5:1 ZIMMER 00-2195-012-00

## (undated) DEVICE — NDL 25GA 1.5" 305127

## (undated) DEVICE — ESU ELEC BLADE 2.75" COATED/INSULATED E1455

## (undated) DEVICE — GLOVE PROTEXIS POWDER FREE 8.5 ORTHOPEDIC 2D73ET85

## (undated) DEVICE — SYR 30ML LL W/O NDL

## (undated) DEVICE — BLADE SAW SAGITTAL STRK 18X90X1.37MM HD SYS 6 6118-137-090

## (undated) DEVICE — SUCTION TIP YANKAUER STR K87

## (undated) DEVICE — DRAPE LAP W/ARMBOARD 29410

## (undated) DEVICE — ESU PENCIL W/SMOKE EVAC CVPLP2000

## (undated) DEVICE — MANIFOLD NEPTUNE 4 PORT 700-20

## (undated) DEVICE — DRAIN JACKSON PRATT 15FR ROUND SIL LF JP-2229

## (undated) DEVICE — BONE CLEANING TIP INTERPULSE  0210-010-000

## (undated) DEVICE — GLOVE PROTEXIS BLUE W/NEU-THERA 7.5  2D73EB75

## (undated) DEVICE — SU VICRYL 3-0 SH 27" J316H

## (undated) DEVICE — DRSG GAUZE 4X4" 3033

## (undated) DEVICE — CAST PADDING 6" UNSTERILE 9046

## (undated) DEVICE — SU WND CLOSURE VLOC 180 ABS 0 24" GS-25 VLOCL0436

## (undated) DEVICE — SOL BENZOIN 0.5OZ

## (undated) DEVICE — DRSG STERI STRIP 1/2X4" R1547

## (undated) DEVICE — SU ETHIBOND 0 CTX CR  8X18" CX31D

## (undated) DEVICE — DRAPE IOBAN INCISE 23X17" 6650EZ

## (undated) DEVICE — BLADE SAW SAGITTAL STRK 19.5X90X1.20MM 2108-109-000S17

## (undated) DEVICE — DRAIN ROUND W/RESERV KIT JACKSON PRATT 10FR 400ML SU130-402D

## (undated) DEVICE — BONE CEMENT MIXEVAC III HI VAC KIT  0206-015-000

## (undated) DEVICE — SYR EAR BULB 3OZ 0035830

## (undated) DEVICE — KIT SPY ELITE DISP KIT W/DRAPE SGL PACK LC3001

## (undated) DEVICE — NDL 19GA 1.5"

## (undated) DEVICE — SUCTION TIP YANKAUER W/O VENT K86

## (undated) DEVICE — GLOVE PROTEXIS BLUE W/NEU-THERA 8.0  2D73EB80

## (undated) RX ORDER — FENTANYL CITRATE 50 UG/ML
INJECTION, SOLUTION INTRAMUSCULAR; INTRAVENOUS
Status: DISPENSED
Start: 2018-03-22

## (undated) RX ORDER — HYDROMORPHONE HYDROCHLORIDE 1 MG/ML
INJECTION, SOLUTION INTRAMUSCULAR; INTRAVENOUS; SUBCUTANEOUS
Status: DISPENSED
Start: 2021-05-12

## (undated) RX ORDER — FENTANYL CITRATE 50 UG/ML
INJECTION, SOLUTION INTRAMUSCULAR; INTRAVENOUS
Status: DISPENSED
Start: 2021-05-12

## (undated) RX ORDER — GINSENG 100 MG
CAPSULE ORAL
Status: DISPENSED
Start: 2018-03-22

## (undated) RX ORDER — ONDANSETRON 2 MG/ML
INJECTION INTRAMUSCULAR; INTRAVENOUS
Status: DISPENSED
Start: 2018-02-26

## (undated) RX ORDER — HYDROMORPHONE HYDROCHLORIDE 1 MG/ML
INJECTION, SOLUTION INTRAMUSCULAR; INTRAVENOUS; SUBCUTANEOUS
Status: DISPENSED
Start: 2018-03-22

## (undated) RX ORDER — EPINEPHRINE 1 MG/ML
INJECTION, SOLUTION INTRAMUSCULAR; SUBCUTANEOUS
Status: DISPENSED
Start: 2018-03-22

## (undated) RX ORDER — PROPOFOL 10 MG/ML
INJECTION, EMULSION INTRAVENOUS
Status: DISPENSED
Start: 2018-03-22

## (undated) RX ORDER — MINERAL OIL
OIL (ML) MISCELLANEOUS
Status: DISPENSED
Start: 2018-03-22

## (undated) RX ORDER — CEFAZOLIN SODIUM 1 G/3ML
INJECTION, POWDER, FOR SOLUTION INTRAMUSCULAR; INTRAVENOUS
Status: DISPENSED
Start: 2018-03-22

## (undated) RX ORDER — CEFAZOLIN SODIUM 2 G/100ML
INJECTION, SOLUTION INTRAVENOUS
Status: DISPENSED
Start: 2018-02-26

## (undated) RX ORDER — BUPIVACAINE HYDROCHLORIDE 2.5 MG/ML
INJECTION, SOLUTION EPIDURAL; INFILTRATION; INTRACAUDAL
Status: DISPENSED
Start: 2018-02-26

## (undated) RX ORDER — BUPIVACAINE HYDROCHLORIDE 2.5 MG/ML
INJECTION, SOLUTION EPIDURAL; INFILTRATION; INTRACAUDAL
Status: DISPENSED
Start: 2018-03-22

## (undated) RX ORDER — FENTANYL CITRATE 50 UG/ML
INJECTION, SOLUTION INTRAMUSCULAR; INTRAVENOUS
Status: DISPENSED
Start: 2018-02-26

## (undated) RX ORDER — LIDOCAINE HYDROCHLORIDE 20 MG/ML
INJECTION, SOLUTION EPIDURAL; INFILTRATION; INTRACAUDAL; PERINEURAL
Status: DISPENSED
Start: 2018-02-26

## (undated) RX ORDER — DEXAMETHASONE SODIUM PHOSPHATE 4 MG/ML
INJECTION, SOLUTION INTRA-ARTICULAR; INTRALESIONAL; INTRAMUSCULAR; INTRAVENOUS; SOFT TISSUE
Status: DISPENSED
Start: 2018-02-26

## (undated) RX ORDER — KETOROLAC TROMETHAMINE 15 MG/ML
INJECTION, SOLUTION INTRAMUSCULAR; INTRAVENOUS
Status: DISPENSED
Start: 2018-03-22

## (undated) RX ORDER — CEFAZOLIN SODIUM 2 G/100ML
INJECTION, SOLUTION INTRAVENOUS
Status: DISPENSED
Start: 2021-05-12

## (undated) RX ORDER — LIDOCAINE HYDROCHLORIDE 20 MG/ML
INJECTION, SOLUTION EPIDURAL; INFILTRATION; INTRACAUDAL; PERINEURAL
Status: DISPENSED
Start: 2018-03-22

## (undated) RX ORDER — PROPOFOL 10 MG/ML
INJECTION, EMULSION INTRAVENOUS
Status: DISPENSED
Start: 2018-02-26

## (undated) RX ORDER — ACETAMINOPHEN 10 MG/ML
INJECTION, SOLUTION INTRAVENOUS
Status: DISPENSED
Start: 2018-03-22

## (undated) RX ORDER — BUPIVACAINE HYDROCHLORIDE AND EPINEPHRINE 5; 5 MG/ML; UG/ML
INJECTION, SOLUTION EPIDURAL; INTRACAUDAL; PERINEURAL
Status: DISPENSED
Start: 2021-05-12

## (undated) RX ORDER — EPINEPHRINE 1 MG/ML
INJECTION, SOLUTION INTRAMUSCULAR; SUBCUTANEOUS
Status: DISPENSED
Start: 2018-02-26